# Patient Record
Sex: MALE | Race: WHITE | Employment: OTHER | URBAN - METROPOLITAN AREA
[De-identification: names, ages, dates, MRNs, and addresses within clinical notes are randomized per-mention and may not be internally consistent; named-entity substitution may affect disease eponyms.]

---

## 2020-02-24 PROBLEM — I48.91 AFIB (HCC): Status: ACTIVE | Noted: 2020-02-24

## 2020-03-16 ENCOUNTER — TELEPHONE (OUTPATIENT)
Dept: PHARMACY | Age: 80
End: 2020-03-16

## 2020-04-06 ENCOUNTER — TELEPHONE (OUTPATIENT)
Dept: PHARMACY | Age: 80
End: 2020-04-06

## 2020-04-06 NOTE — TELEPHONE ENCOUNTER
I called and left message for patient to call regarding scheduled appointment tomorrow in [facility].

## 2020-04-07 ENCOUNTER — ANTI-COAG VISIT (OUTPATIENT)
Dept: PHARMACY | Age: 80
End: 2020-04-07
Payer: MEDICARE

## 2020-04-07 DIAGNOSIS — I48.0 PAROXYSMAL ATRIAL FIBRILLATION (HCC): ICD-10-CM

## 2020-04-07 LAB
INR BLD: 2.69 (ref 0.86–1.14)
PROTHROMBIN TIME: 31.5 SEC (ref 10–13.2)

## 2020-04-07 PROCEDURE — 99211 OFF/OP EST MAY X REQ PHY/QHP: CPT

## 2020-04-07 RX ORDER — DOFETILIDE 0.5 MG/1
500 CAPSULE ORAL 2 TIMES DAILY
COMMUNITY

## 2020-04-07 RX ORDER — LEVOTHYROXINE SODIUM 0.05 MG/1
50 TABLET ORAL DAILY
COMMUNITY

## 2020-04-07 RX ORDER — WARFARIN SODIUM 4 MG/1
6 TABLET ORAL DAILY
COMMUNITY

## 2020-04-07 RX ORDER — METOPROLOL SUCCINATE 50 MG/1
100 TABLET, EXTENDED RELEASE ORAL NIGHTLY
COMMUNITY
End: 2022-04-20

## 2020-04-07 RX ORDER — ASPIRIN 81 MG/1
81 TABLET ORAL DAILY
COMMUNITY

## 2020-04-07 RX ORDER — FINASTERIDE 5 MG/1
5 TABLET, FILM COATED ORAL DAILY
COMMUNITY

## 2020-04-07 RX ORDER — TERAZOSIN 10 MG/1
10 CAPSULE ORAL NIGHTLY
COMMUNITY

## 2020-04-07 RX ORDER — LEVETIRACETAM 500 MG/1
500 TABLET ORAL 2 TIMES DAILY
COMMUNITY

## 2020-04-07 NOTE — PROGRESS NOTES
Mr. Mekhi Mcguire is a 78 y.o.  male with history of Afib who presents today for anticoagulation monitoring and adjustment. Patient verifies current dosing regimen  Patient denies s/s bleeding/bruising/swelling/SOB  No blood in urine or stool. No dietary changes. No changes in medication/OTC agents/Herbals. No change in alcohol use. No missed doses. No Procedures scheduled in the future at this time. Lab Results   Component Value Date    INR 2.69 (H) 04/07/2020       Pertinent findings: no changes    Warfarin dosing: Warfarin 6mg daily EXCEPT 4mg every Tuesday      Medications reviewed and updated on home medication list: Yes  Warfarin dose updated on patient home medication list: Yes     Mr. Sachi Deal is new to our center, however on warfarin for some time. Reviewed prior INRs. 2/13/20     1.9 Increased to 6mg daily EXCEPT 4mg every Tuesday 1/6/20  2.0 6mg daily EXCEPT 4mg Tues / Thurs 12/19/19 2.2 6mg daily EXCEPT 4mg Tues / Thurs 11/21/19 2.3 6mg daily EXCEPT 4mg Tues / Thurs    Patient is new to our clinic. Extensive education was given including but not limited to the following:     Dosing instructions   INR goal   Importance of INR monitoring   Avoid NSAIDs   Avoid alcohol, drinks 2 martinis every evening   Call with any medication changes including OTCs (starting, stopping, dose changes) and especially antibiotics   Vitamin K interaction and the importance of a consistent diet with regards to Vitamin K    Mr. Sachi Deal seems to have a very good understanding and is relatively stable at his dose. Pushed appointment out to 6 weeks from now due to coronavirus pandemic. Advised to call with any medication changes, especially antibiotics or steroid medications. Call with any diet changes, signs or symptoms of bleeding or any concerns or questions. If significant bleeding seek immediate medical attention.          CLINICAL PHARMACY CONSULT: MED RECONCILIATION/REVIEW ADDENDUM    For

## 2020-05-15 ENCOUNTER — TELEPHONE (OUTPATIENT)
Dept: PHARMACY | Age: 80
End: 2020-05-15

## 2020-05-19 ENCOUNTER — ANTI-COAG VISIT (OUTPATIENT)
Dept: PHARMACY | Age: 80
End: 2020-05-19
Payer: MEDICARE

## 2020-05-19 DIAGNOSIS — I48.0 PAROXYSMAL ATRIAL FIBRILLATION (HCC): ICD-10-CM

## 2020-05-19 LAB
INR BLD: 2.36 (ref 0.86–1.14)
PROTHROMBIN TIME: 27.6 SEC (ref 10–13.2)

## 2020-05-19 PROCEDURE — 99211 OFF/OP EST MAY X REQ PHY/QHP: CPT

## 2020-05-19 NOTE — PROGRESS NOTES
Mr. Dewitt Seen is a 78 y.o.  male with history of Afib. Mr. Dewitt Seen had an INR test today. Results were reviewed and appropriate warfarin management was completed. THIS VISIT WAS COMPLETED AS:  [x]   A VIRTUAL VISIT VIA TELEPHONE IN EFFORTS TO REDUCE THE SPREAD OF COVID-19.  []   A DRIVE-THRU VISIT IN EFFORTS TO REDUCE THE SPREAD OF COVID-19.  []   AN IN PERSON VISIT. PROTOCOLS WERE FOLLOWED WITH PRECAUTIONS TO REDUCE THE SPREAD OF COVID-19. Patient verifies current dosing regimen  Patient denies s/s bleeding/bruising/swelling/SOB  No blood in urine or stool. No dietary changes. No changes in medication/OTC agents/Herbals. No change in alcohol use. No missed doses. No Procedures scheduled in the future at this time. Lab Results   Component Value Date    INR 2.36 (H) 05/19/2020    INR 2.69 (H) 04/07/2020       Pertinent findings:   Patient states doing well. No complaints regarding warfarin therapy. No change to weekly warfarin dosing.      Warfarin dosing:   CONTINUE: Warfarin 6mg daily EXCEPT 4mg every Tuesday    Next INR: 6 weeks      Medications reviewed and updated on home medication list: No:   Warfarin dose updated on patient home medication list: No:        CLINICAL PHARMACY CONSULT: MED RECONCILIATION/REVIEW ADDENDUM    For Pharmacy Admin Tracking Only    PHSO: Yes  Total # of Interventions Recommended: 0      Total Interventions Accepted: 0  Time Spent (min): [address], RP, PRS 5/19/2020  12:32 PM

## 2020-06-30 ENCOUNTER — ANTI-COAG VISIT (OUTPATIENT)
Dept: PHARMACY | Age: 80
End: 2020-06-30
Payer: MEDICARE

## 2020-06-30 PROCEDURE — 99211 OFF/OP EST MAY X REQ PHY/QHP: CPT

## 2020-08-11 ENCOUNTER — ANTI-COAG VISIT (OUTPATIENT)
Dept: PHARMACY | Age: 80
End: 2020-08-11
Payer: MEDICARE

## 2020-08-11 VITALS — TEMPERATURE: 97.5 F

## 2020-08-11 LAB — INTERNATIONAL NORMALIZATION RATIO, POC: 2.1

## 2020-08-11 PROCEDURE — 85610 PROTHROMBIN TIME: CPT

## 2020-08-11 PROCEDURE — 99211 OFF/OP EST MAY X REQ PHY/QHP: CPT

## 2020-08-11 NOTE — PROGRESS NOTES
Mr. Darnell Walton is a 78 y.o.  male. Mr. Darnell Walton had an INR test today. Results were reviewed and appropriate warfarin management was completed. THIS VISIT WAS COMPLETED AS:  []    A VIRTUAL VISIT VIA TELEPHONE IN EFFORTS TO REDUCE THE SPREAD OF COVID-19.  []    A DRIVE-THRU VISIT IN EFFORTS TO REDUCE THE SPREAD OF COVID-19. [x]    AN IN PERSON VISIT. PROTOCOLS WERE FOLLOWED WITH PRECAUTIONS TO REDUCE THE SPREAD OF COVID-19. Patient verifies current dosing regimen: Yes     Warfarin medication reviewed and updated on the patient 's home medication list: No:    All other medications reviewed and updated on the patient 's home medication list: No:      Lab Results   Component Value Date    INR 2.1 2020    INR 2.18 (H) 2020    INR 2.36 (H) 2020       Patient Findings     Negatives:   Signs/symptoms of thrombosis, Signs/symptoms of bleeding, Laboratory test error suspected, Change in health, Change in alcohol use, Change in activity, Upcoming invasive procedure, Emergency department visit, Upcoming dental procedure, Missed doses, Extra doses, Change in medications, Change in diet/appetite, Hospital admission, Bruising, Other complaints          Anticoagulation Summary  As of 2020    INR goal:   2.0-3.0   TTR:   100.0 % (4.2 mo)   INR used for dosin.1 (2020)   Warfarin maintenance plan:   4 mg (4 mg x 1) every Tue; 6 mg (4 mg x 1.5) all other days   Weekly warfarin total:   40 mg   Plan last modified:   Sharla Casey RPH (2020)   Next INR check:   2020   Priority:   Maintenance   Target end date:    Indefinite    Indications    Afib ([address] [I48.91]             Anticoagulation Episode Summary     INR check location:       Preferred lab:       Send INR reminders to:   [facility] STAFF    Comments:   [facility]      Anticoagulation Care Providers     Provider Role Specialty Phone number    Pedro Christiansen MD Referring Cardiology [phone] Warfarin plan:   Patient states doing well. No complaints regarding warfarin therapy. No change to weekly warfarin dosing. Description    CONTINUE: Warfarin 6mg daily EXCEPT 4mg every Tuesday    Call with medications changes, especially antibiotics and steroids and including any over-the-counter medications or herbal products. Call if you stop any medications. Keep the number of servings of Vitamin K (dark green vegetables) consistent from week to week  He eats a large salad with several vegetables every evening. Broccoli once per week. Spinach once per month. 2 Vodka martinis every evening         Reviewed AVS with patient / caregiver.       CLINICAL PHARMACY CONSULT: MED RECONCILIATION/REVIEW ADDENDUM    For Pharmacy Admin Tracking Only    PHSO: No  Total # of Interventions Recommended: 0    - Maintenance Safety Lab Monitoring #: 1  Total Interventions Accepted: 0  Time Spent (min): [address] [facility], [place]S 8/11/2020  8:11 AM

## 2020-09-25 ENCOUNTER — ANTI-COAG VISIT (OUTPATIENT)
Dept: PHARMACY | Age: 80
End: 2020-09-25
Payer: MEDICARE

## 2020-09-25 VITALS — TEMPERATURE: 96.6 F

## 2020-09-25 LAB — INTERNATIONAL NORMALIZATION RATIO, POC: 2.4

## 2020-09-25 PROCEDURE — 99211 OFF/OP EST MAY X REQ PHY/QHP: CPT

## 2020-09-25 PROCEDURE — 85610 PROTHROMBIN TIME: CPT

## 2020-09-25 NOTE — PROGRESS NOTES
Mr. Antonietta Guallpa is a 78 y.o.  male. Mr. Antonietta Guallpa had an INR test today. Results were reviewed and appropriate warfarin management was completed. THIS VISIT WAS COMPLETED AS:   []    A VIRTUAL VISIT VIA TELEPHONE IN EFFORTS TO REDUCE THE SPREAD OF COVID-19.  []    A DRIVE-THRU VISIT IN EFFORTS TO REDUCE THE SPREAD OF COVID-19. [x]    AN IN PERSON VISIT. PROTOCOLS WERE FOLLOWED WITH PRECAUTIONS TO REDUCE THE SPREAD OF COVID-19. Patient verifies current dosing regimen: Yes     Warfarin medication reviewed and updated on the patient 's home medication list: Yes   All other medications reviewed and updated on the patient 's home medication list: No:      Lab Results   Component Value Date    INR 2.4 2020    INR 2.1 2020    INR 2.18 (H) 2020       Patient Findings     Negatives:   Signs/symptoms of thrombosis, Signs/symptoms of bleeding, Laboratory test error suspected, Change in health, Change in alcohol use, Change in activity, Upcoming invasive procedure, Emergency department visit, Upcoming dental procedure, Missed doses, Extra doses, Change in medications, Change in diet/appetite, Hospital admission, Bruising, Other complaints          Anticoagulation Summary  As of 2020    INR goal:   2.0-3.0   TTR:   100.0 % (5.7 mo)   INR used for dosin.4 (2020)   Warfarin maintenance plan:   4 mg (4 mg x 1) every Tue; 6 mg (4 mg x 1.5) all other days   Weekly warfarin total:   40 mg   Plan last modified:   Sharla Casey RPH (2020)   Next INR check:   2020   Priority:   Maintenance   Target end date: Indefinite    Indications    Afib ([facility] [address] [I48.91]             Anticoagulation Episode Summary     INR check location:   Anticoagulation Clinic    Preferred lab:       Send INR reminders to:   [facility] STAFF    Comments:   FAX. Tonye Cogan Tonye Cogan Q 6 months      Anticoagulation Care Providers     Provider Role Specialty Phone number    Chemo Medellin MD Referring Cardiology

## 2020-11-06 ENCOUNTER — ANTI-COAG VISIT (OUTPATIENT)
Dept: PHARMACY | Age: 80
End: 2020-11-06
Payer: MEDICARE

## 2020-11-06 VITALS — TEMPERATURE: 97.1 F

## 2020-11-06 LAB — INTERNATIONAL NORMALIZATION RATIO, POC: 1.8

## 2020-11-06 PROCEDURE — 85610 PROTHROMBIN TIME: CPT

## 2020-11-06 PROCEDURE — 99211 OFF/OP EST MAY X REQ PHY/QHP: CPT

## 2020-11-06 NOTE — PROGRESS NOTES
Mr. Richmond Back is a 78 y.o.  male. Mr. Richmond Back had an INR test today. Results were reviewed and appropriate warfarin management was completed. THIS VISIT WAS COMPLETED AS:  []    A VIRTUAL VISIT VIA TELEPHONE IN EFFORTS TO REDUCE THE SPREAD OF COVID-19.  []    A DRIVE-THRU VISIT IN EFFORTS TO REDUCE THE SPREAD OF COVID-19. [x]    AN IN PERSON VISIT. PROTOCOLS WERE FOLLOWED WITH PRECAUTIONS TO REDUCE THE SPREAD OF COVID-19. Patient verifies current dosing regimen: Yes     Warfarin medication reviewed and updated on the patient 's home medication list: Yes   All other medications reviewed and updated on the patient 's home medication list: No:      Lab Results   Component Value Date    INR 1.8 2020    INR 2.4 2020    INR 2.1 2020       Patient Findings     Positives:   Change in diet/appetite    Negatives:   Signs/symptoms of thrombosis, Signs/symptoms of bleeding, Laboratory test error suspected, Change in health, Change in alcohol use, Change in activity, Upcoming invasive procedure, Emergency department visit, Upcoming dental procedure, Missed doses, Extra doses, Change in medications, Hospital admission, Bruising, Other complaints    Comments:   Increase in Faustino salads          Anticoagulation Summary  As of 2020    INR goal:   2.0-3.0   TTR:   93.4 % (7.1 mo)   INR used for dosin.8! (2020)   Warfarin maintenance plan:   4 mg (4 mg x 1) every Tue; 6 mg (4 mg x 1.5) all other days   Weekly warfarin total:   40 mg   Plan last modified:   Sharla Casey RPH (2020)   Next INR check:   2020   Priority:   Maintenance   Target end date: Indefinite    Indications    Afib ([facility] [address] [I48.91]             Anticoagulation Episode Summary     INR check location:   Anticoagulation Clinic    Preferred lab:       Send INR reminders to:   [facility]ICAL STAFF    Comments:   FAX. Lily Sinclair Q 6 weeks      Anticoagulation Care Providers     Provider Role Specialty Phone number    Radha Martinez MD Referring Cardiology [phone]          Warfarin plan:   Patient states doing well. No complaints regarding warfarin therapy. No change to weekly warfarin dosing. Patient has had am increase in Faustino lettuce in his salads causing his INR to fall below 2.0. Description    Take 8 mg of warfarin on 11-6-20  CONTINUE: Warfarin 6mg daily EXCEPT 4mg every Tuesday    Call with medications changes, especially antibiotics and steroids and including any over-the-counter medications or herbal products. Call if you stop any medications. Keep the number of servings of Vitamin K (dark green vegetables) consistent from week to week  He eats a large salad with several vegetables every evening. Broccoli once per week. Spinach once per month. 2 Vodka martinis every evening         Reviewed AVS with patient / caregiver.       CLINICAL PHARMACY CONSULT: MED RECONCILIATION/REVIEW ADDENDUM    For Pharmacy Admin Tracking Only    PHSO: Yes  Total # of Interventions Recommended: 1  - Increased Dose #: 1  - Maintenance Safety Lab Monitoring #: 1  Total Interventions Accepted: 1  Time Spent (min): 15

## 2020-12-18 ENCOUNTER — ANTI-COAG VISIT (OUTPATIENT)
Dept: PHARMACY | Age: 80
End: 2020-12-18
Payer: MEDICARE

## 2020-12-18 VITALS — TEMPERATURE: 96.3 F

## 2020-12-18 LAB — INTERNATIONAL NORMALIZATION RATIO, POC: 2

## 2020-12-18 PROCEDURE — 99211 OFF/OP EST MAY X REQ PHY/QHP: CPT

## 2020-12-18 PROCEDURE — 85610 PROTHROMBIN TIME: CPT

## 2020-12-18 NOTE — PROGRESS NOTES
Mr. Cayla Whiteside is a 78 y.o.  male. Mr. Cayla Whiteside had an INR test today. Results were reviewed and appropriate warfarin management was completed. THIS VISIT WAS COMPLETED AS:   []    A VIRTUAL VISIT VIA TELEPHONE IN EFFORTS TO REDUCE THE SPREAD OF COVID-19.  []    A DRIVE-THRU VISIT IN EFFORTS TO REDUCE THE SPREAD OF COVID-19. [x]    AN IN PERSON VISIT. PROTOCOLS WERE FOLLOWED WITH PRECAUTIONS TO REDUCE THE SPREAD OF COVID-19. Patient verifies current dosing regimen: Yes     Warfarin medication reviewed and updated on the patient 's home medication list: Yes   All other medications reviewed and updated on the patient 's home medication list: No: no changes     Lab Results   Component Value Date    INR 2.0 2020    INR 1.8 2020    INR 2.4 2020       Patient Findings     Negatives:  Signs/symptoms of bleeding, Change in medications, Change in diet/appetite, Bruising          Anticoagulation Summary  As of 2020    INR goal:  2.0-3.0   TTR:  78.0 % (8.5 mo)   INR used for dosin.0 (2020)   Warfarin maintenance plan:  6 mg (4 mg x 1.5) every day   Weekly warfarin total:  42 mg   Plan last modified:  Sharla Casey RP (2020)   Next INR check:  1/15/2021   Priority:  Maintenance   Target end date: Indefinite    Indications    Afib ([address] [I48.91]             Anticoagulation Episode Summary     INR check location:  Anticoagulation Clinic    Preferred lab:      Send INR reminders to:  Melia Vazquez MANAGEMENT CLINICAL STAFF    Comments:  FAX Dr. Alnodra Johnson. Syeda Rodriguez Q 6 weeks      Anticoagulation Care Providers     Provider Role Specialty Phone number    Noé Carrera MD Referring Cardiology [phone]          Warfarin plan:   His INR has been running on the low end of range or just below. I will increase his weekly dose of warfarin by just 5% with hopes to get his INR close to 2.5.      Description    NEW DOSE: Warfarin 6mg daily Call with medications changes, especially antibiotics and steroids and including any over-the-counter medications or herbal products. Call if you stop any medications. Keep the number of servings of Vitamin K (dark green vegetables) consistent from week to week  He eats a large salad with several vegetables every evening. Broccoli once per week. Spinach once per month. 2 Vodka martinis every evening         Reviewed AVS with patient / caregiver.       CLINICAL PHARMACY CONSULT: MED RECONCILIATION/REVIEW ADDENDUM    For Pharmacy Admin Tracking Only    PHSO (orange banner): No  Total # of Interventions Recommended (warfarin changes): 1  (warfarin changes) - Increased Dose #: 1  (#1 for reviewing INR) - Maintenance Safety Lab Monitoring #: 1  Total Interventions Accepted (warfarin related): 1  Time Spent (min) (round up): 15

## 2021-02-05 ENCOUNTER — ANTI-COAG VISIT (OUTPATIENT)
Dept: PHARMACY | Age: 81
End: 2021-02-05
Payer: MEDICARE

## 2021-02-05 VITALS — TEMPERATURE: 97 F

## 2021-02-05 DIAGNOSIS — I48.91 ATRIAL FIBRILLATION, UNSPECIFIED TYPE (HCC): ICD-10-CM

## 2021-02-05 LAB — INTERNATIONAL NORMALIZATION RATIO, POC: 1.9

## 2021-02-05 PROCEDURE — 85610 PROTHROMBIN TIME: CPT

## 2021-02-05 PROCEDURE — 99211 OFF/OP EST MAY X REQ PHY/QHP: CPT

## 2021-02-05 NOTE — PROGRESS NOTES
Mr. Christianne Baumgarten is a 78 y.o.  male. Mr. Christianne Baumgarten had an INR test today. Results were reviewed and appropriate warfarin management was completed. THIS VISIT WAS COMPLETED AS:  []    A VIRTUAL VISIT VIA TELEPHONE IN EFFORTS TO REDUCE THE SPREAD OF COVID-19.  []    A DRIVE-THRU VISIT IN EFFORTS TO REDUCE THE SPREAD OF COVID-19. [x]    AN IN PERSON VISIT. PROTOCOLS WERE FOLLOWED WITH PRECAUTIONS TO REDUCE THE SPREAD OF COVID-19. Patient verifies current dosing regimen: Yes     Warfarin medication reviewed and updated on the patient 's home medication list: Yes   All other medications reviewed and updated on the patient 's home medication list: No:      Lab Results   Component Value Date    INR 1.9 2021    INR 2.0 2020    INR 1.8 2020       Patient Findings     Negatives:  Signs/symptoms of thrombosis, Signs/symptoms of bleeding, Laboratory test error suspected, Change in health, Change in alcohol use, Change in activity, Upcoming invasive procedure, Emergency department visit, Upcoming dental procedure, Missed doses, Extra doses, Change in medications, Change in diet/appetite, Hospital admission, Bruising, Other complaints          Anticoagulation Summary  As of 2021    INR goal:  2.0-3.0   TTR:  65.5 % (10.1 mo)   INR used for dosin.9 (2021)   Warfarin maintenance plan:  6 mg (4 mg x 1.5) every day   Weekly warfarin total:  42 mg   Plan last modified:  Sharla Casey RPH (2020)   Next INR check:  3/19/2021   Priority:  Maintenance   Target end date: Indefinite    Indications    Afib ([address] [I48.91]             Anticoagulation Episode Summary     INR check location:  Anticoagulation Clinic    Preferred lab:      Send INR reminders to:  Cookie Cline MANAGEMENT CLINICAL STAFF    Comments:  FAX Dr. Grace Obando. Daisy Gómez Q 6 weeks      Anticoagulation Care Providers     Provider Role Specialty Phone number    Bethany Cortez MD Referring Cardiology [phone] Warfarin assessment / plan:   Patient appears well. No complaints regarding warfarin therapy. No change to weekly warfarin dosing. Patient had a large portion of ad mosquedaw yesterday. Gave him an 8 mg dose for today. Description    Take warfarin 8 mg on 2-5-21  CONTINUE: Warfarin 6mg daily    Call with medications changes, especially antibiotics and steroids and including any over-the-counter medications or herbal products. Call if you stop any medications. Keep the number of servings of Vitamin K (dark green vegetables) consistent from week to week  He eats a large salad with several vegetables every evening. Broccoli once per week. Spinach once per month. 2 Vodka martinis every evening       Reviewed AVS with patient / caregiver.       CLINICAL PHARMACY CONSULT: MED RECONCILIATION/REVIEW ADDENDUM    For Pharmacy Admin Tracking Only    PHSO (orange banner): No  Total # of Interventions Recommended (warfarin changes): 1  (warfarin changes)   (other medication updates)- Updated Order #: 0 Updated Order Reason(s):   (#1 for reviewing INR) - Maintenance Safety Lab Monitoring #: 1  Total Interventions Accepted (warfarin related): 1  Time Spent (min) (round up): 15

## 2021-03-19 ENCOUNTER — ANTI-COAG VISIT (OUTPATIENT)
Dept: PHARMACY | Age: 81
End: 2021-03-19
Payer: MEDICARE

## 2021-03-19 DIAGNOSIS — I48.91 ATRIAL FIBRILLATION, UNSPECIFIED TYPE (HCC): ICD-10-CM

## 2021-03-19 LAB — INTERNATIONAL NORMALIZATION RATIO, POC: 1.7

## 2021-03-19 PROCEDURE — 99211 OFF/OP EST MAY X REQ PHY/QHP: CPT

## 2021-03-19 PROCEDURE — 85610 PROTHROMBIN TIME: CPT

## 2021-03-19 NOTE — PROGRESS NOTES
Mr. Tracey Frye is a 78 y.o.  male. Mr. Tracey Frye had an INR test today. Results were reviewed and appropriate warfarin management was completed. THIS VISIT WAS COMPLETED AS:   []    A VIRTUAL VISIT VIA TELEPHONE IN EFFORTS TO REDUCE THE SPREAD OF COVID-19.  []    A DRIVE-THRU VISIT IN EFFORTS TO REDUCE THE SPREAD OF COVID-19. [x]    AN IN PERSON VISIT. PROTOCOLS WERE FOLLOWED WITH PRECAUTIONS TO REDUCE THE SPREAD OF COVID-19. Patient verifies current dosing regimen: Yes     Warfarin medication reviewed and updated on the patient 's home medication list: Yes   All other medications reviewed and updated on the patient 's home medication list: No new medications     Lab Results   Component Value Date    INR 1.7 2021    INR 1.9 2021    INR 2.0 2020       Patient Findings     Positives:  Missed doses    Negatives:  Signs/symptoms of thrombosis, Signs/symptoms of bleeding, Change in health, Change in medications, Change in diet/appetite, Bruising    Comments:  Missed doses due to hernia operation. Anticoagulation Summary  As of 3/19/2021    INR goal:  2.0-3.0   TTR:  57.5 % (11.5 mo)   INR used for dosin.7 (3/19/2021)   Warfarin maintenance plan:  8 mg (4 mg x 2) every Mon; 6 mg (4 mg x 1.5) all other days   Weekly warfarin total:  44 mg   Plan last modified:  Sonal Saleh RPH (3/19/2021)   Next INR check:  2021   Priority:  Maintenance   Target end date: Indefinite    Indications    Afib ([facility] [address] [I48.91]             Anticoagulation Episode Summary     INR check location:  Anticoagulation Clinic    Preferred lab:      Send INR reminders to:  Ángel Mcfadden MANAGEMENT CLINICAL STAFF    Comments:  FAX Dr. Andrea Dutta. Torey Trinh Q 6 weeks      Anticoagulation Care Providers     Provider Role Specialty Phone number    Alejandro Stone MD Referring Cardiology [phone]          Warfarin assessment / plan:   Patient states doing well.   Had a hernia surgery about two weeks ago and held his warfarin for about 5 days. His INR today is low due to the warfarin hold and his dosing needs to be readjusted. Made an ~ 5% (2 mg/week) increase to his weekly warfarin dosing. Description    Take warfarin 8 mg on 3-19-21  NEW DOSE: Warfarin 6mg daily except 8 mg on Mondays    Call with medications changes, especially antibiotics and steroids and including any over-the-counter medications or herbal products. Call if you stop any medications. Keep the number of servings of Vitamin K (dark green vegetables) consistent from week to week  He eats a large salad with several vegetables every evening. Broccoli once per week. Spinach once per month. 2 Vodka martinis every evening           There is no immunization history on file for this patient. Reviewed AVS with patient / caregiver. CLINICAL PHARMACY CONSULT: MED RECONCILIATION/REVIEW ADDENDUM    For Pharmacy Admin Tracking Only    PHSO (orange banner): No  Total # of Interventions Recommended (warfarin changes): 1  (warfarin changes) - Increased Dose #: 1  (other medication updates)- Updated Order #: 1 Updated Order Reason(s):  Other  (#1 for reviewing INR) - Maintenance Safety Lab Monitoring #: 1  Total Interventions Accepted (warfarin related): 1  Time Spent (min) (round up): 15

## 2021-04-09 ENCOUNTER — ANTI-COAG VISIT (OUTPATIENT)
Dept: PHARMACY | Age: 81
End: 2021-04-09
Payer: MEDICARE

## 2021-04-09 DIAGNOSIS — I48.91 ATRIAL FIBRILLATION, UNSPECIFIED TYPE (HCC): ICD-10-CM

## 2021-04-09 LAB — INTERNATIONAL NORMALIZATION RATIO, POC: 2.2

## 2021-04-09 PROCEDURE — 99211 OFF/OP EST MAY X REQ PHY/QHP: CPT

## 2021-04-09 PROCEDURE — 85610 PROTHROMBIN TIME: CPT

## 2021-04-09 RX ORDER — LISINOPRIL 5 MG/1
5 TABLET ORAL DAILY
COMMUNITY

## 2021-04-09 NOTE — PROGRESS NOTES
Mr. Josias Almaraz is a 78 y.o.  male. Mr. Josias Almaraz had an INR test today. Results were reviewed and appropriate warfarin management was completed. THIS VISIT WAS COMPLETED AS:   []    A VIRTUAL VISIT VIA TELEPHONE IN EFFORTS TO REDUCE THE SPREAD OF COVID-19.  []    A DRIVE-THRU VISIT IN EFFORTS TO REDUCE THE SPREAD OF COVID-19. [x]    AN IN PERSON VISIT. PROTOCOLS WERE FOLLOWED WITH PRECAUTIONS TO REDUCE THE SPREAD OF COVID-19. Patient verifies current dosing regimen: Yes     Warfarin medication reviewed and updated on the patient 's home medication list: Yes   All other medications reviewed and updated on the patient 's home medication list: Yes     Lab Results   Component Value Date    INR 2.2 2021    INR 1.7 2021    INR 1.9 2021       Patient Findings     Positives:  Change in medications    Negatives:  Signs/symptoms of thrombosis, Signs/symptoms of bleeding, Change in health, Missed doses, Change in diet/appetite, Bruising    Comments:  Beginning lisinopril 5 mg daily          Anticoagulation Summary  As of 2021    INR goal:  2.0-3.0   TTR:  56.5 % (1 y)   INR used for dosin.2 (2021)   Warfarin maintenance plan:  8 mg (4 mg x 2) every Mon; 6 mg (4 mg x 1.5) all other days   Weekly warfarin total:  44 mg   Plan last modified:  Han Nieto KALPANA (3/19/2021)   Next INR check:  2021   Priority:  Maintenance   Target end date: Indefinite    Indications    Afib ([facility] [address] [I48.91]             Anticoagulation Episode Summary     INR check location:  Anticoagulation Clinic    Preferred lab:      Send INR reminders to:  Francia Fuentes MANAGEMENT CLINICAL STAFF    Comments:  FAX Dr. Joyd Garrett. Jacqui Montano Q 6 weeks      Anticoagulation Care Providers     Provider Role Specialty Phone number    Nilay Hicks MD Referring Cardiology [phone]          Warfarin assessment / plan:   Patient appears well. No complaints regarding warfarin therapy. No change to weekly warfarin dosing.

## 2021-05-17 NOTE — PROGRESS NOTES
Preoperative Screening for Elective Surgery/Invasive Procedures While COVID-19 present in the community     Have you tested positive or have been told to self-isolate for COVID-19 like symptoms within the past 28 days? no   Do you currently have any of the following symptoms? no  o Fever >100.0 F or 99.9 F in immunocompromised patients? no  o New onset cough, shortness of breath or difficulty breathing? no  o New onset sore throat, myalgia (muscle aches and pains), headache, loss of taste/smell or diarrhea? no   Have you had a potential exposure to COVID-19 within the past 14 days by:  o Close contact with a confirmed case? no  o Close contact with a healthcare worker,  or essential infrastructure worker (grocery store, [employer], gas station, public utilities or transportation)? 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 no  o Have you had recent travel to a known COVID-19 hotspot? no    Indicate if the patient has a positive screen by answering yes to one or more of the above questions. Patients who test positive or screen positive prior to surgery or on the day of surgery should be evaluated in conjunction with the surgeon/proceduralist/anesthesiologist to determine the urgency of the procedure.       Vaccines X 2 over 2 month ago

## 2021-05-17 NOTE — PROGRESS NOTES
[address] [facility] time___0640_________        Surgery time___0810_________    Take the following medications with a sip of water: per md instruction     Do not eat or drink anything after 12:00 midnight prior to your surgery. This includes water chewing gum, mints and ice chips. You may brush your teeth and gargle the morning of your surgery, but do not swallow the water     Please see your family doctor/pediatrician for a history and physical and/or concerning medications. H&P 5/13/21 Obnny Copper    Bring any test results/reports from your physicians office. If you are under the care of a heart doctor or specialist doctor, please be aware that you may be asked to them for clearance    You may be asked to stop blood thinners such as Coumadin, Plavix, Fragmin, Lovenox, etc., or any anti-inflammatories such as:  Aspirin, Ibuprofen, Advil, Naproxen prior to your surgery. We also ask that you stop any OTC medications such as fish oil, vitamin E, glucosamine, garlic, Multivitamins, COQ 10, etc.    We ask that you do not smoke 24 hours prior to surgery  We ask that you do not  drink any alcoholic beverages 24 hours prior to surgery     You must make arrangements for a responsible adult to take you home after your surgery. For your safety you will not be allowed to leave alone or drive yourself home. Your surgery will be cancelled if you do not have a ride home. Also for your safety, it is strongly suggested that someone stay with you the first 24 hours after your surgery. A parent or legal guardian must accompany a child scheduled for surgery and plan to stay at the hospital until the child is discharged. Please do not bring other children with you. For your comfort, please wear simple loose fitting clothing to the hospital.  Please do not bring valuables. Do not wear any make-up or nail polish on your fingers or toes.  Wear short sleeve button down surgery.

## 2021-05-18 ENCOUNTER — PREP FOR PROCEDURE (OUTPATIENT)
Dept: OPHTHALMOLOGY | Age: 81
End: 2021-05-18

## 2021-05-18 RX ORDER — SODIUM CHLORIDE 0.9 % (FLUSH) 0.9 %
10 SYRINGE (ML) INJECTION PRN
Status: CANCELLED | OUTPATIENT
Start: 2021-05-18

## 2021-05-18 RX ORDER — PHENYLEPHRINE HCL 2.5 %
1 DROPS OPHTHALMIC (EYE) SEE ADMIN INSTRUCTIONS
Status: CANCELLED | OUTPATIENT
Start: 2021-05-18

## 2021-05-18 RX ORDER — SODIUM CHLORIDE 0.9 % (FLUSH) 0.9 %
10 SYRINGE (ML) INJECTION EVERY 12 HOURS SCHEDULED
Status: CANCELLED | OUTPATIENT
Start: 2021-05-18

## 2021-05-18 RX ORDER — SODIUM CHLORIDE 9 MG/ML
25 INJECTION, SOLUTION INTRAVENOUS PRN
Status: CANCELLED | OUTPATIENT
Start: 2021-05-18

## 2021-05-18 RX ORDER — TETRACAINE HYDROCHLORIDE 5 MG/ML
1 SOLUTION OPHTHALMIC SEE ADMIN INSTRUCTIONS
Status: CANCELLED | OUTPATIENT
Start: 2021-05-18

## 2021-05-18 RX ORDER — CIPROFLOXACIN HYDROCHLORIDE 3.5 MG/ML
1 SOLUTION/ DROPS TOPICAL SEE ADMIN INSTRUCTIONS
Status: CANCELLED | OUTPATIENT
Start: 2021-05-18

## 2021-05-18 RX ORDER — TROPICAMIDE 10 MG/ML
1 SOLUTION/ DROPS OPHTHALMIC SEE ADMIN INSTRUCTIONS
Status: CANCELLED | OUTPATIENT
Start: 2021-05-18

## 2021-05-18 RX ORDER — KETOROLAC TROMETHAMINE 5 MG/ML
1 SOLUTION OPHTHALMIC SEE ADMIN INSTRUCTIONS
Status: CANCELLED | OUTPATIENT
Start: 2021-05-18

## 2021-05-18 NOTE — PROGRESS NOTES
[address] [facility]  [phone]        Pre-Op Phone Call:     Patient Name: Elizabet Guerra     Telephone Information:   Mobile [phone]     Home phone:  [phone]    Surgery Time:    8:10 AM     Arrival Time:  6301     Left extended Message:  Yes     Message left with: vm     Recent change in health status:  call md     Advised of transportation/ policy:  Yes     NPO policy reviewed: Yes vm     Advised to take morning heart/blood pressure medications with sips of water morning of surgery? Yes     Instructed to bring eye drops, photo identification, and insurance card day of surgery? Yes     Advised to wear short sleeved button down shirt (no T-shirt underneath):  Yes     Advised not to wear jewelry, hairpins, or pantyhose day of surgery? Yes     Advised not to wear make-up and to wash face day of surgery?   Yes    Remarks:        Electronically signed by:  Shanti Sylvester RN at 5/18/2021 2:21 PM

## 2021-05-19 ENCOUNTER — ANESTHESIA EVENT (OUTPATIENT)
Dept: SURGERY | Age: 81
End: 2021-05-19
Payer: MEDICARE

## 2021-05-20 ENCOUNTER — ANESTHESIA (OUTPATIENT)
Dept: SURGERY | Age: 81
End: 2021-05-20
Payer: MEDICARE

## 2021-05-20 ENCOUNTER — HOSPITAL ENCOUNTER (OUTPATIENT)
Age: 81
Setting detail: OUTPATIENT SURGERY
Discharge: HOME | End: 2021-05-20
Attending: OPHTHALMOLOGY | Admitting: OPHTHALMOLOGY
Payer: MEDICARE

## 2021-05-20 VITALS
RESPIRATION RATE: 15 BRPM | DIASTOLIC BLOOD PRESSURE: 76 MMHG | SYSTOLIC BLOOD PRESSURE: 125 MMHG | WEIGHT: 235 LBS | TEMPERATURE: 96.6 F | HEART RATE: 58 BPM | OXYGEN SATURATION: 96 % | BODY MASS INDEX: 30.16 KG/M2 | HEIGHT: 74 IN

## 2021-05-20 VITALS — DIASTOLIC BLOOD PRESSURE: 69 MMHG | OXYGEN SATURATION: 100 % | SYSTOLIC BLOOD PRESSURE: 120 MMHG

## 2021-05-20 PROCEDURE — 3600000004 HC SURGERY LEVEL 4 BASE: Performed by: OPHTHALMOLOGY

## 2021-05-20 PROCEDURE — 3700000000 HC ANESTHESIA ATTENDED CARE: Performed by: OPHTHALMOLOGY

## 2021-05-20 PROCEDURE — 2709999900 HC NON-CHARGEABLE SUPPLY: Performed by: OPHTHALMOLOGY

## 2021-05-20 PROCEDURE — 6360000002 HC RX W HCPCS: Performed by: NURSE ANESTHETIST, CERTIFIED REGISTERED

## 2021-05-20 PROCEDURE — 6370000000 HC RX 637 (ALT 250 FOR IP): Performed by: OPHTHALMOLOGY

## 2021-05-20 PROCEDURE — V2632 POST CHMBR INTRAOCULAR LENS: HCPCS | Performed by: OPHTHALMOLOGY

## 2021-05-20 PROCEDURE — 2500000003 HC RX 250 WO HCPCS: Performed by: OPHTHALMOLOGY

## 2021-05-20 PROCEDURE — 3600000014 HC SURGERY LEVEL 4 ADDTL 15MIN: Performed by: OPHTHALMOLOGY

## 2021-05-20 PROCEDURE — 7100000010 HC PHASE II RECOVERY - FIRST 15 MIN: Performed by: OPHTHALMOLOGY

## 2021-05-20 PROCEDURE — 3700000001 HC ADD 15 MINUTES (ANESTHESIA): Performed by: OPHTHALMOLOGY

## 2021-05-20 PROCEDURE — 2580000003 HC RX 258: Performed by: ANESTHESIOLOGY

## 2021-05-20 DEVICE — LENS IOL SN60WF 19.0D: Type: IMPLANTABLE DEVICE | Site: EYE | Status: FUNCTIONAL

## 2021-05-20 RX ORDER — PHENYLEPHRINE HCL 2.5 %
1 DROPS OPHTHALMIC (EYE) SEE ADMIN INSTRUCTIONS
Status: COMPLETED | OUTPATIENT
Start: 2021-05-20 | End: 2021-05-20

## 2021-05-20 RX ORDER — FENTANYL CITRATE 50 UG/ML
INJECTION, SOLUTION INTRAMUSCULAR; INTRAVENOUS PRN
Status: DISCONTINUED | OUTPATIENT
Start: 2021-05-20 | End: 2021-05-20 | Stop reason: SDUPTHER

## 2021-05-20 RX ORDER — MIDAZOLAM HYDROCHLORIDE 1 MG/ML
INJECTION INTRAMUSCULAR; INTRAVENOUS PRN
Status: DISCONTINUED | OUTPATIENT
Start: 2021-05-20 | End: 2021-05-20 | Stop reason: SDUPTHER

## 2021-05-20 RX ORDER — SODIUM CHLORIDE 0.9 % (FLUSH) 0.9 %
10 SYRINGE (ML) INJECTION PRN
Status: DISCONTINUED | OUTPATIENT
Start: 2021-05-20 | End: 2021-05-20 | Stop reason: SDUPTHER

## 2021-05-20 RX ORDER — TROPICAMIDE 10 MG/ML
1 SOLUTION/ DROPS OPHTHALMIC SEE ADMIN INSTRUCTIONS
Status: COMPLETED | OUTPATIENT
Start: 2021-05-20 | End: 2021-05-20

## 2021-05-20 RX ORDER — SODIUM CHLORIDE 9 MG/ML
25 INJECTION, SOLUTION INTRAVENOUS PRN
Status: DISCONTINUED | OUTPATIENT
Start: 2021-05-20 | End: 2021-05-20 | Stop reason: HOSPADM

## 2021-05-20 RX ORDER — BRIMONIDINE TARTRATE 2 MG/ML
SOLUTION/ DROPS OPHTHALMIC
Status: COMPLETED | OUTPATIENT
Start: 2021-05-20 | End: 2021-05-20

## 2021-05-20 RX ORDER — CIPROFLOXACIN HYDROCHLORIDE 3.5 MG/ML
1 SOLUTION/ DROPS TOPICAL SEE ADMIN INSTRUCTIONS
Status: COMPLETED | OUTPATIENT
Start: 2021-05-20 | End: 2021-05-20

## 2021-05-20 RX ORDER — TETRACAINE HYDROCHLORIDE 5 MG/ML
1 SOLUTION OPHTHALMIC SEE ADMIN INSTRUCTIONS
Status: COMPLETED | OUTPATIENT
Start: 2021-05-20 | End: 2021-05-20

## 2021-05-20 RX ORDER — SODIUM CHLORIDE 0.9 % (FLUSH) 0.9 %
10 SYRINGE (ML) INJECTION EVERY 12 HOURS SCHEDULED
Status: DISCONTINUED | OUTPATIENT
Start: 2021-05-20 | End: 2021-05-20 | Stop reason: HOSPADM

## 2021-05-20 RX ORDER — ONDANSETRON 2 MG/ML
4 INJECTION INTRAMUSCULAR; INTRAVENOUS
Status: DISCONTINUED | OUTPATIENT
Start: 2021-05-20 | End: 2021-05-20 | Stop reason: HOSPADM

## 2021-05-20 RX ORDER — SODIUM CHLORIDE 0.9 % (FLUSH) 0.9 %
10 SYRINGE (ML) INJECTION EVERY 12 HOURS SCHEDULED
Status: DISCONTINUED | OUTPATIENT
Start: 2021-05-20 | End: 2021-05-20 | Stop reason: SDUPTHER

## 2021-05-20 RX ORDER — BALANCED SALT SOLUTION 6.4; .75; .48; .3; 3.9; 1.7 MG/ML; MG/ML; MG/ML; MG/ML; MG/ML; MG/ML
SOLUTION OPHTHALMIC
Status: COMPLETED | OUTPATIENT
Start: 2021-05-20 | End: 2021-05-20

## 2021-05-20 RX ORDER — OFLOXACIN 3 MG/ML
SOLUTION/ DROPS OPHTHALMIC
Status: COMPLETED | OUTPATIENT
Start: 2021-05-20 | End: 2021-05-20

## 2021-05-20 RX ORDER — TETRACAINE HYDROCHLORIDE 5 MG/ML
SOLUTION OPHTHALMIC
Status: COMPLETED | OUTPATIENT
Start: 2021-05-20 | End: 2021-05-20

## 2021-05-20 RX ORDER — SODIUM CHLORIDE 9 MG/ML
INJECTION, SOLUTION INTRAVENOUS CONTINUOUS
Status: DISCONTINUED | OUTPATIENT
Start: 2021-05-20 | End: 2021-05-20 | Stop reason: HOSPADM

## 2021-05-20 RX ORDER — LIDOCAINE HYDROCHLORIDE 10 MG/ML
INJECTION, SOLUTION EPIDURAL; INFILTRATION; INTRACAUDAL; PERINEURAL
Status: COMPLETED | OUTPATIENT
Start: 2021-05-20 | End: 2021-05-20

## 2021-05-20 RX ORDER — SODIUM CHLORIDE 0.9 % (FLUSH) 0.9 %
10 SYRINGE (ML) INJECTION PRN
Status: DISCONTINUED | OUTPATIENT
Start: 2021-05-20 | End: 2021-05-20 | Stop reason: HOSPADM

## 2021-05-20 RX ORDER — KETOROLAC TROMETHAMINE 5 MG/ML
1 SOLUTION OPHTHALMIC SEE ADMIN INSTRUCTIONS
Status: COMPLETED | OUTPATIENT
Start: 2021-05-20 | End: 2021-05-20

## 2021-05-20 RX ORDER — SODIUM CHLORIDE 9 MG/ML
25 INJECTION, SOLUTION INTRAVENOUS PRN
Status: DISCONTINUED | OUTPATIENT
Start: 2021-05-20 | End: 2021-05-20 | Stop reason: SDUPTHER

## 2021-05-20 RX ADMIN — PHENYLEPHRINE HYDROCHLORIDE 1 DROP: 25 SOLUTION/ DROPS OPHTHALMIC at 07:12

## 2021-05-20 RX ADMIN — TETRACAINE HYDROCHLORIDE 1 DROP: 5 SOLUTION OPHTHALMIC at 07:12

## 2021-05-20 RX ADMIN — CIPROFLOXACIN 1 DROP: 3 SOLUTION OPHTHALMIC at 07:08

## 2021-05-20 RX ADMIN — POVIDONE-IODINE 0.1 ML: 5 SOLUTION OPHTHALMIC at 07:12

## 2021-05-20 RX ADMIN — MIDAZOLAM 1 MG: 1 INJECTION INTRAMUSCULAR; INTRAVENOUS at 08:05

## 2021-05-20 RX ADMIN — SODIUM CHLORIDE: 9 INJECTION, SOLUTION INTRAVENOUS at 07:09

## 2021-05-20 RX ADMIN — PHENYLEPHRINE HYDROCHLORIDE 1 DROP: 25 SOLUTION/ DROPS OPHTHALMIC at 07:03

## 2021-05-20 RX ADMIN — TROPICAMIDE 1 DROP: 10 SOLUTION/ DROPS OPHTHALMIC at 07:08

## 2021-05-20 RX ADMIN — TETRACAINE HYDROCHLORIDE 1 DROP: 5 SOLUTION OPHTHALMIC at 07:08

## 2021-05-20 RX ADMIN — FENTANYL CITRATE 50 MCG: 50 INJECTION INTRAMUSCULAR; INTRAVENOUS at 08:05

## 2021-05-20 RX ADMIN — TROPICAMIDE 1 DROP: 10 SOLUTION/ DROPS OPHTHALMIC at 07:12

## 2021-05-20 RX ADMIN — PHENYLEPHRINE HYDROCHLORIDE 1 DROP: 25 SOLUTION/ DROPS OPHTHALMIC at 07:08

## 2021-05-20 RX ADMIN — TETRACAINE HYDROCHLORIDE 1 DROP: 5 SOLUTION OPHTHALMIC at 07:01

## 2021-05-20 RX ADMIN — KETOROLAC TROMETHAMINE 1 DROP: 5 SOLUTION/ DROPS OPHTHALMIC at 07:03

## 2021-05-20 RX ADMIN — CIPROFLOXACIN 1 DROP: 3 SOLUTION OPHTHALMIC at 07:03

## 2021-05-20 RX ADMIN — TROPICAMIDE 1 DROP: 10 SOLUTION/ DROPS OPHTHALMIC at 07:03

## 2021-05-20 ASSESSMENT — PAIN SCALES - GENERAL
PAINLEVEL_OUTOF10: 0
PAINLEVEL_OUTOF10: 0

## 2021-05-20 ASSESSMENT — PAIN - FUNCTIONAL ASSESSMENT: PAIN_FUNCTIONAL_ASSESSMENT: 0-10

## 2021-05-20 NOTE — ANESTHESIA PRE PROCEDURE
[facility] Department of Anesthesiology  Pre-Anesthesia Evaluation/Consultation       Name:  Lisbeth Jin  : 1940  Age:  78 y.o. MRN:  [id number]  Date: 2021           Surgeon: Surgeon(s):  Kimberly Kaiser MD    Procedure: Procedure(s):  RIGHT EYE Phacoemulsification with intraocular  lens implant     No Known Allergies  Patient Active Problem List   Diagnosis    [facility]     Past Medical History:   Diagnosis Date    [facility] [address]     [facility]     Hypertension     Seizures ([facility] 2013    X 1 dehydration     Thyroid disease      Past Surgical History:   Procedure Laterality Date    CATARACT REMOVAL WITH IMPLANT Left     COLONOSCOPY      HERNIA REPAIR      3/2021    KNEE SURGERY       Social History     Tobacco Use    Smoking status: Former Smoker     Types: Cigarettes     Quit date:      Years since quittin.4    Smokeless tobacco: Never Used   Vaping Use    Vaping Use: Never used   Substance Use Topics    Alcohol use: Yes     Comment: martini befor dinner     Drug use: Never     Medications  No current facility-administered medications on file prior to encounter.      Current Outpatient Medications on File Prior to Encounter   Medication Sig Dispense Refill    lisinopril (PRINIVIL;ZESTRIL) 5 MG tablet Take 5 mg by mouth daily      warfarin (COUMADIN) 4 MG tablet Take 6 mg by mouth daily Except 8 mg on  or as directed by [facility] Coumadin Service [phone]      dofetilide (TIKOSYN) 500 MCG capsule Take 500 mcg by mouth 2 times daily      aspirin 81 MG EC tablet Take 81 mg by mouth daily      finasteride (PROSCAR) 5 MG tablet Take 5 mg by mouth daily      levETIRAcetam (KEPPRA) 500 MG tablet Take 500 mg by mouth 2 times daily      levothyroxine (SYNTHROID) 50 MCG tablet Take 50 mcg by mouth Daily      metoprolol succinate (TOPROL XL) 50 MG extended release tablet Take 100 mg by mouth nightly       terazosin (HYTRIN) 10 MG capsule Take 10 mg by mouth nightly       Current Facility-Administered Medications   Medication Dose Route Frequency Provider Last Rate Last Admin    0.9 % sodium chloride infusion   Intravenous Continuous Shorty Keene  mL/hr at 21 0709 New Bag at 21 0709    sodium chloride flush 0.9 % injection 10 mL  10 mL Intravenous 2 times per day Shorty Keene MD        sodium chloride flush 0.9 % injection 10 mL  10 mL Intravenous PRN Shorty Keene MD        0.9 % sodium chloride infusion  25 mL Intravenous PRN Shorty Keene MD        phenylephrine (MYDFRIN) 2.5 % ophthalmic solution 1 drop  1 drop Right Eye See Admin Instructions Gricel Figueroa MD   1 drop at 21 0708    povidone-iodine 5 % ophthalmic solution 0.1 mL  1 drop Right Eye See Admin Instructions Gricel Figueroa MD        tetracaine (TETRAVISC) 0.5 % ophthalmic solution 1 drop  1 drop Right Eye See Admin Instructions Gricel Figueroa MD   1 drop at 21 0708    tropicamide (MYDRIACYL) 1 % ophthalmic solution 1 drop  1 drop Right Eye See Admin Instructions Gricel Figueroa MD   1 drop at 21 0708     Vital Signs (Current)   Vitals:    21 1018 21 0659   BP:  115/71   Pulse:  65   Resp:  20   Temp:  97.6 °F (36.4 °C)   TempSrc:  Temporal   SpO2:  97%   Weight: 235 lb (106.6 kg) 235 lb (106.6 kg)   Height: 6' 2\" (1.88 m) 6' 2\" (1.88 m)                                          BP Readings from Last 3 Encounters:   21 115/71     Vital Signs Statistics (for past 48 hrs)     Temp  Av.6 °F (36.4 °C)  Min: 97.6 °F (36.4 °C)   Min taken time: 21  Max: 97.6 °F (36.4 °C)   Max taken time: 21  Pulse  Av  Min: 72   Min taken time: 21  Max: 72   Max taken time: 21  Resp  Av  Min: 21   Min taken time: 21  Max: 20   Max taken time: 21  BP  Min: 115/71   Min taken time: 21 5773  Max: 115/71   Max taken time: 21 0659  SpO2  Av %  Min: 97 %   Min taken time: 21 0659  Max: 97 %   Max taken time: 21 0659  BP Readings from Last 3 Encounters:   21 115/71       BMI  Body mass index is 30.17 kg/m². Estimated body mass index is 30.17 kg/m² as calculated from the following:    Height as of this encounter: 6' 2\" (1.88 m). Weight as of this encounter: 235 lb (106.6 kg). CBC No results found for: WBC, RBC, HGB, HCT, MCV, RDW, PLT  CMP  No results found for: NA, K, CL, CO2, BUN, CREATININE, GFRAA, AGRATIO, LABGLOM, GLUCOSE, PROT, CALCIUM, BILITOT, ALKPHOS, AST, ALT  BMP  No results found for: NA, K, CL, CO2, BUN, CREATININE, CALCIUM, GFRAA, LABGLOM, GLUCOSE  POCGlucose  No results for input(s): GLUCOSE in the last 72 hours. Coags    Lab Results   Component Value Date    PROTIME 25.5 2020    INR 2.2 2021    INR 2.18      HCG (If Applicable) No results found for: PREGTESTUR, PREGSERUM, HCG, HCGQUANT   ABGs No results found for: PHART, PO2ART, ZQF5ERR, XTZ4UJF, BEART, I5WMVBVY   Type & Screen (If Applicable)  No results found for: LABABO, LABRH                         BMI: Wt Readings from Last 3 Encounters:       NPO Status:   Date of last liquid consumption: 21   Time of last liquid consumption:    Date of last solid food consumption: 21      Time of last solid consumption: 030       Anesthesia Evaluation  Patient summary reviewed no history of anesthetic complications:   Airway: Mallampati: II     Neck ROM: limited   Dental:          Pulmonary:       (-) pneumonia                           Cardiovascular:    (+) hypertension:, CHF: systolic, hyperlipidemia    (-) pacemaker                Neuro/Psych:   (+) seizures:,    (-) CVA           GI/Hepatic/Renal:             Endo/Other:    (+) hypothyroidism, blood dyscrasia: thrombocytopenia:., .                 Abdominal:           Vascular: negative vascular ROS. Anesthesia Plan      MAC     ASA 3       Induction: intravenous. Anesthetic plan and risks discussed with patient. Plan discussed with CRNA. Attending anesthesiologist reviewed and agrees with Pre Eval content              This pre-anesthesia assessment may be used as a history and physical.    DOS STAFF ADDENDUM:    Pt seen and examined, chart reviewed (including anesthesia, drug and allergy history). No interval changes to history and physical examination. Anesthetic plan, risks, benefits, alternatives, and personnel involved discussed with patient. Patient verbalized an understanding and agrees to proceed.       Suzanne Bedolla MD  May 20, 2021  7:11 AM

## 2021-05-20 NOTE — ANESTHESIA POSTPROCEDURE EVALUATION
Department of Anesthesiology  Postprocedure Note    Patient: Bryanna Fuentes  MRN: [id number]  YOB: 1940  Date of evaluation: 5/20/2021  Time:  8:30 AM     Procedure Summary     Date: 05/20/21 Room / Location: [facility]    Anesthesia Start: 0803 Anesthesia Stop: 3271    Procedure: RIGHT EYE Phacoemulsification with intraocular  lens implant (Right Eye) Diagnosis:       Combined forms of age-related cataract of right eye      (cataract of right eye)    Surgeons: Beatriz Mcmanus MD Responsible Provider: Mikaela Valdes MD    Anesthesia Type: MAC ASA Status: 3          Anesthesia Type: MAC    Jennifer Phase I: Jennifer Score: 10    Jennifer Phase II: Jennifer Score: 10    Last vitals: Reviewed and per EMR flowsheets.        Anesthesia Post Evaluation    Patient location during evaluation: bedside  Patient participation: complete - patient participated  Level of consciousness: awake  Pain score: 0  Airway patency: patent  Nausea & Vomiting: no nausea and no vomiting  Complications: no  Cardiovascular status: blood pressure returned to baseline  Respiratory status: acceptable  Hydration status: euvolemic

## 2021-05-20 NOTE — OP NOTE
Hayden Margot    OPERATIVE NOTE    Preoperative Diagnosis: Cataract right eye    Postoperative Diagnosis: Cataract right eye    Procedure: Phacoemulsification with intraocular lens implantation, right eye  Surgeon: Mara Tovar MD    Anesthesia: MAC, topical.    Complications: none    Estimated blood loss: less than 1 ml. Specimens: none    Indications for procedure: The patient is a 78y.o. year old with decreased vision, glare and halos around lights, and trouble with activities of daily living. Examination revealed a visually significant cataract in the right eye. Risks, benefits, and alternatives to surgery were discussed with the patient and the patient elected to proceed with phacoemulsification with lens implantation. Details of the procedure: Following informed consent, the patient was taken to the operating room and placed in the supine position. Monitored anesthesia care was administered. The eye was prepped and draped in the usual sterile fashion using aseptic technique for cataract surgery. A side port incision was made. 1% preservative free lidocaine was injected through the side port incision for topical anesthesia. The eye was filled with viscoelastic and a 2.4 mm keratome blade was used to make a 3-plane clear corneal incision in the temporal cornea. The cystitome was used to make a tear in the anterior capsule and a Utrata forceps was used to make a complete curvilinear capsulorrhexis. The lens was hydrodissected and freely rotated. Phacoemulsification was performed. Irrigation/aspiration was used to remove all cortical material from the capsular bag. The eye was filled with viscoelastic and a foldable posterior chamber intraocular lens was injected into the capsular bag. The lens was rotated to the appropriate axis as needed. Irrigation/aspiration was used to remove all excess viscoelastic.   The eye was pressurized with BSS and the wounds were check for leaks and none were found.  The patient had ofloxacin and Alphagan solutions placed on the eye. The patient went to the PACU in excellent condition, having tolerated the procedure well.

## 2021-05-28 ENCOUNTER — ANTI-COAG VISIT (OUTPATIENT)
Dept: PHARMACY | Age: 81
End: 2021-05-28
Payer: MEDICARE

## 2021-05-28 DIAGNOSIS — I48.91 ATRIAL FIBRILLATION, UNSPECIFIED TYPE (HCC): Primary | ICD-10-CM

## 2021-05-28 LAB — INTERNATIONAL NORMALIZATION RATIO, POC: 2.5

## 2021-05-28 PROCEDURE — 99211 OFF/OP EST MAY X REQ PHY/QHP: CPT

## 2021-05-28 PROCEDURE — 85610 PROTHROMBIN TIME: CPT

## 2021-05-28 NOTE — PROGRESS NOTES
Mr. Nunu Bautista is a 78 y.o.  male. Mr. Nunu Bautista had an INR test today. Results were reviewed and appropriate warfarin management was completed. THIS VISIT WAS PERFORMED AS: AN IN PERSON VISIT. PROTOCOLS WERE FOLLOWED WITH PRECAUTIONS TO REDUCE THE SPREAD OF COVID-19. Patient verifies current dosing regimen: Yes     Warfarin medication reviewed and updated on the patient 's home medication list: Yes   All other medications reviewed and updated on the patient 's home medication list: No new medications     Lab Results   Component Value Date    INR 2.5 2021    INR 2.2 2021    INR 1.7 2021           Anticoagulation Summary  As of 2021    INR goal:  2.0-3.0   TTR:  61.6 % (1.1 y)   INR used for dosin.5 (2021)   Warfarin maintenance plan:  8 mg (4 mg x 2) every Mon; 6 mg (4 mg x 1.5) all other days   Weekly warfarin total:  44 mg   Plan last modified:  48 [facility] (3/19/2021)   Next INR check:  2021   Priority:  Maintenance   Target end date: Indefinite    Indications    Afib ([facility] [address] [I48.91]             Anticoagulation Episode Summary     INR check location:  Anticoagulation Clinic    Preferred lab:      Send INR reminders to:  Marie Montague MANAGEMENT CLINICAL STAFF    Comments:  FAX Dr. Trenton Milton. Mayank Berman Q 6 weeks      Anticoagulation Care Providers     Provider Role Specialty Phone number    Riley Faust MD Referring Cardiology [phone]          Warfarin assessment / plan:   Patient appears well. No complaints regarding warfarin therapy. No change to weekly warfarin dosing. Description    CONTINUE: Warfarin 6mg daily except 8 mg on     Call with medications changes, especially antibiotics and steroids and including any over-the-counter medications or herbal products. Call if you stop any medications.     Keep the number of servings of Vitamin K (dark green vegetables) consistent from week to week  He eats a large salad with several vegetables every evening. Broccoli once per week. Spinach once per month. 2 Vodka cortneyis every evening         Immunization History   Administered Date(s) Administered    COVID-19, Moderna, PF, 100mcg/0.5mL 01/24/2021, 02/21/2021         Reviewed AVS with patient / caregiver.       CLINICAL PHARMACY CONSULT: MED RECONCILIATION/REVIEW ADDENDUM    For Pharmacy Admin Tracking Only     Intervention Detail:    Total # of Interventions Recommended: 0   Total # of Interventions Accepted: 0   Time Spent (min): 15

## 2021-07-09 ENCOUNTER — ANTI-COAG VISIT (OUTPATIENT)
Dept: PHARMACY | Age: 81
End: 2021-07-09
Payer: MEDICARE

## 2021-07-09 DIAGNOSIS — I48.91 ATRIAL FIBRILLATION, UNSPECIFIED TYPE (HCC): Primary | ICD-10-CM

## 2021-07-09 LAB — INTERNATIONAL NORMALIZATION RATIO, POC: 2.4

## 2021-07-09 PROCEDURE — 85610 PROTHROMBIN TIME: CPT

## 2021-07-09 PROCEDURE — 99211 OFF/OP EST MAY X REQ PHY/QHP: CPT

## 2021-07-09 NOTE — PROGRESS NOTES
Mr. Leala Moritz is a 78 y.o.  male. Mr. Leala Moritz had an INR test today. Results were reviewed and appropriate warfarin management was completed. This visit was performed as: An in person visit. Protocols were followed with precautions to reduce the spread of COVID-19. Patient verifies current dosing regimen: Yes     Warfarin medication reviewed and updated on the patient 's home medication list: Yes   All other medications reviewed and updated on the patient 's home medication list: No new medications     Lab Results   Component Value Date    INR 2.4 2021    INR 2.5 2021    INR 2.2 2021       Patient Findings     Negatives:  Signs/symptoms of thrombosis, Signs/symptoms of bleeding, Change in health, Missed doses, Change in medications, Change in diet/appetite, Bruising          Anticoagulation Summary  As of 2021    INR goal:  2.0-3.0   TTR:  65.2 % (1.3 y)   INR used for dosin.4 (2021)   Warfarin maintenance plan:  8 mg (4 mg x 2) every Mon; 6 mg (4 mg x 1.5) all other days   Weekly warfarin total:  44 mg   Plan last modified:  Elvie Valdovinos RPH (3/19/2021)   Next INR check:  2021   Priority:  Maintenance   Target end date: Indefinite    Indications    Afib ([address] [I48.91]             Anticoagulation Episode Summary     INR check location:  Anticoagulation Clinic    Preferred lab:      Send INR reminders to:  [facility] CLINICAL STAFF    Comments:  FAX Dr. Michelle Loredo. [facility]  [facility]  Q 6 weeks      Anticoagulation Care Providers     Provider Role Specialty Phone number    Izabel Jacinto MD Referring Cardiology [phone]          Warfarin assessment / plan:   Patient appears well. No complaints regarding warfarin therapy. No change to weekly warfarin dosing. Description    CONTINUE: Warfarin 6mg daily except 8 mg on     Call with medications changes, especially antibiotics and steroids and including any over-the-counter medications or herbal products.

## 2021-08-20 ENCOUNTER — ANTI-COAG VISIT (OUTPATIENT)
Dept: PHARMACY | Age: 81
End: 2021-08-20
Payer: MEDICARE

## 2021-08-20 DIAGNOSIS — I48.91 ATRIAL FIBRILLATION, UNSPECIFIED TYPE (HCC): Primary | ICD-10-CM

## 2021-08-20 LAB — INTERNATIONAL NORMALIZATION RATIO, POC: 2.4

## 2021-08-20 PROCEDURE — 99211 OFF/OP EST MAY X REQ PHY/QHP: CPT

## 2021-08-20 PROCEDURE — 85610 PROTHROMBIN TIME: CPT

## 2021-08-20 NOTE — PROGRESS NOTES
Mr. Genaro Lopez is a 78 y.o.  male. Mr. Genaro Lopez had an INR test today. Results were reviewed and appropriate warfarin management was completed. This visit was performed as: An in person visit. Protocols were followed with precautions to reduce the spread of COVID-19. Patient verifies current dosing regimen: Yes     Warfarin medication reviewed and updated on the patient 's home medication list: Yes   All other medications reviewed and updated on the patient 's home medication list: No new medications     Lab Results   Component Value Date    INR 2.4 2021    INR 2.4 2021    INR 2.5 2021       Patient Findings     Negatives:  Signs/symptoms of thrombosis, Signs/symptoms of bleeding, Change in health, Missed doses, Change in medications, Change in diet/appetite, Bruising          Anticoagulation Summary  As of 2021    INR goal:  2.0-3.0   TTR:  68.1 % (1.4 y)   INR used for dosin.4 (2021)   Warfarin maintenance plan:  8 mg (4 mg x 2) every Mon; 6 mg (4 mg x 1.5) all other days   Weekly warfarin total:  44 mg   Plan last modified:  Claudene Sers, RPH (3/19/2021)   Next INR check:  10/1/2021   Priority:  Maintenance   Target end date: Indefinite    Indications    Afib ([address] [I48.91]             Anticoagulation Episode Summary     INR check location:  Anticoagulation Clinic    Preferred lab:      Send INR reminders to:  Giuliana Padilla MANAGEMENT CLINICAL STAFF    Comments:  FAX Dr. Brandon Callejas. Bushra Whitaker Q 6 weeks      Anticoagulation Care Providers     Provider Role Specialty Phone number    Anne Bullard MD Referring Cardiology [phone]          Warfarin assessment / plan:   Patient appears well. No complaints regarding warfarin therapy. No change to weekly warfarin dosing. Description    CONTINUE: Warfarin 6mg daily except 8 mg on     Call with medications changes, especially antibiotics and steroids and including any over-the-counter medications or herbal products. Call if you stop any medications. Keep the number of servings of Vitamin K (dark green vegetables) consistent from week to week  He eats a large salad with several vegetables every evening. Broccoli once per week. Spinach once per month. 2 Vodka martinis every evening         Immunization History   Administered Date(s) Administered    COVID-19, Moderna, PF, 100mcg/0.5mL 01/24/2021, 02/21/2021         Reviewed AVS with patient / caregiver.       CLINICAL PHARMACY CONSULT: MED RECONCILIATION/REVIEW ADDENDUM    For Pharmacy Admin Tracking Only     Intervention Detail:    Total # of Interventions Recommended: 0   Total # of Interventions Accepted: 0   Time Spent (min): 15

## 2021-10-01 ENCOUNTER — ANTI-COAG VISIT (OUTPATIENT)
Dept: PHARMACY | Age: 81
End: 2021-10-01
Payer: MEDICARE

## 2021-10-01 DIAGNOSIS — I48.91 ATRIAL FIBRILLATION, UNSPECIFIED TYPE (HCC): Primary | ICD-10-CM

## 2021-10-01 LAB — INTERNATIONAL NORMALIZATION RATIO, POC: 2.2

## 2021-10-01 PROCEDURE — 99211 OFF/OP EST MAY X REQ PHY/QHP: CPT

## 2021-10-01 PROCEDURE — 85610 PROTHROMBIN TIME: CPT

## 2021-10-01 NOTE — PROGRESS NOTES
Mr. Sylvia Barrientos is a 78 y.o.  male. Mr. Sylvia Barrientos had an INR test today. Results were reviewed and appropriate warfarin management was completed. This visit was performed as: An in person visit. Protocols were followed with precautions to reduce the spread of COVID-19. Patient verifies current dosing regimen: Yes     Warfarin medication reviewed and updated on the patient 's home medication list: Yes   All other medications reviewed and updated on the patient 's home medication list: No new medications     Lab Results   Component Value Date    INR 2.2 10/01/2021    INR 2.4 2021    INR 2.4 2021       Patient Findings     Negatives:  Signs/symptoms of thrombosis, Signs/symptoms of bleeding, Change in health, Missed doses, Change in medications, Change in diet/appetite, Bruising          Anticoagulation Summary  As of 10/1/2021    INR goal:  2.0-3.0   TTR:  70.6 % (1.5 y)   INR used for dosin.2 (10/1/2021)   Warfarin maintenance plan:  8 mg (4 mg x 2) every Mon; 6 mg (4 mg x 1.5) all other days   Weekly warfarin total:  44 mg   Plan last modified:  Jesse Bonilla RPH (3/19/2021)   Next INR check:  2021   Priority:  Maintenance   Target end date: Indefinite    Indications    Afib ([facility] [address] [I48.91]             Anticoagulation Episode Summary     INR check location:  Anticoagulation Clinic    Preferred lab:      Send INR reminders to:  [facility] MANAGEMENT CLINICAL STAFF    Comments:  FAX Dr. Shameka Berrios. Leonel Shaw Q 6 weeks      Anticoagulation Care Providers     Provider Role Specialty Phone number    Ihsan Landin MD Referring Cardiology [phone]          Warfarin assessment / plan:   Patient appears well. No complaints regarding warfarin therapy. No change to weekly warfarin dosing.     Description    CONTINUE: Warfarin 6mg daily except 8 mg on     Call with medications changes, especially antibiotics and steroids and including any over-the-counter medications or herbal products. Call if you stop any medications. Keep the number of servings of Vitamin K (dark green vegetables) consistent from week to week  He eats a large salad with several vegetables every evening. Broccoli once per week. Spinach once per month. 2 Vodka martinis every evening         Immunization History   Administered Date(s) Administered    COVID-19, Moderna, PF, 100mcg/0.5mL 01/24/2021, 02/21/2021         Reviewed AVS with patient / caregiver.       CLINICAL PHARMACY CONSULT: MED RECONCILIATION/REVIEW ADDENDUM    For Pharmacy Admin Tracking Only     Intervention Detail:    Total # of Interventions Recommended: 0   Total # of Interventions Accepted: 0   Time Spent (min): 15

## 2021-11-12 ENCOUNTER — ANTI-COAG VISIT (OUTPATIENT)
Dept: PHARMACY | Age: 81
End: 2021-11-12
Payer: MEDICARE

## 2021-11-12 DIAGNOSIS — I48.91 ATRIAL FIBRILLATION, UNSPECIFIED TYPE (HCC): Primary | ICD-10-CM

## 2021-11-12 LAB — INTERNATIONAL NORMALIZATION RATIO, POC: 2.1

## 2021-11-12 PROCEDURE — 99211 OFF/OP EST MAY X REQ PHY/QHP: CPT

## 2021-11-12 PROCEDURE — 85610 PROTHROMBIN TIME: CPT

## 2021-11-12 NOTE — PROGRESS NOTES
Hardy Ryan is a 78 y.o. here for warfarin management. Tarik Robbins had an INR test today. Results were reviewed and appropriate warfarin management was completed. This visit was performed as: An in person visit. Protocols were followed with precautions to reduce the spread of COVID-19. Patient verifies current dosing regimen: Yes     Warfarin medication reviewed and updated on the patient 's home medication list: Yes   All other medications reviewed and updated on the patient 's home medication list: No new medications     Lab Results   Component Value Date    INR 2.1 2021    INR 2.2 10/01/2021    INR 2.4 2021       Patient Findings     Negatives:  Signs/symptoms of thrombosis, Signs/symptoms of bleeding, Change in health, Missed doses, Change in medications, Change in diet/appetite, Bruising          Anticoagulation Summary  As of 2021    INR goal:  2.0-3.0   TTR:  72.7 % (1.6 y)   INR used for dosin.1 (2021)   Warfarin maintenance plan:  8 mg (4 mg x 2) every Mon; 6 mg (4 mg x 1.5) all other days   Weekly warfarin total:  44 mg   Plan last modified:  Cintia Riggins RP (3/19/2021)   Next INR check:  2021   Priority:  Maintenance   Target end date: Indefinite    Indications    Afib ([facility] [address] [I48.91]             Anticoagulation Episode Summary     INR check location:  Anticoagulation Clinic    Preferred lab:      Send INR reminders to:  Ancelmo Lozoya MANAGEMENT CLINICAL STAFF    Comments:  FAX Dr. Derick Lacy. Pinky Angles Pinky Angles Q 6 weeks      Anticoagulation Care Providers     Provider Role Specialty Phone number    Becca Cosby MD Referring Cardiology [phone]          Warfarin assessment / plan:   Patient appears well. No complaints regarding warfarin therapy. Had a large bag of ad slaw last week. No change to weekly warfarin dosing.     Description    CONTINUE: Warfarin 6mg daily except 8 mg on     Call with medications changes, especially antibiotics and steroids and including any over-the-counter medications or herbal products. Call if you stop any medications. Keep the number of servings of Vitamin K (dark green vegetables) consistent from week to week  He eats a large salad with several vegetables every evening. Broccoli once per week. Spinach once per month. 2 Vodka martinis every evening         There is no immunization history for the selected administration types on file for this patient. Reviewed AVS with patient / caregiver.       CLINICAL PHARMACY CONSULT: MED RECONCILIATION/REVIEW ADDENDUM    For Pharmacy Admin Tracking Only     Intervention Detail:    Total # of Interventions Recommended: 0   Total # of Interventions Accepted: 0   Time Spent (min): 15

## 2021-12-21 ENCOUNTER — ANTI-COAG VISIT (OUTPATIENT)
Dept: PHARMACY | Age: 81
End: 2021-12-21
Payer: MEDICARE

## 2021-12-21 DIAGNOSIS — I48.91 ATRIAL FIBRILLATION, UNSPECIFIED TYPE (HCC): Primary | ICD-10-CM

## 2021-12-21 LAB — INTERNATIONAL NORMALIZATION RATIO, POC: 2.1

## 2021-12-21 PROCEDURE — 99211 OFF/OP EST MAY X REQ PHY/QHP: CPT

## 2021-12-21 PROCEDURE — 85610 PROTHROMBIN TIME: CPT

## 2021-12-21 NOTE — PROGRESS NOTES
Sylvia Barrientos is a 78 y.o. here for warfarin management. Shari Scwharz had an INR test today. Results were reviewed and appropriate warfarin management was completed. This visit was performed as: An in person visit. Protocols were followed with precautions to reduce the spread of COVID-19. Patient verifies current dosing regimen: Yes     Warfarin medication reviewed and updated on the patient 's home medication list: Yes   All other medications reviewed and updated on the patient 's home medication list: No new medications     Lab Results   Component Value Date    INR 2.1 2021    INR 2.1 2021    INR 2.2 10/01/2021       Patient Findings     Negatives:  Signs/symptoms of thrombosis, Signs/symptoms of bleeding, Change in health, Missed doses, Change in medications, Change in diet/appetite, Bruising          Anticoagulation Summary  As of 2021    INR goal:  2.0-3.0   TTR:  74.4 % (1.7 y)   INR used for dosin.1 (2021)   Warfarin maintenance plan:  8 mg (4 mg x 2) every Mon; 6 mg (4 mg x 1.5) all other days   Weekly warfarin total:  44 mg   Plan last modified:  Jesse Bonilla RPH (3/19/2021)   Next INR check:  2022   Priority:  Maintenance   Target end date: Indefinite    Indications    Afib ([facility] [address] [I48.91]             Anticoagulation Episode Summary     INR check location:  Anticoagulation Clinic    Preferred lab:      Send INR reminders to:  Denae Sanders MANAGEMENT CLINICAL STAFF    Comments:  FAX Dr. Shameka Berrios. Leonel Shaw Q 6 weeks      Anticoagulation Care Providers     Provider Role Specialty Phone number    Ihsan Landin MD Referring Cardiology [phone]          Warfarin assessment / plan:   Patient appears well. No complaints regarding warfarin therapy. No change to weekly warfarin dosing.     Description    CONTINUE: Warfarin 6mg daily except 8 mg on     Call with medications changes, especially antibiotics and steroids and including any over-the-counter medications or herbal products. Call if you stop any medications. Keep the number of servings of Vitamin K (dark green vegetables) consistent from week to week  He eats a large salad with several vegetables every evening. Broccoli once per week. Spinach once per month. 2 Vodka martinis every evening         There is no immunization history for the selected administration types on file for this patient. Reviewed AVS with patient / caregiver.       CLINICAL PHARMACY CONSULT: MED RECONCILIATION/REVIEW ADDENDUM    For Pharmacy Admin Tracking Only     Intervention Detail:    Total # of Interventions Recommended: 0   Total # of Interventions Accepted: 0   Time Spent (min): 15

## 2022-02-01 ENCOUNTER — TELEPHONE (OUTPATIENT)
Dept: PHARMACY | Age: 82
End: 2022-02-01

## 2022-02-11 ENCOUNTER — ANTI-COAG VISIT (OUTPATIENT)
Dept: PHARMACY | Age: 82
End: 2022-02-11
Payer: MEDICARE

## 2022-02-11 DIAGNOSIS — I48.91 ATRIAL FIBRILLATION, UNSPECIFIED TYPE (HCC): Primary | ICD-10-CM

## 2022-02-11 LAB — INTERNATIONAL NORMALIZATION RATIO, POC: 2.6

## 2022-02-11 PROCEDURE — 85610 PROTHROMBIN TIME: CPT

## 2022-02-11 PROCEDURE — 99211 OFF/OP EST MAY X REQ PHY/QHP: CPT

## 2022-04-01 ENCOUNTER — ANTI-COAG VISIT (OUTPATIENT)
Dept: PHARMACY | Age: 82
End: 2022-04-01
Payer: MEDICARE

## 2022-04-01 DIAGNOSIS — I48.91 ATRIAL FIBRILLATION, UNSPECIFIED TYPE (HCC): Primary | ICD-10-CM

## 2022-04-01 LAB — INTERNATIONAL NORMALIZATION RATIO, POC: 2.7

## 2022-04-01 PROCEDURE — 99211 OFF/OP EST MAY X REQ PHY/QHP: CPT

## 2022-04-01 PROCEDURE — 85610 PROTHROMBIN TIME: CPT

## 2022-04-01 NOTE — PROGRESS NOTES
Ani Mart is a 78 y.o. here for warfarin management. Kay Cranker had an INR test today. Results were reviewed and appropriate warfarin management was completed. This visit was performed as: An in person visit. Protocols were followed with precautions to reduce the spread of COVID-19. Patient verifies current dosing regimen: Yes     Warfarin medication reviewed and updated on the patient 's home medication list: Yes   All other medications reviewed and updated on the patient 's home medication list: No new medications     Lab Results   Component Value Date    INR 2.7 2022    INR 2.6 2022    INR 2.1 2021       Patient Findings     Negatives:  Signs/symptoms of thrombosis, Signs/symptoms of bleeding, Change in health, Missed doses, Change in medications, Change in diet/appetite, Bruising          Anticoagulation Summary  As of 2022    INR goal:  2.0-3.0   TTR:  78.0 % (2 y)   INR used for dosin.7 (2022)   Warfarin maintenance plan:  8 mg (4 mg x 2) every Mon; 6 mg (4 mg x 1.5) all other days   Weekly warfarin total:  44 mg   Plan last modified:  Yady Li RPH (3/19/2021)   Next INR check:  2022   Priority:  Maintenance   Target end date: Indefinite    Indications    Afib ([facility] [address] [I48.91]             Anticoagulation Episode Summary     INR check location:  Anticoagulation Clinic    Preferred lab:      Send INR reminders to:  Austin Rivera MANAGEMENT CLINICAL STAFF    Comments:  FAX Dr. Chrissie Biswas Q 6 weeks      Anticoagulation Care Providers     Provider Role Specialty Phone number    Gavino Schwarz MD Referring Cardiology [phone]          Warfarin assessment / plan:   Patient appears well. No complaints regarding warfarin therapy. No change to weekly warfarin dosing.     Description    CONTINUE: Warfarin 6mg daily except 8 mg on     Call with medications changes, especially antibiotics and steroids and including any over-the-counter medications or herbal products. Call if you stop any medications. Keep the number of servings of Vitamin K (dark green vegetables) consistent from week to week  He eats a large salad with several vegetables every evening. Broccoli once per week. Spinach once per month. 2 Vodka martinis every evening         Immunization History   Administered Date(s) Administered    COVID-19, [employer], Primary or Immunocompromised, PF, 100mcg/0.5mL 01/24/2021, 02/21/2021, 11/28/2021           Orders Placed This Encounter   Procedures    POCT INR     This external order was created through the results console. No orders of the defined types were placed in this encounter. Reviewed AVS with patient / caregiver.     Billing Points:  0 billing points this visit       CLINICAL PHARMACY CONSULT: MED RECONCILIATION/REVIEW ADDENDUM    For Pharmacy Admin Tracking Only     Intervention Detail:    Total # of Interventions Recommended: 0   Total # of Interventions Accepted: 0   Time Spent (min): 15

## 2022-04-15 ENCOUNTER — APPOINTMENT (OUTPATIENT)
Dept: GENERAL RADIOLOGY | Age: 82
End: 2022-04-15
Payer: MEDICARE

## 2022-04-15 ENCOUNTER — HOSPITAL ENCOUNTER (EMERGENCY)
Age: 82
Discharge: HOME | End: 2022-04-15
Attending: EMERGENCY MEDICINE
Payer: MEDICARE

## 2022-04-15 ENCOUNTER — ANTI-COAG VISIT (OUTPATIENT)
Dept: PHARMACY | Age: 82
End: 2022-04-15
Payer: MEDICARE

## 2022-04-15 VITALS
RESPIRATION RATE: 16 BRPM | BODY MASS INDEX: 31.26 KG/M2 | SYSTOLIC BLOOD PRESSURE: 124 MMHG | WEIGHT: 243.61 LBS | TEMPERATURE: 97.6 F | OXYGEN SATURATION: 96 % | HEART RATE: 64 BPM | HEIGHT: 74 IN | DIASTOLIC BLOOD PRESSURE: 75 MMHG

## 2022-04-15 DIAGNOSIS — S51.011A SKIN TEAR OF RIGHT ELBOW WITHOUT COMPLICATION, INITIAL ENCOUNTER: Primary | ICD-10-CM

## 2022-04-15 DIAGNOSIS — S59.901A ELBOW INJURY, RIGHT, INITIAL ENCOUNTER: ICD-10-CM

## 2022-04-15 DIAGNOSIS — M25.561 ACUTE PAIN OF RIGHT KNEE: ICD-10-CM

## 2022-04-15 DIAGNOSIS — I48.91 ATRIAL FIBRILLATION, UNSPECIFIED TYPE (HCC): Primary | ICD-10-CM

## 2022-04-15 LAB — INTERNATIONAL NORMALIZATION RATIO, POC: 2.7

## 2022-04-15 PROCEDURE — 85610 PROTHROMBIN TIME: CPT

## 2022-04-15 PROCEDURE — 99284 EMERGENCY DEPT VISIT MOD MDM: CPT

## 2022-04-15 PROCEDURE — 73080 X-RAY EXAM OF ELBOW: CPT

## 2022-04-15 PROCEDURE — 99211 OFF/OP EST MAY X REQ PHY/QHP: CPT

## 2022-04-15 ASSESSMENT — ENCOUNTER SYMPTOMS
VOMITING: 0
NAUSEA: 0
BACK PAIN: 0
SHORTNESS OF BREATH: 0
EYE PAIN: 0
ABDOMINAL PAIN: 0
PHOTOPHOBIA: 0

## 2022-04-15 ASSESSMENT — PAIN SCALES - GENERAL
PAINLEVEL_OUTOF10: 0

## 2022-04-15 ASSESSMENT — PAIN - FUNCTIONAL ASSESSMENT
PAIN_FUNCTIONAL_ASSESSMENT: 0-10
PAIN_FUNCTIONAL_ASSESSMENT: 0-10

## 2022-04-15 NOTE — ED TRIAGE NOTES
Pt came in via self with c/o fall that happened last night at 11pm. Pt states he was trying to get out of bed and put his slippers on when he fell and hit his right elbow. Pt denies hitting his head and loss of consciousness. Pt denies neck pain. Pt denies new or worsening headache. Right elbow is bruised and bleeding. Band-Aid is saturated. Pt is on warfarin. Pt states he also lives by himself. Pt denies the use of ambulatory aids or assistive devices. Pt wears hearing aids.

## 2022-04-15 NOTE — ED PROVIDER NOTES
[address]        Pt Name: Donya Moody  MRN: [id number]  [place] 1940  Date of evaluation: 4/15/2022  Provider: Paty Calvo MD  [facility]       Chief Complaint   Patient presents with   Di Baeza     pt fell last night around 11pm; pt states he was trying to get his slippers on getting out of bed and fell; right elbow was injured       HISTORY OFPRESENT ILLNESS   (Location/Symptom, Timing/Onset, Context/Setting, Quality, Duration, Modifying Factors,Severity)  Note limiting factors. Donya Moody is a 78 y.o. male presenting today due to concern for trying to get out of bed and ultimately slipping with his slippers on and hitting his right elbow on the hardwood floor and sustaining a skin tear to the elbow following this. He is positive that he did not hit his head although did have a glass in his other arm and states that the liquid spilled out of the glass but the glass did not break during the fall. He denied feeling lightheaded or dizzy before the fall or currently. He walked into the emergency department today and has no trouble walking. He did report slight right knee discomfort but states he is not worried about this. He is on Coumadin. He denies any headache or neck pain. He denies any syncope or LOC. No chest pain or shortness of breath. No abdominal pain or back pain. No nausea or vomiting. No visual changes or eye pain. He is right-handed. He believes his last tetanus shot was 4 years ago. Due to concern for the skin tear involving the right elbow along with some elbow discomfort, he came to the emergency department for further assessment. He has no other complaints at this time besides for the right elbow. He denies any numbness or weakness in the arms or legs.         REVIEW OF SYSTEMS    (2-9 systems for level 4, 10 or more for level 5)     Review of Systems   Constitutional: Negative for fatigue. HENT: Negative for congestion. Eyes: Negative for photophobia, pain and visual disturbance. Respiratory: Negative for shortness of breath. Cardiovascular: Negative for chest pain. Gastrointestinal: Negative for abdominal pain, nausea and vomiting. Genitourinary: Negative for flank pain. Musculoskeletal: Positive for arthralgias (slight to right knee, mild to right elbow). Negative for back pain, gait problem, neck pain and neck stiffness. Skin: Positive for wound (skin tear to right elbow). Neurological: Negative for dizziness, syncope, weakness, light-headedness, numbness and headaches. Hematological: Bruises/bleeds easily (on coumadin). Psychiatric/Behavioral: Negative for confusion. The patient is not nervous/anxious. Positives and Pertinent negatives as per HPI.       PASTMEDICAL HISTORY     Past Medical History:   Diagnosis Date    Atrial fibrillation ([facility]     CHF (congestive heart failure) (HCC)     Hypertension     Seizures ([facility] 2013    X 1 dehydration     Thyroid disease          SURGICAL HISTORY       Past Surgical History:   Procedure Laterality Date    CATARACT REMOVAL WITH IMPLANT Left     COLONOSCOPY      HERNIA REPAIR      3/2021    INTRACAPSULAR CATARACT EXTRACTION Right 5/20/2021    RIGHT EYE Phacoemulsification with intraocular  lens implant performed by Pilar Tanner MD at [address] [facility]       Current Discharge Medication List      CONTINUE these medications which have NOT CHANGED    Details   lisinopril (PRINIVIL;ZESTRIL) 5 MG tablet Take 5 mg by mouth daily      warfarin (COUMADIN) 4 MG tablet Take 6 mg by mouth daily Except 8 mg on Mondays or as directed by [facility] Coumadin [facility] [phone]  Goal INR = 2-3      dofetilide (TIKOSYN) 500 MCG capsule Take 500 mcg by mouth 2 times daily      aspirin 81 MG EC tablet Take 81 mg by mouth daily      finasteride (PROSCAR) 5 MG tablet Take 5 mg by mouth daily      levETIRAcetam (KEPPRA) 500 MG tablet Take 500 mg by mouth 2 times daily      levothyroxine (SYNTHROID) 50 MCG tablet Take 50 mcg by mouth Daily      metoprolol succinate (TOPROL XL) 50 MG extended release tablet Take 100 mg by mouth nightly       terazosin (HYTRIN) 10 MG capsule Take 10 mg by mouth nightly             ALLERGIES     Patient has no known allergies. FAMILY HISTORY     History reviewed. No pertinent family history. SOCIAL HISTORY       Social History     Socioeconomic History    Marital status:      Spouse name: None    Number of children: None    Years of education: None    Highest education level: None   Occupational History    None   Tobacco Use    Smoking status: Former Smoker     Types: Cigarettes     Quit date:      Years since quittin.3    Smokeless tobacco: Never Used   Vaping Use    Vaping Use: Never used   Substance and Sexual Activity    Alcohol use: Yes     Comment: martini before dinner     Drug use: Never    Sexual activity: Not Currently   Other Topics Concern    None   Social History Narrative    None     Social Determinants of Health     Financial Resource Strain:     Difficulty of Paying Living Expenses: Not on file   Food Insecurity:     Worried About Running Out of Food in the Last Year: Not on file    Germán of Food in the Last Year: Not on file   Transportation Needs:     Lack of Transportation (Medical): Not on file    Lack of Transportation (Non-Medical):  Not on file   Physical Activity:     Days of Exercise per Week: Not on file    Minutes of Exercise per Session: Not on file   Stress:     Feeling of Stress : Not on file   Social Connections:     Frequency of Communication with Friends and Family: Not on file    Frequency of Social Gatherings with Friends and Family: Not on file    Attends Orthodox Services: Not on file    Active Member of Clubs or Organizations: Not on file    Attends Club or Organization Meetings: Not on file    Marital Status: Not on file   Intimate Partner Violence:     Fear of Current or Ex-Partner: Not on file    Emotionally Abused: Not on file    Physically Abused: Not on file    Sexually Abused: Not on file   Housing Stability:     Unable to Pay for Housing in the Last Year: Not on file    Number of Josselyn in the Last Year: Not on file    Unstable Housing in the Last Year: Not on file       SCREENINGS    [place] Coma Scale  Eye Opening: Spontaneous  Best Verbal Response: Oriented  Best Motor Response: Obeys commands  [place] Coma Scale Score: 15           PHYSICAL EXAM    (up to 7 for level 4, 8 or more for level 5)     ED Triage Vitals   BP Temp Temp src Pulse Resp SpO2 Height Weight   -- -- -- -- -- -- -- --       Physical Exam  Vitals and nursing note reviewed. Constitutional:       General: He is awake. He is not in acute distress. Appearance: Normal appearance. He is well-developed and well-groomed. He is obese. He is not ill-appearing, toxic-appearing or diaphoretic. Interventions: He is not intubated. HENT:      Head: Normocephalic and atraumatic. No raccoon eyes, Martinez's sign, abrasion, contusion, masses, right periorbital erythema, left periorbital erythema or laceration. Hair is normal.      Right Ear: External ear normal.      Left Ear: External ear normal.      Nose: Nose normal.      Mouth/Throat:      Mouth: Mucous membranes are moist.   Eyes:      General: No scleral icterus. Right eye: No discharge. Left eye: No discharge. Conjunctiva/sclera: Conjunctivae normal.      Pupils: Pupils are equal, round, and reactive to light. Neck:      Trachea: Trachea and phonation normal. No tracheal deviation. Cardiovascular:      Rate and Rhythm: Normal rate and regular rhythm. Pulses: Normal pulses. Radial pulses are 2+ on the right side and 2+ on the left side.    Pulmonary:      Effort: Pulmonary effort is normal. No tachypnea, bradypnea, accessory muscle usage, prolonged expiration, respiratory distress or retractions. He is not intubated. Breath sounds: Normal breath sounds and air entry. No stridor. No decreased breath sounds, wheezing, rhonchi or rales. Chest:      Chest wall: No tenderness. Abdominal:      General: Abdomen is flat. Bowel sounds are normal. There is no distension. Palpations: Abdomen is soft. Tenderness: There is no abdominal tenderness. There is no right CVA tenderness, left CVA tenderness, guarding or rebound. Musculoskeletal:         General: Signs of injury (skin tear to right elbow only) present. No swelling, tenderness or deformity. Normal range of motion. Right shoulder: Normal.      Left shoulder: Normal.      Right elbow: Laceration (skin tear only - 8 cm ) present. No swelling, deformity or effusion. Normal range of motion. No tenderness. Left elbow: Normal.      Right wrist: Normal. No lacerations, tenderness, bony tenderness or snuff box tenderness. Normal range of motion. Right hand: Normal. No swelling, deformity, lacerations, tenderness or bony tenderness. Normal range of motion. Normal strength. Normal sensation. Left hand: Normal.      Cervical back: Full passive range of motion without pain, normal range of motion and neck supple. No edema, erythema, signs of trauma, rigidity, torticollis, tenderness, bony tenderness or crepitus. No pain with movement, spinous process tenderness or muscular tenderness. Normal range of motion. Thoracic back: No tenderness or bony tenderness. Lumbar back: No tenderness or bony tenderness. Right lower leg: No edema. Left lower leg: No edema. Comments: MSK: Normal range of motion of bilateral shoulders, elbows, wrists, hips, knees, ankles and nontender to palpation of all joints (including right elbow and right knee)       Skin:     General: Skin is warm and dry.       Coloration: Skin is not ashen, cyanotic, jaundiced or pale. Findings: Abrasion, bruising, signs of injury and laceration (8 cm skin tear to right elbow, no deep laceration seen) present. No abscess, erythema or rash. Neurological:      General: No focal deficit present. Mental Status: He is alert and oriented to person, place, and time. Mental status is at baseline. GCS: GCS eye subscore is 4. GCS verbal subscore is 5. GCS motor subscore is 6. Cranial Nerves: No dysarthria. Sensory: Sensation is intact. No sensory deficit. Motor: Motor function is intact. No weakness, tremor, atrophy, abnormal muscle tone or seizure activity. Comments: Normal leg extension bilaterally and normal  strength bilaterally with strength 5 out of 5 to all 4 extremities   Psychiatric:         Attention and Perception: Attention normal.         Mood and Affect: Mood and affect normal.         Speech: Speech normal. Speech is not slurred. Behavior: Behavior normal. Behavior is cooperative. DIAGNOSTIC RESULTS   :    Labs Reviewed   PROTIME-INR       All other labs were within normal range or not returned asof this dictation. EKG: All EKG's are interpreted by the Emergency Department Physician who either signs or Co-signs this chart in the absence of a cardiologist.        RADIOLOGY:   Non-plain film images such as CT, Ultrasound and MRI are read by the radiologist. Tammy Rocker images are visualized and preliminarily interpreted by the  ED Provider with the belowfindings:        Interpretation per the Radiologist below, if available at the time of this note:    XR ELBOW RIGHT (MIN 3 VIEWS)   Preliminary Result   1. No acute osseous abnormality. No evidence of joint effusion. 2. Nonspecific soft tissue swelling overlying the olecranon.                PROCEDURES   Unless otherwise noted below, none     Lac Repair    Date/Time: 4/15/2022 12:30 PM  Performed by: Xiomara Arias MD  Authorized by: Itz Toro MD     Consent:     Consent obtained:  Verbal    Consent given by:  Patient    Risks discussed:  Poor cosmetic result, poor wound healing, need for additional repair and infection    Alternatives discussed:  No treatment  Anesthesia (see MAR for exact dosages): Anesthesia method:  None  Laceration details:     Location:  Shoulder/arm    Shoulder/arm location:  R elbow    Length (cm):  8    Depth (mm):  1  Repair type:     Repair type:  Simple  Pre-procedure details:     Preparation:  Imaging obtained to evaluate for foreign bodies  Exploration:     Hemostasis achieved with:  Direct pressure    Wound exploration: entire depth of wound probed and visualized      Wound extent: no foreign bodies/material noted      Contaminated: no    Treatment:     Area cleansed with:  Saline    Amount of cleaning:  Standard    Irrigation solution:  Sterile saline    Irrigation method:  Syringe    Visualized foreign bodies/material removed: no    Skin repair:     Repair method:  Steri-Strips    Number of Steri-Strips:  6  Approximation:     Approximation:  Close  Post-procedure details:     Dressing:  Sterile dressing and non-adherent dressing    Patient tolerance of procedure: Tolerated well, no immediate complications        CRITICAL CARE TIME   N/A    CONSULTS:  None    EMERGENCY DEPARTMENT COURSE and DIFFERENTIAL DIAGNOSIS/MDM:   Vitals:    Vitals:    04/15/22 0937   BP: 115/73   Pulse: 64   Resp: 18   Temp: 97.6 °F (36.4 °C)   TempSrc: Tympanic   SpO2: 97%   Weight: 243 lb 9.7 oz (110.5 kg)   Height: 6' 2\" (1.88 m)       Patient was given the following medications:  Medications - No data to display    Patient was evaluated due to slipping when trying to use his slippers last night when getting out of bed and hitting his right elbow.   He does report slight discomfort to the right elbow with obvious skin tear but there was no obvious tenderness to palpation during exam.  He believes his tetanus is up-to-date and therefore this would not be updated today. No signs of infection at this time. Skin tear was cleaned and then Steri-strips were placed on the wound to help with healing. Based on story, this was a mechanical fall and he denied feeling lightheaded or dizzy before the fall. He did not hit his head and at this time is declining head CT even though he is aware that since he is on Coumadin he is at increased risk for bleeding. He knows to return to the emergency department for any development of headache, vomiting, confusion, numbness or weakness in the arms or legs, or any other concerns related to the fall, but otherwise follow-up with primary doctor over the next 1 to 2 weeks for any other concerns. His PT/INR was in appropriate range. His elbow x-ray did not show any fracture. He ambulated without any difficulty out of the department and I did witness this. He was well-appearing and in no acute distress at time of discharge and felt comfortable with this plan. He decline knee x-ray and states other than slight discomfort to the right knee, he was not worried about it and did not notice knee pain until this morning and the fall was last night. The patient tolerated their visit well. The patient and / or the family were informed of the results of any tests, a time was given to answer questions. FINAL IMPRESSION      1.  Skin tear of right elbow without complication, initial encounter    2. Elbow injury, right, initial encounter          DISPOSITION/PLAN   DISPOSITION  -discharged in improved, stable condition      PATIENT REFERRED TO:  [facility]  [address] [id number]  [phone]  Go to   If symptoms worsen    [facility] [phone]    In 3 days  For wound re-check, As needed    Kaelyn Cardona MD  [facility], [address] [place] [address] [address]  [address] [facility]  [phone]    Call   As needed      DISCHARGEMEDICATIONS:  Current Discharge Medication List          DISCONTINUED MEDICATIONS:  Current Discharge Medication List                 (Please note that portions of this note were completed with a voicerecognition program.  Efforts were made to edit the dictations but occasionally words are mis-transcribed.)    Sundar Celeste MD (electronically signed)            Sundar Celeste MD  04/15/22 9649

## 2022-04-15 NOTE — ED NOTES
Dr. Dylan Beltran placed steri strips on right elbow skin tear. Patient tolerated well. Skin tear covered with adaptic guaze and covered with 4x4 and wrapped with kerlex. Patient tolerated well.       Larry Healy RN  04/15/22 4638

## 2022-04-15 NOTE — ED NOTES
PT/INR completed by Mejia Mcginnis patient is a coumadin clinic patient. INR 2.7.      Mariam Castillo RN  04/15/22 7874

## 2022-04-15 NOTE — ED NOTES
Discharge instructions reviewed. Patient verbalized understanding. Patient will follow up with PCP. Patient accompanied by RN patient had no issues of dizziness or any pain. Patient tolerated well.       Gilmar Jennings RN  04/15/22 3666

## 2022-04-15 NOTE — PROGRESS NOTES
Kelle Moore is a 78 y.o. here for warfarin management. Brad Amador had an INR test today. Results were reviewed and appropriate warfarin management was completed. This visit was performed as:  Seen in emergency room at [facility]    Patient verifies current dosing regimen: Yes     Warfarin medication reviewed and updated on the patient 's home medication list: Yes   All other medications reviewed and updated on the patient 's home medication list: No new medications     Lab Results   Component Value Date    INR 2.7 04/15/2022    INR 2.7 2022    INR 2.6 2022       Patient Findings     Positives:  Emergency department visit    Negatives:  Signs/symptoms of thrombosis, Signs/symptoms of bleeding, Change in health, Missed doses, Change in medications, Change in diet/appetite, Bruising    Comments:  Due to fall          Anticoagulation Summary  As of 4/15/2022    INR goal:  2.0-3.0   TTR:  78.4 % (2 y)   INR used for dosin.7 (4/15/2022)   Warfarin maintenance plan:  8 mg (4 mg x 2) every Mon; 6 mg (4 mg x 1.5) all other days   Weekly warfarin total:  44 mg   Plan last modified:  Joe Owen [facility] (3/19/2021)   Next INR check:  6/3/2022   Priority:  Maintenance   Target end date: Indefinite    Indications    Afib ([facility] [address] [I48.91]             Anticoagulation Episode Summary     INR check location:  Anticoagulation Clinic    Preferred lab:      Send INR reminders to:  Rohan Daniels MANAGEMENT CLINICAL STAFF    Comments:  FAX Dr. Jalen Posada. Randi Espitiaing Randi Billing Q 6 weeks      Anticoagulation Care Providers     Provider Role Specialty Phone number    Esperanza Bragg MD Referring Cardiology [phone]          Warfarin assessment / plan:   Patient appears well. No complaints regarding warfarin therapy. Seen in emergency room at [facility] due to fall. Has a skin tear of right elbow without complication. Instruct patient to call if he begins an antibiotic. No change to weekly warfarin dosing.     Description CONTINUE: Warfarin 6mg daily except 8 mg on Mondays    Call with medications changes, especially antibiotics and steroids and including any over-the-counter medications or herbal products. Call if you stop any medications. Keep the number of servings of Vitamin K (dark green vegetables) consistent from week to week  He eats a large salad with several vegetables every evening. Broccoli once per week. Spinach once per month. 2 Vodka martinis every evening         Immunization History   Administered Date(s) Administered    COVID-19, Lorena Shine, Primary or Immunocompromised, PF, 100mcg/0.5mL 01/24/2021, 02/21/2021, 11/28/2021           Orders Placed This Encounter   Procedures    POCT INR     This external order was created through the results console. No orders of the defined types were placed in this encounter. Reviewed AVS with patient / caregiver.     Billing Points:  0 billing points this visit       CLINICAL PHARMACY CONSULT: MED RECONCILIATION/REVIEW ADDENDUM    For Pharmacy Admin Tracking Only     Intervention Detail:    Total # of Interventions Recommended: 1   Total # of Interventions Accepted: 1   Time Spent (min): 15

## 2022-04-17 ENCOUNTER — HOSPITAL ENCOUNTER (EMERGENCY)
Age: 82
Discharge: HOME | End: 2022-04-17
Attending: EMERGENCY MEDICINE
Payer: MEDICARE

## 2022-04-17 VITALS
WEIGHT: 242.06 LBS | DIASTOLIC BLOOD PRESSURE: 72 MMHG | HEART RATE: 65 BPM | RESPIRATION RATE: 18 BRPM | OXYGEN SATURATION: 97 % | SYSTOLIC BLOOD PRESSURE: 129 MMHG | TEMPERATURE: 97.2 F | HEIGHT: 74 IN | BODY MASS INDEX: 31.07 KG/M2

## 2022-04-17 DIAGNOSIS — Z51.89 VISIT FOR WOUND CHECK: Primary | ICD-10-CM

## 2022-04-17 DIAGNOSIS — S51.019D: ICD-10-CM

## 2022-04-17 PROCEDURE — 99284 EMERGENCY DEPT VISIT MOD MDM: CPT

## 2022-04-17 NOTE — ED TRIAGE NOTES
Patient to [facility] [address] ambulatory for dressing to be rewrapped since dressing falling off right elbow- Dr Eric Bunch at bedside to remove dressing and photograph wound- polysporin ointment and adaptic non stick dressing applied with 4X4, kerlex and coban dressing.  Patient will be given wound clinic number for follow-up

## 2022-04-17 NOTE — ED PROVIDER NOTES
[address]      Pt Name: Blanca Corcoran  MRN: [id number]  [place] 1940  Date of evaluation: 4/17/2022  Provider: Ellen Horn MD    CHIEF COMPLAINT     I fell on Thursday and I was here on Friday and they put a bandage on my arm but it is falling off and I am right-handed so I could not fix it  HISTORY OF PRESENT ILLNESS  (Location/Symptom, Timing/Onset,Context/Setting, Quality, Duration, Modifying Factors, Severity). Note limiting factors. Chief Complaint   Patient presents with    Wound Check     dressing to right elbow slipping off after ED visit 2 days ago- requests redress      Blanca Corcoran is a 78 y.o. male who presents to the emergency department secondary to concern for concerns of wound bandage he had placed on Friday is falling off. He denies any new pain, no injury. No fevers chills nausea vomiting. He has not taken the bandage off since he was here. He did not know he could also follow-up with wound care. Past medical history noted below, significant for A. fib on Coumadin, heart failure, hypertension, seizures, thyroid disease. Quit smoking. Aside from what is stated above denies any other symptoms or modifying factors. Nursing Notes reviewed.     REVIEW OF SYSTEMS  (2-9 systems for level 4, 10 or more for level 5)   Review of Systems  Pertinent positive and negative findings as documented in the HPI;   PAST MEDICAL HISTORY     Past Medical History:   Diagnosis Date    Atrial fibrillation ([address]     CHF (congestive heart failure) (HCC)     Hypertension     Seizures ([address] 2013    X 1 dehydration     Thyroid disease        SURGICALHISTORY       Past Surgical History:   Procedure Laterality Date    CATARACT REMOVAL WITH IMPLANT Left     COLONOSCOPY      HERNIA REPAIR      3/2021    INTRACAPSULAR CATARACT EXTRACTION Right 5/20/2021    RIGHT EYE Phacoemulsification with intraocular  lens implant performed by Ema Myers Ji Lutz MD at [address] [place] [address]       Previous Medications    ASPIRIN 81 MG EC TABLET    Take 81 mg by mouth daily    DOFETILIDE (TIKOSYN) 500 MCG CAPSULE    Take 500 mcg by mouth 2 times daily    FINASTERIDE (PROSCAR) 5 MG TABLET    Take 5 mg by mouth daily    LEVETIRACETAM (KEPPRA) 500 MG TABLET    Take 500 mg by mouth 2 times daily    LEVOTHYROXINE (SYNTHROID) 50 MCG TABLET    Take 50 mcg by mouth Daily    LISINOPRIL (PRINIVIL;ZESTRIL) 5 MG TABLET    Take 5 mg by mouth daily    METOPROLOL SUCCINATE (TOPROL XL) 50 MG EXTENDED RELEASE TABLET    Take 100 mg by mouth nightly     TERAZOSIN (HYTRIN) 10 MG CAPSULE    Take 10 mg by mouth nightly    WARFARIN (COUMADIN) 4 MG TABLET    Take 6 mg by mouth daily Except 8 mg on  or as directed by [facility] Coumadin Service [phone]  Goal INR = 2-3      ALLERGIES     Patient has no known allergies. FAMILY HISTORY     History reviewed. No pertinent family history. SOCIAL HISTORY       Social History     Socioeconomic History    Marital status:      Spouse name: None    Number of children: None    Years of education: None    Highest education level: None   Occupational History    None   Tobacco Use    Smoking status: Former Smoker     Types: Cigarettes     Quit date:      Years since quittin.3    Smokeless tobacco: Never Used   Vaping Use    Vaping Use: Never used   Substance and Sexual Activity    Alcohol use: Yes     Comment: martini before dinner     Drug use: Never    Sexual activity: Not Currently   Other Topics Concern    None   Social History Narrative    None     Social Determinants of Health     Financial Resource Strain:     Difficulty of Paying Living Expenses: Not on file   Food Insecurity:     Worried About Running Out of Food in the Last Year: Not on file    Germán of Food in the Last Year: Not on file   Transportation Needs:     Lack of Transportation (Medical):  Not on file    Lack of Transportation (Non-Medical): Not on file   Physical Activity:     Days of Exercise per Week: Not on file    Minutes of Exercise per Session: Not on file   Stress:     Feeling of Stress : Not on file   Social Connections:     Frequency of Communication with Friends and Family: Not on file    Frequency of Social Gatherings with Friends and Family: Not on file    Attends Cheondoism Services: Not on file    Active Member of [address] [place] [address] or Organizations: Not on file    Attends Club or Organization Meetings: Not on file    Marital Status: Not on file   Intimate Partner Violence:     Fear of Current or Ex-Partner: Not on file    Emotionally Abused: Not on file    Physically Abused: Not on file    Sexually Abused: Not on file   Housing Stability:     Unable to Pay for Housing in the Last Year: Not on file    Number of [place] in the Last Year: Not on file    Unstable Housing in the Last Year: Not on file     SCREENINGS    Bradley Coma Scale  Eye Opening: Spontaneous  Best Verbal Response: Oriented  Best Motor Response: Obeys commands  Bradley Coma Scale Score: 15    PHYSICAL EXAM  (up to 7 for level 4, 8 or more for level 5)   INITIAL VITALS: BP: 129/72, Temp: 97.2 °F (36.2 °C), Pulse: 65, Resp: 18, SpO2: 97 %   Physical Exam  Vitals reviewed. Constitutional:       General: He is not in acute distress. Appearance: He is not ill-appearing, toxic-appearing or diaphoretic. HENT:      Head: Normocephalic and atraumatic. Right Ear: External ear normal.      Left Ear: External ear normal.      Nose: Nose normal.   Eyes:      General: No scleral icterus. Right eye: No discharge. Left eye: No discharge. Conjunctiva/sclera: Conjunctivae normal.   Neck:      Trachea: No tracheal deviation. Cardiovascular:      Rate and Rhythm: Normal rate. Pulmonary:      Effort: Pulmonary effort is normal. No respiratory distress.    Musculoskeletal:      Cervical back: Normal range of motion. Skin:     Capillary Refill: Capillary refill takes less than 2 seconds. Findings: Lesion (Bruising and wound noted to right elbow, see image below) present. Neurological:      General: No focal deficit present. Mental Status: He is alert and oriented to person, place, and time. Psychiatric:         Behavior: Behavior normal.                 DIAGNOSTIC RESULTS     RADIOLOGY:   Interpretation per Radiologist below, if available at the time of this note:  No orders to display     LABS:  Labs Reviewed - No data to display    [address] [place] [address] and DIFFERENTIAL DIAGNOSIS/MDM:   Patient was given the following medications:  No orders of the defined types were placed in this encounter. CONSULTS:  None    INITIAL VITALS: BP: 129/72, Temp: 97.2 °F (36.2 °C), Pulse: 65, Resp: 18, SpO2: 97 %   RECENT VITALS:  BP: 129/72,Temp: 97.2 °F (36.2 °C), Pulse: 65, Resp: 18, SpO2: 97 %     Feroz Ludwig is a 78 y.o. male who presents to the emergency department secondary to concern for symptoms as noted in HPI. On arrival he is awake, alert, oriented. Vitals are hemodynamically stable. Physical exam when the bandage was taken down shows a wound to the right elbow. He maintains good range of motion, no no tenderness to palpation. The wound still has minimal oozing. It was redressed by both the nurse and myself with bacitracin, Adaptic, 4 x 4, Kerlix, and Coban. He was given information for follow-up with the wound care clinic. He expressed understanding of all instructions, was in agreement with plan, and was discharged home in stable condition. FINAL IMPRESSION      1. Visit for wound check    2.  Skin tear of elbow without complication, subsequent encounter        DISPOSITION/PLAN   DISPOSITION Decision To Discharge 04/17/2022 09:28:29 AM      PATIENT REFERRED TO:  [facility]  [phone]  Call   For follow up appointment    [address]  [facility] [address]          DISCHARGE MEDICATIONS:  New Prescriptions    No medications on file            (Please note that portions of this note were completed with a voice recognition program. Efforts were made to edit the dictations but occasionally words are mis-transcribed.)    Luis Romero MD (electronically signed)  Attending Emergency Physician     Luis Romero MD  04/17/22 9895

## 2022-04-18 ENCOUNTER — HOSPITAL ENCOUNTER (EMERGENCY)
Age: 82
Discharge: HOME | End: 2022-04-18
Attending: EMERGENCY MEDICINE
Payer: MEDICARE

## 2022-04-18 VITALS
WEIGHT: 242.95 LBS | BODY MASS INDEX: 31.18 KG/M2 | RESPIRATION RATE: 16 BRPM | OXYGEN SATURATION: 99 % | TEMPERATURE: 98 F | HEIGHT: 74 IN | DIASTOLIC BLOOD PRESSURE: 54 MMHG | HEART RATE: 72 BPM | SYSTOLIC BLOOD PRESSURE: 91 MMHG

## 2022-04-18 DIAGNOSIS — S51.011D SKIN TEAR OF RIGHT ELBOW WITHOUT COMPLICATION, SUBSEQUENT ENCOUNTER: Primary | ICD-10-CM

## 2022-04-18 DIAGNOSIS — I95.9 HYPOTENSION, UNSPECIFIED HYPOTENSION TYPE: ICD-10-CM

## 2022-04-18 PROCEDURE — 99283 EMERGENCY DEPT VISIT LOW MDM: CPT

## 2022-04-18 ASSESSMENT — PAIN SCALES - GENERAL
PAINLEVEL_OUTOF10: 0
PAINLEVEL_OUTOF10: 0

## 2022-04-18 ASSESSMENT — PAIN - FUNCTIONAL ASSESSMENT: PAIN_FUNCTIONAL_ASSESSMENT: 0-10

## 2022-04-18 NOTE — ED NOTES
Right elbow cleansed with Hibiclens and NORMAL SALINE then adaptic and DSD applied to area secured with Eleanora Argelia.      Jose Alberto Lovett RN  04/18/22 8206
dependent (less than 25% patients effort)

## 2022-04-18 NOTE — ED PROVIDER NOTES
[facility]      CHIEF COMPLAINT  Dressing Change (pt. has a skin tear of right elbow on Thursday, seen here Friday and yesterday for dressing change, \"My MD is in [place]\")       HISTORY OF PRESENT ILLNESS  Solange Linder is a 78 y.o. male with history of atrial fibrillation on Coumadin who presents emergency department for evaluation of breath and pain. Patient reports having a skin tear of the right elbow that happened on Thursday. Says he needs dressing changes and came to the ED for a dressing change. Says his dressing keeps falling off. Was seen here on Sunday for a dressing change and came back so he could get it redressed. Says he has a follow-up appointment on Wednesday with his PCP. He lives alone. Denies any new injuries. Denies having any concerns about infections. No fevers or chills. No purulent drainage. No numbness or tingling of his fingers. Denies having any other complaints. No other complaints, modifying factors or associated symptoms. I have reviewed the following from the nursing documentation. Past Medical History:   Diagnosis Date    Atrial fibrillation ([address]     CHF (congestive heart failure) ([address]     Hypertension     Seizures ([address] 2013    X 1 dehydration     Thyroid disease      Past Surgical History:   Procedure Laterality Date    CATARACT REMOVAL WITH IMPLANT Left     COLONOSCOPY      HERNIA REPAIR      3/2021    INTRACAPSULAR CATARACT EXTRACTION Right 5/20/2021    RIGHT EYE Phacoemulsification with intraocular  lens implant performed by Jef Mclean MD at [place] [address] Right     meniscus tear     History reviewed. No pertinent family history.   Social History     Socioeconomic History    Marital status:      Spouse name: Not on file    Number of children: Not on file    Years of education: Not on file    Highest education level: Not on file   Occupational History    Not on file   Tobacco Use    Smoking status: Former Smoker     Types: Cigarettes     Quit date:      Years since quittin.3    Smokeless tobacco: Never Used   Vaping Use    Vaping Use: Never used   Substance and Sexual Activity    Alcohol use: Yes     Comment: martini before dinner     Drug use: Never    Sexual activity: Not Currently   Other Topics Concern    Not on file   Social History Narrative    Not on file     Social Determinants of Health     Financial Resource Strain:     Difficulty of Paying Living Expenses: Not on file   Food Insecurity:     Worried About Running Out of Food in the Last Year: Not on file    Germán of Food in the Last Year: Not on file   Transportation Needs:     Lack of Transportation (Medical): Not on file    Lack of Transportation (Non-Medical): Not on file   Physical Activity:     Days of Exercise per Week: Not on file    Minutes of Exercise per Session: Not on file   Stress:     Feeling of Stress : Not on file   Social Connections:     Frequency of Communication with Friends and Family: Not on file    Frequency of Social Gatherings with Friends and Family: Not on file    Attends Zoroastrian Services: Not on file    Active Member of [address] [place] [address] or Organizations: Not on file    Attends Club or Organization Meetings: Not on file    Marital Status: Not on file   Intimate Partner Violence:     Fear of Current or Ex-Partner: Not on file    Emotionally Abused: Not on file    Physically Abused: Not on file    Sexually Abused: Not on file   Housing Stability:     Unable to Pay for Housing in the Last Year: Not on file    Number of [place] in the Last Year: Not on file    Unstable Housing in the Last Year: Not on file     No current facility-administered medications for this encounter.      Current Outpatient Medications   Medication Sig Dispense Refill    lisinopril (PRINIVIL;ZESTRIL) 5 MG tablet Take 5 mg by mouth daily      warfarin (COUMADIN) 4 MG tablet Take 6 mg by mouth daily Except 8 mg on Mondays or as directed by [facility] Coumadin Service [phone]  Goal INR = 2-3      dofetilide (TIKOSYN) 500 MCG capsule Take 500 mcg by mouth 2 times daily      aspirin 81 MG EC tablet Take 81 mg by mouth daily      finasteride (PROSCAR) 5 MG tablet Take 5 mg by mouth daily      levETIRAcetam (KEPPRA) 500 MG tablet Take 500 mg by mouth 2 times daily      levothyroxine (SYNTHROID) 50 MCG tablet Take 50 mcg by mouth Daily      metoprolol succinate (TOPROL XL) 50 MG extended release tablet Take 100 mg by mouth nightly       terazosin (HYTRIN) 10 MG capsule Take 10 mg by mouth nightly       No Known Allergies    REVIEW OF SYSTEMS  10 systems reviewed, pertinent positives per HPI otherwise noted to be negative. PHYSICAL EXAM  BP (!) 91/54 Comment: right arm  Pulse 72   Temp 98 °F (36.7 °C) (Oral)   Resp 16   Ht 6' 2\" (1.88 m)   Wt 242 lb 15.2 oz (110.2 kg)   SpO2 99%   BMI 31.19 kg/m²    GENERAL APPEARANCE: Awake and alert. No acute distress. HENT: Normocephalic. Atraumatic. HEART/CHEST: RRR. LUNGS: Respirations unlabored. Speaking comfortably in full sentences. ABDOMEN: Soft, non-distended abdomen. Non tender to palpation. No guarding. No rebound. EXTREMITIES: Skin tear on the right elbow, approximately 7 cm in a C-shaped. No surrounding erythema, fluctuance, or drainage. Full ROM at the right elbow. Moving all fingers bilaterally. Sensation intact all extremities. 2+ radial pulses bilaterally  SKIN: Warm and dry. No acute rashes. NEUROLOGICAL: Alert and oriented. No gross facial drooping. Answering questions appropriately. Moving all extremities. PSYCHIATRIC: Pleasant. Normal mood and affect. LABS  No results found for this visit on 04/18/22. I have reviewed all labs for this visit. RADIOLOGY  XR ELBOW RIGHT (MIN 3 VIEWS)    Result Date: 4/15/2022  EXAMINATION: THREE X-RAY VIEWS OF THE RIGHT ELBOW 4/15/2022 10:12 am COMPARISON: None.  HISTORY: ORDERING SYSTEM with strict return precautions. Pt was seen during the [place] 19 pandemic. Appropriate PPE worn by ME during patient encounters. Patient was cared for during a time with constrained hospital bed capacity with nationwide stress on resources and staffing. During the patient's ED course, the patient was given:  Medications - No data to display     CLINICAL IMPRESSION  1. Skin tear of right elbow without complication, subsequent encounter    2. Hypotension, unspecified hypotension type        Blood pressure (!) 91/54, pulse 72, temperature 98 °F (36.7 °C), temperature source Oral, resp. rate 16, height 6' 2\" (1.88 m), weight 242 lb 15.2 oz (110.2 kg), SpO2 99 %. DISPOSITION  Vinicio Cannon was discharged home in stable condition. Patient was given scripts for the following medications. I counseled patient how to take these medications. Discharge Medication List as of 4/18/2022  3:45 PM          Follow-up with:  Bladimir Magdaleno [address]  [phone]    In 2 days        DISCLAIMER: This chart was created using Dragon dictation software. Efforts were made by me to ensure accuracy, however some errors may be present due to limitations of this technology and occasionally words are not transcribed correctly.         Lino Genao MD  04/18/22 1813

## 2022-04-18 NOTE — ED TRIAGE NOTES
pt. has a skin tear of right elbow on Thursday, seen here Friday and yesterday for dressing change, \"My MD is in [place]\"

## 2022-04-20 ENCOUNTER — HOSPITAL ENCOUNTER (OUTPATIENT)
Dept: WOUND CARE | Age: 82
Discharge: HOME | End: 2022-04-20
Payer: MEDICARE

## 2022-04-20 VITALS
HEART RATE: 98 BPM | SYSTOLIC BLOOD PRESSURE: 136 MMHG | HEIGHT: 74 IN | BODY MASS INDEX: 31.46 KG/M2 | WEIGHT: 245.15 LBS | TEMPERATURE: 97.5 F | RESPIRATION RATE: 16 BRPM | DIASTOLIC BLOOD PRESSURE: 77 MMHG

## 2022-04-20 DIAGNOSIS — S51.011A SKIN TEAR OF RIGHT ELBOW WITHOUT COMPLICATION, INITIAL ENCOUNTER: Primary | ICD-10-CM

## 2022-04-20 PROCEDURE — 11045 DBRDMT SUBQ TISS EACH ADDL: CPT

## 2022-04-20 PROCEDURE — 11045 DBRDMT SUBQ TISS EACH ADDL: CPT | Performed by: NURSE PRACTITIONER

## 2022-04-20 PROCEDURE — 99202 OFFICE O/P NEW SF 15 MIN: CPT | Performed by: NURSE PRACTITIONER

## 2022-04-20 PROCEDURE — 11042 DBRDMT SUBQ TIS 1ST 20SQCM/<: CPT | Performed by: NURSE PRACTITIONER

## 2022-04-20 PROCEDURE — 11042 DBRDMT SUBQ TIS 1ST 20SQCM/<: CPT

## 2022-04-20 PROCEDURE — 99213 OFFICE O/P EST LOW 20 MIN: CPT

## 2022-04-20 RX ORDER — LIDOCAINE 40 MG/G
CREAM TOPICAL ONCE
Status: CANCELLED | OUTPATIENT
Start: 2022-04-20 | End: 2022-04-20

## 2022-04-20 RX ORDER — METOPROLOL SUCCINATE 100 MG/1
100 TABLET, EXTENDED RELEASE ORAL DAILY
COMMUNITY

## 2022-04-20 RX ORDER — BETAMETHASONE DIPROPIONATE 0.05 %
OINTMENT (GRAM) TOPICAL ONCE
Status: CANCELLED | OUTPATIENT
Start: 2022-04-20 | End: 2022-04-20

## 2022-04-20 RX ORDER — GENTAMICIN SULFATE 1 MG/G
OINTMENT TOPICAL ONCE
Status: CANCELLED | OUTPATIENT
Start: 2022-04-20 | End: 2022-04-20

## 2022-04-20 RX ORDER — LIDOCAINE HYDROCHLORIDE 20 MG/ML
JELLY TOPICAL ONCE
Status: CANCELLED | OUTPATIENT
Start: 2022-04-20 | End: 2022-04-20

## 2022-04-20 RX ORDER — BACITRACIN ZINC AND POLYMYXIN B SULFATE 500; 1000 [USP'U]/G; [USP'U]/G
OINTMENT TOPICAL ONCE
Status: CANCELLED | OUTPATIENT
Start: 2022-04-20 | End: 2022-04-20

## 2022-04-20 RX ORDER — CLOBETASOL PROPIONATE 0.5 MG/G
OINTMENT TOPICAL ONCE
Status: CANCELLED | OUTPATIENT
Start: 2022-04-20 | End: 2022-04-20

## 2022-04-20 RX ORDER — BACITRACIN, NEOMYCIN, POLYMYXIN B 400; 3.5; 5 [USP'U]/G; MG/G; [USP'U]/G
OINTMENT TOPICAL ONCE
Status: CANCELLED | OUTPATIENT
Start: 2022-04-20 | End: 2022-04-20

## 2022-04-20 RX ORDER — GINSENG 100 MG
CAPSULE ORAL ONCE
Status: CANCELLED | OUTPATIENT
Start: 2022-04-20 | End: 2022-04-20

## 2022-04-20 RX ORDER — LIDOCAINE 50 MG/G
OINTMENT TOPICAL ONCE
Status: CANCELLED | OUTPATIENT
Start: 2022-04-20 | End: 2022-04-20

## 2022-04-20 RX ORDER — LIDOCAINE HYDROCHLORIDE 40 MG/ML
SOLUTION TOPICAL ONCE
Status: COMPLETED | OUTPATIENT
Start: 2022-04-20 | End: 2022-04-20

## 2022-04-20 RX ORDER — LIDOCAINE HYDROCHLORIDE 40 MG/ML
SOLUTION TOPICAL ONCE
Status: CANCELLED | OUTPATIENT
Start: 2022-04-20 | End: 2022-04-20

## 2022-04-20 RX ADMIN — LIDOCAINE HYDROCHLORIDE: 40 SOLUTION TOPICAL at 12:00

## 2022-04-20 ASSESSMENT — PAIN SCALES - GENERAL
PAINLEVEL_OUTOF10: 0
PAINLEVEL_OUTOF10: 0

## 2022-04-20 NOTE — LETTER
[address]  [address] OFFICE [address] [facility] [address]  [phone]  Dept: [phone]        Thank you . Esau Currie for choosing [facility] for your Wound Care needs. We hope you found your first visit both encouraging and educational.  We look forward to serving you throughout the healing process. Before your next visit please review the information you received in your [employer]. The first visit can be overwhelming and this is a useful tool to refresh what information you may have been given. If you find yourself with any questions prior to your upcoming appointment, please feel free to contact us. Often wounds can be challenging and it is our goal to see you through the healing process with as much support possible. Again, thank you for choosing [facility] [place]!     Sincerely,      The Staff of [address] [place] [address] Pkwy and Hyperbaric Oxygen Therapy
preferably within the same week to promote the most effective healing time for your wound(s). 7. If you will be late for an appointment, please call our center to be sure that the provider can still see you when you arrive. If you are more than 15 minutes late your appointment may need to be rescheduled. 8. If two (2) appointments are missed without notifying us, your care plan may be discontinued. The same may happen if multiple visits are cancelled or rescheduled, even with notice. A missed visit is time when another patient, who also needs care, could have been seen. Thank you for your understanding and consideration.

## 2022-04-20 NOTE — PROGRESS NOTES
[address]   Progress Note and Procedure Note      10 [facility] RECORD NUMBER:  [id number]  AGE: 78 y.o. GENDER: male  : 1940  EPISODE DATE:  2022    Subjective:     Chief Complaint   Patient presents with    Wound Check     Initial visit - right elbow wound. States fell 2022 and hit right elbow. Went to ER x 3 - steri strips applied. HISTORY of PRESENT ILLNESS HPI     Leala Moritz is a 78 y.o. male who presents today for wound/ulcer evaluation. Pt lives by himself and is independent,  Alonza Enma on 22 when trying to reach down to put on slippers while standing. Major concern today is how to keep dressing on as it has been slipping off and he cannot redress as he is right handed. History of Wound Context: fall at home without other injuries.    Wound/Ulcer Pain Timing/Severity: intermittent, mild  Quality of pain: tender  Severity:  1 / 10   Modifying Factors: Pain is relieved/improved with rest  Associated Signs/Symptoms: pain and bruising    Ulcer Identification:  Ulcer Type: traumatic    Contributing Factors: edema and anticoagulation therapy    Acute Wound: Laceration and Skin tear    PAST MEDICAL HISTORY        Diagnosis Date    Atrial fibrillation (HCC)     CHF (congestive heart failure) (HCC)     Hypertension     Seizures ([address] 2013    X 1 dehydration     Skin tear of right elbow without complication     Thyroid disease        PAST SURGICAL HISTORY    Past Surgical History:   Procedure Laterality Date    CATARACT REMOVAL WITH IMPLANT Left     COLONOSCOPY      HERNIA REPAIR      3/2021    INTRACAPSULAR CATARACT EXTRACTION Right 2021    RIGHT EYE Phacoemulsification with intraocular  lens implant performed by Emy Lock MD at [facility] 23 Right     meniscus tear       FAMILY HISTORY    Family History   Problem Relation Age of Onset    No Known Problems Mother     No Known Problems Father        SOCIAL HISTORY    Social History     Tobacco Use    Smoking status: Former Smoker     Types: Cigarettes     Quit date:      Years since quittin.3    Smokeless tobacco: Never Used   Vaping Use    Vaping Use: Never used   Substance Use Topics    Alcohol use: Yes     Comment: martini before dinner     Drug use: Never       ALLERGIES    No Known Allergies    MEDICATIONS    Current Outpatient Medications on File Prior to Encounter   Medication Sig Dispense Refill    metoprolol succinate (TOPROL XL) 100 MG extended release tablet Take 100 mg by mouth daily      lisinopril (PRINIVIL;ZESTRIL) 5 MG tablet Take 5 mg by mouth daily      warfarin (COUMADIN) 4 MG tablet Take 6 mg by mouth daily Except 8 mg on  or as directed by [facility] Coumadin Service [phone]  Goal INR = 2-3      dofetilide (TIKOSYN) 500 MCG capsule Take 500 mcg by mouth 2 times daily      aspirin 81 MG EC tablet Take 81 mg by mouth daily      finasteride (PROSCAR) 5 MG tablet Take 5 mg by mouth daily      levETIRAcetam (KEPPRA) 500 MG tablet Take 500 mg by mouth 2 times daily      levothyroxine (SYNTHROID) 50 MCG tablet Take 50 mcg by mouth Daily      terazosin (HYTRIN) 10 MG capsule Take 10 mg by mouth nightly       No current facility-administered medications on file prior to encounter. REVIEW OF SYSTEMS  Review of Systems    Pertinent items are noted in HPI.     Objective:      /77   Pulse 98   Temp 97.5 °F (36.4 °C) (Temporal)   Resp 16   Ht 6' 2\" (1.88 m)   Wt 245 lb 2.4 oz (111.2 kg)   BMI 31.48 kg/m²     Wt Readings from Last 3 Encounters:   22 245 lb 2.4 oz (111.2 kg)   22 242 lb 15.2 oz (110.2 kg)   22 242 lb 1 oz (109.8 kg)       PHYSICAL EXAM  Physical Exam    General Appearance: alert and oriented to person, place and time, well-developed and well-nourished, in no acute distress and appears younger than stated age  Skin: warm and dry and ecchymoses noted on around wound  Head: normocephalic and atraumatic  Eyes: pupils equal, round, and reactive to light  Pulmonary/Chest: clear to auscultation bilaterally- no wheezes, rales or rhonchi, normal air movement, no respiratory distress  Cardiovascular: normal rate, regular rhythm   Wound:  steri strips replaced and debridement of slough in wound bed. Assessment:        Problem List Items Addressed This Visit     Skin tear of right elbow without complication - Primary    Relevant Orders    Initiate Outpatient Wound Care Protocol           Procedure Note  Indications:  Based on my examination of this patient's wound(s)/ulcer(s) today, debridement is required to promote healing and evaluate the wound base. Performed by: MARY Mejía CNP    Consent obtained:  Yes    Time out taken:  Yes    Pain Control: Anesthetic  Anesthetic: 4% Lidocaine Liquid Topical (2.5ml)       Debridement: Excisional Debridement    Using curette and forceps the wound(s)/ulcer(s) was/were debrided down through and including the removal of epidermis, dermis and subcutaneous tissue. Devitalized Tissue Debrided:  fibrin, biofilm and slough    Pre Debridement Measurements:  Are located in the [place]  Documentation Flow Sheet    Diabetic/Pressure/Non Pressure Ulcers only:  Ulcer: Non-Pressure ulcer, fat layer exposed     Wound/Ulcer #: 1    Post Debridement Measurements:  Wound/Ulcer Descriptions are Pre Debridement except measurements:    Wound 04/20/22 Elbow Right #1 ( fall 4/14/2022) (Active)   Wound Image   04/20/22 0819   Wound Etiology Traumatic 04/20/22 0819   Dressing/Treatment Roll gauze;Gauze dressing/dressing sponge; Other (comment);Steri-strips 04/20/22 1201   Wound Length (cm) 6 cm 04/20/22 0819   Wound Width (cm) 4 cm 04/20/22 0819   Wound Depth (cm) 0.1 cm 04/20/22 0819   Wound Surface Area (cm^2) 24 cm^2 04/20/22 0819   Wound Volume (cm^3) 2.4 cm^3 04/20/22 0819   Post-Procedure Length (cm) 6.1 cm 04/20/22 0844 Post-Procedure Width (cm) 4.1 cm 04/20/22 0844   Post-Procedure Depth (cm) 0.2 cm 04/20/22 0844   Post-Procedure Surface Area (cm^2) 25.01 cm^2 04/20/22 0844   Post-Procedure Volume (cm^3) 5.002 cm^3 04/20/22 0844   Wound Assessment Granulation tissue;Slough; Other (Comment) 04/20/22 0819   Drainage Amount Moderate 04/20/22 0819   Drainage Description Serosanguinous 04/20/22 0819   Odor None 04/20/22 0819   Nay-wound Assessment Dry/flaky 04/20/22 0819   Margins Attached edges 04/20/22 0819   Wound Thickness Description not for Pressure Injury Full thickness 04/20/22 0819   Number of days: 0          Total Surface Area Debrided:  25.01 sq cm     Estimated Blood Loss:  Minimal    Hemostasis Achieved:  by pressure    Procedural Pain:  1  / 10     Post Procedural Pain:  0 / 10     Response to treatment:  Well tolerated by patient. Plan:   Pt education per providers related to plan of care and current status of wound. Pt is agreeable to plan of care and will follow-up next week. Treatment Note please see attached Discharge Instructions    Written patient dismissal instructions given to patient and signed by patient or POA. Discharge [address] [place] [address] Physician Orders and Discharge Instructions  [address] [place] [address]  Telephone: [phone]  [address] 8:30 am - 4:30 pm and Friday 8:30 am - 1:00 pm.        NAME:  Lashay Rodirguez  YOB: 1940  MEDICAL RECORD NUMBER:  [id number]  DATE:  4/20/2022       Please wash hands with soap and water prior to and right after  every dressing change           WOUND LOCATION      RIGHT ELBOW     · May rinse wounds with 0.9% saline   · Do NOT SCRUB WOUND. · Keep wounds dry in the shower unless otherwise instructed by the physician.     Topical Treatments:              [x] Apply around the wound:   [x] moisturizing lotion      PRIMARY DRESSING: [x] STERI STRIPS- PLEASE KEEP IN PLACE                       [x] ADAPTIC      SECONDARY DRESSING:               [x] Gauze                                                   [X] Rolled Gauze                                       [x] SPANDAGE    HOW OFTEN TO CHANGE DRESSINGS:           [x] Three times per week:     _____________________________________________________________________________________________    Dietary:  Continue your diet as tolerated. ? Eat heart-healthy foods. These foods include vegetables, fruits, nuts, beans, lean meat, fish, and whole grains. Limit sodium, alcohol, and sugar. ? Protein is a key nutrient in helping to repair damaged tissue and promote new tissue growth. Good sources of protein are milk, yogurt, cheese, fish, lean meat, and beans. ? If you are a diabetic, having diabetes can make it hard for wounds to heal. So try to keep your blood sugar in its target range.  __________________________________________________________________________________________________    ACTIVITY:   Activity as tolerated  ________________________________________________________________________________________________________________    PAIN:    Please note, A small amount of pain, drainage and/or bleeding from this process might be expected, and is NORMAL. Elevate the affected limb. Use over-the-counter medications you would normally use for pain as permitted by your family doctor. For persistent pain not relieved by the above interventions, please call your family doctor.  ________________________________________________________________________________  Call your doctor now or seek immediate medical care if:    · You have symptoms of infection, such as:  ? Increased pain, swelling, warmth, or redness. ? Red streaks leading from the area. ? Pus draining from the area. ? A fever. _______________________________________________________________________    Return Appointment:  ? · ECF or Home Healthcare:  [address]   · Your Home Care Agency is responsible to order your supplies. · Return Appointment: With Fracisco Peters CNP  in  [address] [place] [address])                  [] Orders placed during your visit:           Electronically signed by : Sommer Gillespie on 4/20/2022 at 8:41 AM       [facility] Information: Should you experience any significant changes in your wound(s) or have questions about your wound care, please contact the [facility] at [address] 8:30 am - 4:30 pm and Friday 8:30 am - 1:00 pm.  If you need help with your wound outside these hours and cannot wait until we are again available, contact your PCP or go to the hospital emergency room. PLEASE NOTE: IF YOU ARE UNABLE TO OBTAIN WOUND SUPPLIES, CONTINUE TO USE THE SUPPLIES YOU HAVE AVAILABLE UNTIL YOU ARE ABLE TO REACH US. KEEP THE WOUND COVERED AT ALL TIMES.          Physician Signature:_______________________    Date: ___________ Time:  ____________       [x] Fracisco Peters CNP                Electronically signed by MARY Greenberg CNP on 4/20/2022 at 12:51 PM

## 2022-04-27 ENCOUNTER — HOSPITAL ENCOUNTER (OUTPATIENT)
Dept: WOUND CARE | Age: 82
Discharge: HOME | End: 2022-04-27
Payer: MEDICARE

## 2022-04-27 VITALS
TEMPERATURE: 97.5 F | HEART RATE: 60 BPM | SYSTOLIC BLOOD PRESSURE: 119 MMHG | DIASTOLIC BLOOD PRESSURE: 71 MMHG | RESPIRATION RATE: 16 BRPM

## 2022-04-27 DIAGNOSIS — S51.011A SKIN TEAR OF RIGHT ELBOW WITHOUT COMPLICATION, INITIAL ENCOUNTER: ICD-10-CM

## 2022-04-27 DIAGNOSIS — S51.011D SKIN TEAR OF RIGHT ELBOW WITHOUT COMPLICATION, SUBSEQUENT ENCOUNTER: Primary | ICD-10-CM

## 2022-04-27 PROCEDURE — 99212 OFFICE O/P EST SF 10 MIN: CPT

## 2022-04-27 PROCEDURE — 99212 OFFICE O/P EST SF 10 MIN: CPT | Performed by: NURSE PRACTITIONER

## 2022-04-27 RX ORDER — GINSENG 100 MG
CAPSULE ORAL ONCE
Status: CANCELLED | OUTPATIENT
Start: 2022-04-27 | End: 2022-04-27

## 2022-04-27 RX ORDER — BETAMETHASONE DIPROPIONATE 0.05 %
OINTMENT (GRAM) TOPICAL ONCE
Status: CANCELLED | OUTPATIENT
Start: 2022-04-27 | End: 2022-04-27

## 2022-04-27 RX ORDER — LIDOCAINE HYDROCHLORIDE 40 MG/ML
SOLUTION TOPICAL ONCE
Status: CANCELLED | OUTPATIENT
Start: 2022-04-27 | End: 2022-04-27

## 2022-04-27 RX ORDER — LIDOCAINE HYDROCHLORIDE 20 MG/ML
JELLY TOPICAL ONCE
Status: CANCELLED | OUTPATIENT
Start: 2022-04-27 | End: 2022-04-27

## 2022-04-27 RX ORDER — GENTAMICIN SULFATE 1 MG/G
OINTMENT TOPICAL ONCE
Status: CANCELLED | OUTPATIENT
Start: 2022-04-27 | End: 2022-04-27

## 2022-04-27 RX ORDER — BACITRACIN, NEOMYCIN, POLYMYXIN B 400; 3.5; 5 [USP'U]/G; MG/G; [USP'U]/G
OINTMENT TOPICAL ONCE
Status: CANCELLED | OUTPATIENT
Start: 2022-04-27 | End: 2022-04-27

## 2022-04-27 RX ORDER — CLOBETASOL PROPIONATE 0.5 MG/G
OINTMENT TOPICAL ONCE
Status: CANCELLED | OUTPATIENT
Start: 2022-04-27 | End: 2022-04-27

## 2022-04-27 RX ORDER — BACITRACIN ZINC AND POLYMYXIN B SULFATE 500; 1000 [USP'U]/G; [USP'U]/G
OINTMENT TOPICAL ONCE
Status: CANCELLED | OUTPATIENT
Start: 2022-04-27 | End: 2022-04-27

## 2022-04-27 RX ORDER — LIDOCAINE 40 MG/G
CREAM TOPICAL ONCE
Status: CANCELLED | OUTPATIENT
Start: 2022-04-27 | End: 2022-04-27

## 2022-04-27 RX ORDER — LIDOCAINE 50 MG/G
OINTMENT TOPICAL ONCE
Status: CANCELLED | OUTPATIENT
Start: 2022-04-27 | End: 2022-04-27

## 2022-04-27 ASSESSMENT — PAIN SCALES - GENERAL: PAINLEVEL_OUTOF10: 0

## 2022-04-27 NOTE — PROGRESS NOTES
[address]   Progress Note and Procedure Note      10 [facility] RECORD NUMBER:  [id number]  AGE: 78 y.o. GENDER: male  : 1940  EPISODE DATE:  2022    Subjective:     Chief Complaint   Patient presents with    Wound Check     Follow Up on Right Arm Wound         HISTORY of PRESENT ILLNESS HPI   Kobe Porras is a 78 y.o. male who presents today for wound/ulcer evaluation. Pt lives by himself and is independent,  Magalene [place] on 22 when trying to reach down to put on slippers while standing. No problem with wound dressing slipping down, home care nurses out twice a week. He states no pain or drainage this past week. Reports good appetite  History of Wound Context: fall at home without other injuries.    Wound/Ulcer Pain Timing/Severity: intermittent, mild  Quality of pain: tender  Severity:  1 / 10   Modifying Factors: Pain is relieved/improved with rest  Associated Signs/Symptoms: pain and bruising     Ulcer Identification:  Ulcer Type: traumatic     Contributing Factors: edema and anticoagulation therapy     Acute Wound: Laceration and Skin tear  PAST MEDICAL HISTORY        Diagnosis Date    Atrial fibrillation (HCC)     CHF (congestive heart failure) (HCC)     Hypertension     Se[address] [address] 2013    X 1 dehydration     Skin tear of right elbow without complication 6114    Thyroid disease        PAST SURGICAL HISTORY    Past Surgical History:   Procedure Laterality Date    CATARACT REMOVAL WITH IMPLANT Left     COLONOSCOPY      HERNIA REPAIR      3/2021    INTRACAPSULAR CATARACT EXTRACTION Right 2021    RIGHT EYE Phacoemulsification with intraocular  lens implant performed by Sebas Goyal MD at [facility] 23 Right     meniscus tear       FAMILY HISTORY    Family History   Problem Relation Age of Onset    No Known Problems Mother     No Known Problems Father        SOCIAL HISTORY    Social History     Tobacco Use  Smoking status: Former Smoker     Types: Cigarettes     Quit date:      Years since quittin.3    Smokeless tobacco: Never Used   Vaping Use    Vaping Use: Never used   Substance Use Topics    Alcohol use: Yes     Comment: martini before dinner     Drug use: Never       ALLERGIES    No Known Allergies    MEDICATIONS    Current Outpatient Medications on File Prior to Encounter   Medication Sig Dispense Refill    metoprolol succinate (TOPROL XL) 100 MG extended release tablet Take 100 mg by mouth daily      lisinopril (PRINIVIL;ZESTRIL) 5 MG tablet Take 5 mg by mouth daily      warfarin (COUMADIN) 4 MG tablet Take 6 mg by mouth daily Except 8 mg on  or as directed by [facility] Coumadin Service [phone]  Goal INR = 2-3      dofetilide (TIKOSYN) 500 MCG capsule Take 500 mcg by mouth 2 times daily      aspirin 81 MG EC tablet Take 81 mg by mouth daily      finasteride (PROSCAR) 5 MG tablet Take 5 mg by mouth daily      levETIRAcetam (KEPPRA) 500 MG tablet Take 500 mg by mouth 2 times daily      levothyroxine (SYNTHROID) 50 MCG tablet Take 50 mcg by mouth Daily      terazosin (HYTRIN) 10 MG capsule Take 10 mg by mouth nightly       No current facility-administered medications on file prior to encounter. REVIEW OF SYSTEMS  Review of Systems    Pertinent items are noted in HPI.     Objective:      /71   Pulse 60   Temp 97.5 °F (36.4 °C) (Temporal)   Resp 16     Wt Readings from Last 3 Encounters:   22 245 lb 2.4 oz (111.2 kg)   22 242 lb 15.2 oz (110.2 kg)   22 242 lb 1 oz (109.8 kg)       PHYSICAL EXAM  Physical Exam    General Appearance: alert and oriented to person, place and time, well-developed and well-nourished, in no acute distress  Skin: warm and dry, no rash or erythema  Head: normocephalic and atraumatic  Eyes: pupils equal, round, and reactive to light  Pulmonary/Chest:  normal air movement, no respiratory distress  Cardiovascular: normal rate and regular rhythm  Wound:  Wound edges well approximated, will leave steri strips in place with adaptic every other day. No overt signs of infection. Assessment:        Problem List Items Addressed This Visit     Skin tear of right elbow without complication - Primary           Procedure Note  Indications:  Based on my examination of this patient's wound(s)/ulcer(s) today, debridement is not required to promote healing and evaluate the wound base. Wound/Ulcer Descriptions are Pre Debridement except measurements:    Wound 04/20/22 Elbow Right #1 ( fall 4/14/2022) (Active)   Wound Image   04/20/22 0819   Wound Etiology Traumatic 04/20/22 0819   Dressing Status Old drainage noted 04/27/22 0858   Dressing/Treatment Roll gauze;Gauze dressing/dressing sponge; Other (comment);Steri-strips 04/20/22 1201   Wound Length (cm) 5.5 cm 04/27/22 0858   Wound Width (cm) 4.3 cm 04/27/22 0858   Wound Depth (cm) 0.1 cm 04/27/22 0858   Wound Surface Area (cm^2) 23.65 cm^2 04/27/22 0858   Change in Wound Size % (l*w) 1.46 04/27/22 0858   Wound Volume (cm^3) 2.365 cm^3 04/27/22 0858   Wound Healing % 1 04/27/22 0858   Post-Procedure Length (cm) 5.5 cm 04/27/22 0908   Post-Procedure Width (cm) 4.3 cm 04/27/22 0908   Post-Procedure Depth (cm) 0.1 cm 04/27/22 0908   Post-Procedure Surface Area (cm^2) 23.65 cm^2 04/27/22 0908   Post-Procedure Volume (cm^3) 2.365 cm^3 04/27/22 0908   Wound Assessment Eschar dry 04/27/22 0858   Drainage Amount None 04/27/22 0858   Drainage Description Serosanguinous 04/20/22 0819   Odor None 04/27/22 0858   Nay-wound Assessment Dry/flaky 04/27/22 0858   Margins Undefined edges 04/27/22 0858   Wound Thickness Description not for Pressure Injury Full thickness 04/27/22 0858   Number of days: 7              Plan:   Pt education per provider related to plan of care and he is agreeable to continue same plan of care. Questions answered.  Treatment Note please see attached Discharge Instructions    Written patient dismissal instructions given to patient and signed by patient or POA. Discharge Instructions         Discharge [facility] Wound Care Physician Orders and Discharge Instructions  [address], [address] [address]  Telephone: [phone]  [address] 8:30 am - 4:30 pm and Friday 8:30 am - 1:00 pm.          NAME:  Ophelia Garcia  YOB: 1940  MEDICAL RECORD NUMBER:  [id number]  DATE:  4/27/2022         Please wash hands with soap and water prior to and right after  every dressing change               WOUND LOCATION      RIGHT ELBOW      · May rinse wounds with 0.9% saline   · Do NOT SCRUB WOUND. · Keep wounds dry in the shower unless otherwise instructed by the physician.     Topical Treatments:              [x]?? Apply around the wound:   [x]? ? moisturizing lotion       PRIMARY DRESSING:                            [x]? STERI STRIPS- PLEASE KEEP IN PLACE                       [x]? ADAPTIC        SECONDARY DRESSING:                [x]? Gauze                                                                       [X] Rolled Gauze                                       [x]? SPANDAGE     HOW OFTEN TO CHANGE DRESSINGS:                      [x]? Three times per week:      _____________________________________________________________________________________________     Dietary:  Continue your diet as tolerated. ? Eat heart-healthy foods. These foods include vegetables, fruits, nuts, beans, lean meat, fish, and whole grains. Limit sodium, alcohol, and sugar. ? Protein is a key nutrient in helping to repair damaged tissue and promote new tissue growth. Good sources of protein are milk, yogurt, cheese, fish, lean meat, and beans.   ? If you are a diabetic, having diabetes can make it hard for wounds to heal. So try to keep your blood sugar in its target range.  __________________________________________________________________________________________________     ACTIVITY:   Activity as tolerated  ________________________________________________________________________________________________________________     PAIN:     Please note, A small amount of pain, drainage and/or bleeding from this process might be expected, and is NORMAL.      Elevate the affected limb. Use over-the-counter medications you would normally use for pain as permitted by your family doctor. For persistent pain not relieved by the above interventions, please call your family doctor.  ________________________________________________________________________________  Call your doctor now or seek immediate medical care if:    · You have symptoms of infection, such as:  ? Increased pain, swelling, warmth, or redness. ? Red streaks leading from the area. ? Pus draining from the area. ? A fever.      _______________________________________________________________________     Return Appointment:  ?       · ECF or Home Healthcare:  [address] [facility] is responsible to order your supplies.      · Return Appointment: With Rossi Henry CNP  in  1  Week(s)                   []? ? Orders placed during your visit:           Electronically signed by : Marleen Catalan on 4/27/2022 at [address] [facility] Information: Should you experience any significant changes in your wound(s) or have questions about your wound care, please contact the [address] [place] [address] at [address] 8:30 am - 4:30 pm and Friday 8:30 am - 1:00 pm.  If you need help with your wound outside these hours and cannot wait until we are again available, contact your PCP or go to the hospital emergency room.      PLEASE NOTE: IF YOU ARE UNABLE TO OBTAIN WOUND SUPPLIES, CONTINUE TO USE THE SUPPLIES YOU HAVE AVAILABLE UNTIL YOU ARE ABLE TO [facility].  KEEP THE WOUND COVERED AT ALL TIMES.           Physician Signature:_______________________     Date: ___________ Time:  ____________        [x]?  Hans Hodgson CNP            Electronically signed by MARY Lu CNP on 4/27/2022 at 9:45 AM

## 2022-05-04 ENCOUNTER — HOSPITAL ENCOUNTER (OUTPATIENT)
Dept: WOUND CARE | Age: 82
Discharge: HOME | End: 2022-05-04
Payer: MEDICARE

## 2022-05-04 VITALS
SYSTOLIC BLOOD PRESSURE: 124 MMHG | HEART RATE: 62 BPM | TEMPERATURE: 97 F | RESPIRATION RATE: 20 BRPM | DIASTOLIC BLOOD PRESSURE: 72 MMHG

## 2022-05-04 DIAGNOSIS — S51.011A SKIN TEAR OF RIGHT ELBOW WITHOUT COMPLICATION, INITIAL ENCOUNTER: Primary | ICD-10-CM

## 2022-05-04 PROCEDURE — 99212 OFFICE O/P EST SF 10 MIN: CPT

## 2022-05-04 PROCEDURE — 99212 OFFICE O/P EST SF 10 MIN: CPT | Performed by: NURSE PRACTITIONER

## 2022-05-04 RX ORDER — LIDOCAINE HYDROCHLORIDE 40 MG/ML
SOLUTION TOPICAL ONCE
Status: CANCELLED | OUTPATIENT
Start: 2022-05-04 | End: 2022-05-04

## 2022-05-04 RX ORDER — LIDOCAINE 40 MG/G
CREAM TOPICAL ONCE
Status: CANCELLED | OUTPATIENT
Start: 2022-05-04 | End: 2022-05-04

## 2022-05-04 RX ORDER — BACITRACIN ZINC AND POLYMYXIN B SULFATE 500; 1000 [USP'U]/G; [USP'U]/G
OINTMENT TOPICAL ONCE
Status: CANCELLED | OUTPATIENT
Start: 2022-05-04 | End: 2022-05-04

## 2022-05-04 RX ORDER — BACITRACIN, NEOMYCIN, POLYMYXIN B 400; 3.5; 5 [USP'U]/G; MG/G; [USP'U]/G
OINTMENT TOPICAL ONCE
Status: CANCELLED | OUTPATIENT
Start: 2022-05-04 | End: 2022-05-04

## 2022-05-04 RX ORDER — LIDOCAINE 50 MG/G
OINTMENT TOPICAL ONCE
Status: CANCELLED | OUTPATIENT
Start: 2022-05-04 | End: 2022-05-04

## 2022-05-04 RX ORDER — LIDOCAINE HYDROCHLORIDE 20 MG/ML
JELLY TOPICAL ONCE
Status: CANCELLED | OUTPATIENT
Start: 2022-05-04 | End: 2022-05-04

## 2022-05-04 RX ORDER — BETAMETHASONE DIPROPIONATE 0.05 %
OINTMENT (GRAM) TOPICAL ONCE
Status: CANCELLED | OUTPATIENT
Start: 2022-05-04 | End: 2022-05-04

## 2022-05-04 RX ORDER — GENTAMICIN SULFATE 1 MG/G
OINTMENT TOPICAL ONCE
Status: CANCELLED | OUTPATIENT
Start: 2022-05-04 | End: 2022-05-04

## 2022-05-04 RX ORDER — CLOBETASOL PROPIONATE 0.5 MG/G
OINTMENT TOPICAL ONCE
Status: CANCELLED | OUTPATIENT
Start: 2022-05-04 | End: 2022-05-04

## 2022-05-04 RX ORDER — GINSENG 100 MG
CAPSULE ORAL ONCE
Status: CANCELLED | OUTPATIENT
Start: 2022-05-04 | End: 2022-05-04

## 2022-05-04 ASSESSMENT — PAIN SCALES - GENERAL
PAINLEVEL_OUTOF10: 0
PAINLEVEL_OUTOF10: 0

## 2022-05-04 NOTE — PROGRESS NOTES
[address]   Progress Note and Procedure Note      10 [facility] RECORD NUMBER:  [id number]  AGE: 78 y.o. GENDER: male  : 1940  EPISODE DATE:  2022    Subjective:     Chief Complaint   Patient presents with    Wound Check     f/u visit - right elbow wound         HISTORY of PRESENT ILLNESS HPI   Jovan Awad is a 78 y. o. male who presents today for wound/ulcer evaluation. Wound closed today. Pt lives by himself and is independent,  Fell on 22 when trying to reach down to put on slippers while standing. No problem with wound dressing slipping down, home care nurses out twice a week.   He states no pain or drainage this past week.   Reports good appetite  History of Wound Context: fall at home without other injuries.   Wound/Ulcer Pain Timing/Severity: intermittent, mild  Quality of pain: tender  Severity:   / 10   Modifying Factors: Pain is relieved/improved with rest  Associated Signs/Symptoms: pain and bruising     Ulcer Identification:  Ulcer Type: traumatic     Contributing Factors: edema and anticoagulation therapy     Acute Wound: Laceration and Skin tear  PAST MEDICAL HISTORY        Diagnosis Date    Atrial fibrillation (HCC)     CHF (congestive heart failure) (HCC)     Hypertension     Seizures ([facility] [address] 2013    X 1 dehydration     Skin tear of right elbow without complication     Thyroid disease        PAST SURGICAL HISTORY    Past Surgical History:   Procedure Laterality Date    CATARACT REMOVAL WITH IMPLANT Left     COLONOSCOPY      HERNIA REPAIR      3/2021    INTRACAPSULAR CATARACT EXTRACTION Right 2021    RIGHT EYE Phacoemulsification with intraocular  lens implant performed by Ya Stevens MD at [facility] Right     meniscus tear       FAMILY HISTORY    Family History   Problem Relation Age of Onset    No Known Problems Mother     No Known Problems Father        SOCIAL HISTORY    Social History     Tobacco Use    Smoking status: Former Smoker     Types: Cigarettes     Quit date:      Years since quittin.3    Smokeless tobacco: Never Used   Vaping Use    Vaping Use: Never used   Substance Use Topics    Alcohol use: Yes     Comment: martini before dinner     Drug use: Never       ALLERGIES    No Known Allergies    MEDICATIONS    Current Outpatient Medications on File Prior to Encounter   Medication Sig Dispense Refill    metoprolol succinate (TOPROL XL) 100 MG extended release tablet Take 100 mg by mouth daily      lisinopril (PRINIVIL;ZESTRIL) 5 MG tablet Take 5 mg by mouth daily      warfarin (COUMADIN) 4 MG tablet Take 6 mg by mouth daily Except 8 mg on  or as directed by [facility] Coumadin Service [phone]  Goal INR = 2-3      dofetilide (TIKOSYN) 500 MCG capsule Take 500 mcg by mouth 2 times daily      aspirin 81 MG EC tablet Take 81 mg by mouth daily      finasteride (PROSCAR) 5 MG tablet Take 5 mg by mouth daily      levETIRAcetam (KEPPRA) 500 MG tablet Take 500 mg by mouth 2 times daily      levothyroxine (SYNTHROID) 50 MCG tablet Take 50 mcg by mouth Daily      terazosin (HYTRIN) 10 MG capsule Take 10 mg by mouth nightly       No current facility-administered medications on file prior to encounter. REVIEW OF SYSTEMS  Review of Systems    Pertinent items are noted in HPI.     Objective:      /72   Pulse 62   Temp 97 °F (36.1 °C) (Temporal)   Resp 20     Wt Readings from Last 3 Encounters:   22 245 lb 2.4 oz (111.2 kg)   22 242 lb 15.2 oz (110.2 kg)   22 242 lb 1 oz (109.8 kg)       PHYSICAL EXAM  Physical Exam    General Appearance: alert and oriented to person, place and time, well-developed and well-nourished, in no acute distress and pale  Skin: warm and dry, no rash or erythema  Head: normocephalic and atraumatic  Eyes: pupils equal, round, and reactive to light  Pulmonary/Chest:  normal air movement, no respiratory distress  Cardiovascular: normal rate and regular rhythm  Wound:  Wound edges now well approximated, steri strips removed and only cover dressing now. Assessment:        Problem List Items Addressed This Visit     Skin tear of right elbow without complication - Primary    Relevant Orders    Initiate Outpatient Wound Care Protocol           Procedure Note  Indications:  Based on my examination of this patient's wound(s)/ulcer(s) today, debridement is not required to promote healing and evaluate the wound base. Wound/Ulcer Descriptions are Pre Debridement except measurements:    Wound 04/20/22 Elbow Right #1 ( fall 4/14/2022) (Active)   Wound Image   05/04/22 0905   Wound Etiology Traumatic 04/20/22 0819   Dressing Status Old drainage noted 04/27/22 0858   Dressing/Treatment Silicone border 06/55/68 0905   Wound Length (cm) 0 cm 05/04/22 0846   Wound Width (cm) 0 cm 05/04/22 0846   Wound Depth (cm) 0 cm 05/04/22 0846   Wound Surface Area (cm^2) 0 cm^2 05/04/22 0846   Change in Wound Size % (l*w) 100 05/04/22 0846   Wound Volume (cm^3) 0 cm^3 05/04/22 0846   Wound Healing % 100 05/04/22 0846   Post-Procedure Length (cm) 0 cm 05/04/22 0905   Post-Procedure Width (cm) 0 cm 05/04/22 0905   Post-Procedure Depth (cm) 0 cm 05/04/22 0905   Post-Procedure Surface Area (cm^2) 0 cm^2 05/04/22 0905   Post-Procedure Volume (cm^3) 0 cm^3 05/04/22 0905   Wound Assessment Epithelialization 05/04/22 0846   Drainage Amount None 04/27/22 0858   Drainage Description Serosanguinous 04/20/22 0819   Odor None 04/27/22 0858   Nay-wound Assessment Dry/flaky 04/27/22 0858   Margins Undefined edges 04/27/22 0858   Wound Thickness Description not for Pressure Injury Full thickness 04/27/22 0858   Number of days: 14          Plan:   Pt education per provider related to plan of care and follow-up treatment of newly closed wound. Questions answered.    Treatment Note please see attached Discharge Instructions    Written patient dismissal instructions given to patient and signed by patient or POA. Discharge Instructions         [address] Physician Orders   [facility] AT [address], [place] [address]  Telephone: [phone]    NAME:  Noelle Adkins  YOB: 1940  MEDICAL RECORD NUMBER:  [id number]  DATE:  5/4/2022    Congratulations! You have completed your treatment. Return to your Primary Care Physician for all your health issues. Resume your ordinary activities as tolerated. Take your medications as prescribed by your primary care physician. Check your skin daily for cracks, bruises, sores, or dryness. Use a moisturizer as needed. Clean and dry your skin, using mild soap and warm water (not hot). Avoid alcohol and caffeine and do not smoke. Maintain a nutritious diet. Avoid pressure on your wound site. Keep your legs elevated above the level of the heart whenever possible.   Other: place mepilex border - remove friday with [address]- may shower on friday- may leave area open to air if no longer bleeding or may place a small bandaid   Place spandagrip to right elbow     Physician Signature:______________________    Date: ___________ Time:  ____________    Cookie Flavors CNP            Electronically signed by Joann Cook RN on 5/4/2022 at 9:08 AM                            Electronically signed by MARY Ramirez CNP on 5/4/2022 at 10:10 AM

## 2022-06-03 ENCOUNTER — ANTI-COAG VISIT (OUTPATIENT)
Dept: PHARMACY | Age: 82
End: 2022-06-03
Payer: MEDICARE

## 2022-06-03 DIAGNOSIS — I48.91 ATRIAL FIBRILLATION, UNSPECIFIED TYPE (HCC): Primary | ICD-10-CM

## 2022-06-03 LAB — INTERNATIONAL NORMALIZATION RATIO, POC: 2.1

## 2022-06-03 PROCEDURE — 85610 PROTHROMBIN TIME: CPT

## 2022-06-03 PROCEDURE — 99211 OFF/OP EST MAY X REQ PHY/QHP: CPT

## 2022-06-03 NOTE — PROGRESS NOTES
Leala Moritz is a 78 y.o. here for warfarin management. Candice Kuhn had an INR test today. Results were reviewed and appropriate warfarin management was completed. This visit was performed as: An in person visit. Protocols were followed with precautions to reduce the spread of COVID-19. Patient verifies current dosing regimen: Yes     Warfarin medication reviewed and updated on the patient 's home medication list: Yes   All other medications reviewed and updated on the patient 's home medication list: No new medications     Lab Results   Component Value Date    INR 2.1 2022    INR 2.7 04/15/2022    INR 2.7 2022       Patient Findings     Negatives:  Signs/symptoms of thrombosis, Signs/symptoms of bleeding, Change in health, Missed doses, Change in medications, Change in diet/appetite, Bruising          Anticoagulation Summary  As of 6/3/2022    INR goal:  2.0-3.0   TTR:  79.7 % (2.2 y)   INR used for dosin.1 (6/3/2022)   Warfarin maintenance plan:  8 mg (4 mg x 2) every Mon; 6 mg (4 mg x 1.5) all other days   Weekly warfarin total:  44 mg   Plan last modified:  Elvie Valdovinos RPH (3/19/2021)   Next INR check:  7/15/2022   Priority:  Maintenance   Target end date: Indefinite    Indications    Afib ([address] [I48.91]             Anticoagulation Episode Summary     INR check location:  Anticoagulation Clinic    Preferred lab:      Send INR reminders to:  [facility] CLINICAL STAFF    Comments:  FAX Dr. Michelle Loredo. Did[employer]dy  [facility] [employer]  Q 6 weeks      Anticoagulation Care Providers     Provider Role Specialty Phone number    Izabel Jacinto MD Referring Cardiology [phone]          Warfarin assessment / plan:   Patient appears well. No complaints regarding warfarin therapy. No change to weekly warfarin dosing.     Description    CONTINUE: Warfarin 6mg daily except 8 mg on     Call with medications changes, especially antibiotics and steroids and including any over-the-counter medications or herbal products. Call if you stop any medications. Keep the number of servings of Vitamin K (dark green vegetables) consistent from week to week  He eats a large salad with several vegetables every evening. Broccoli once per week. Spinach once per month. 2 Vodka martinis every evening         Immunization History   Administered Date(s) Administered    COVID-19, Kiara Sepulveda, Primary or Immunocompromised, PF, 100mcg/0.5mL 01/24/2021, 02/21/2021, 11/28/2021           Orders Placed This Encounter   Procedures    POCT INR     This external order was created through the results console. No orders of the defined types were placed in this encounter. Reviewed AVS with patient / caregiver.     Billing Points:  0 billing points this visit       CLINICAL PHARMACY CONSULT: MED RECONCILIATION/REVIEW ADDENDUM    For Pharmacy Admin Tracking Only     Intervention Detail:    Total # of Interventions Recommended: 0   Total # of Interventions Accepted: 0   Time Spent (min): 15

## 2022-07-15 ENCOUNTER — ANTI-COAG VISIT (OUTPATIENT)
Dept: PHARMACY | Age: 82
End: 2022-07-15
Payer: MEDICARE

## 2022-07-15 DIAGNOSIS — I48.91 ATRIAL FIBRILLATION, UNSPECIFIED TYPE (HCC): Primary | ICD-10-CM

## 2022-07-15 LAB — INTERNATIONAL NORMALIZATION RATIO, POC: 3.4

## 2022-07-15 PROCEDURE — 85610 PROTHROMBIN TIME: CPT

## 2022-07-15 PROCEDURE — 99211 OFF/OP EST MAY X REQ PHY/QHP: CPT

## 2022-07-15 NOTE — PROGRESS NOTES
Loida Lam is a 78 y.o. here for warfarin management. Ochoa Hathaway had an INR test today. Results were reviewed and appropriate warfarin management was completed. This visit was performed as: An in person visit. Protocols were followed with precautions to reduce the spread of COVID-19. Patient verifies current dosing regimen: Yes     Warfarin medication reviewed and updated on the patient 's home medication list: Yes   All other medications reviewed and updated on the patient 's home medication list: No new medications     Lab Results   Component Value Date    INR 3.4 07/15/2022    INR 2.1 06/03/2022    INR 2.7 04/15/2022       Patient Findings       Negatives:  Signs/symptoms of thrombosis, Signs/symptoms of bleeding, Change in health, Missed doses, Change in medications, Change in diet/appetite, Bruising    Comments: Will begin taking warfarin at dinnertime            Anticoagulation Summary  As of 7/15/2022      INR goal:  2.0-3.0   TTR:  79.2 % (2.3 y)   INR used for dosing:  3.4 (7/15/2022)   Warfarin maintenance plan:  8 mg (4 mg x 2) every Mon; 6 mg (4 mg x 1.5) all other days   Weekly warfarin total:  44 mg   Plan last modified:  Kyle Sanabria RPH (3/19/2021)   Next INR check:  8/5/2022   Priority:  Maintenance   Target end date: Indefinite    Indications    Afib ([facility] [address] [I48.91]                 Anticoagulation Episode Summary       INR check location:  Anticoagulation Clinic    Preferred lab:      Send INR reminders to:  [facility] MANAGEMENT CLINICAL STAFF    Comments:  FAX Dr. Serina Fairchild. Batsheva Turner Q 6 weeks          Anticoagulation Care Providers       Provider Role Specialty Phone number    Mae Garcia MD Referring Cardiology [phone]            Warfarin assessment / plan:   Patient appears well. No complaints regarding warfarin therapy. Ochoa Hathaway had a large spinach casserole yesterday and decided to take an 8 mg dose this morning. Instructed him not to increase his dose on his own.   He will begin taking his warfarin at dinnertime. No change to weekly warfarin dosing. Description    Hold warfarin on 7-16-22  CONTINUE: Warfarin 6mg daily except 8 mg on Mondays    Call with medications changes, especially antibiotics and steroids and including any over-the-counter medications or herbal products. Call if you stop any medications. Keep the number of servings of Vitamin K (dark green vegetables) consistent from week to week  He eats a large salad with several vegetables every evening. Broccoli once per week. Spinach once per month. 2 Vodka martinis every evening         Immunization History   Administered Date(s) Administered    COVID-19, MODERNA BLUE border, Primary or Immunocompromised, (age 12y+), IM, 100 mcg/0.5mL 01/24/2021, 02/21/2021, 11/28/2021           Orders Placed This Encounter   Procedures    POCT INR     This external order was created through the results console. No orders of the defined types were placed in this encounter. Reviewed AVS with patient / caregiver.     Billing Points:  Adjust dosage and/or reconcile meds (fill pill box) </= 5 medications - 2 points       CLINICAL PHARMACY CONSULT: MED RECONCILIATION/REVIEW ADDENDUM    For Pharmacy Admin Tracking Only    Intervention Detail: Dose Adjustment: 1, reason: Therapy Optimization  Total # of Interventions Recommended: 1  Total # of Interventions Accepted: 1  Time Spent (min): 15

## 2022-08-05 ENCOUNTER — ANTI-COAG VISIT (OUTPATIENT)
Dept: PHARMACY | Age: 82
End: 2022-08-05
Payer: MEDICARE

## 2022-08-05 DIAGNOSIS — I48.91 ATRIAL FIBRILLATION, UNSPECIFIED TYPE (HCC): Primary | ICD-10-CM

## 2022-08-05 LAB — INTERNATIONAL NORMALIZATION RATIO, POC: 3.4

## 2022-08-05 PROCEDURE — 99211 OFF/OP EST MAY X REQ PHY/QHP: CPT

## 2022-08-05 PROCEDURE — 85610 PROTHROMBIN TIME: CPT

## 2022-08-05 NOTE — PROGRESS NOTES
No complaints regarding warfarin therapy. No bruising or bleeding reported. Has not been having weekly broccoli due to store's availability. Will get some frozen broccoli today. No change to weekly warfarin dosing. Description    Hold warfarin on 8-5-22  CONTINUE: Warfarin 6mg daily except 8 mg on Mondays    Call with medications changes, especially antibiotics and steroids and including any over-the-counter medications or herbal products. Call if you stop any medications. Keep the number of servings of Vitamin K (dark green vegetables) consistent from week to week  He eats a large salad with several vegetables every evening. Broccoli once per week. Spinach once per month. Eats a airam lettuce salad daily. 2 Vodka martinis every evening         Immunization History   Administered Date(s) Administered    COVID-19, MODERNA BLUE border, Primary or Immunocompromised, (age 12y+), IM, 100 mcg/0.5mL 01/24/2021, 02/21/2021, 11/28/2021           Orders Placed This Encounter   Procedures    POCT INR     This external order was created through the results console. No orders of the defined types were placed in this encounter. Reviewed AVS with patient / caregiver.   Gave a copy of the Warfarin Guide    Billing Points:  Adjust dosage and/or reconcile meds (fill pill box) </= 5 medications - 2 points       CLINICAL PHARMACY CONSULT: MED RECONCILIATION/REVIEW ADDENDUM    For Pharmacy Admin Tracking Only    Intervention Detail: Dose Adjustment: 2, reason: Therapy Optimization  Total # of Interventions Recommended: 2  Total # of Interventions Accepted: 2  Time Spent (min): 15

## 2022-08-15 NOTE — PROGRESS NOTES
Giovanni Caceres is a 78 y.o. here for warfarin management. Jose Boggs had an INR test today. Results were reviewed and appropriate warfarin management was completed. This visit was performed as: An in person visit. Protocols were followed with precautions to reduce the spread of COVID-19. Patient verifies current dosing regimen: Yes     Warfarin medication reviewed and updated on the patient 's home medication list: Yes   All other medications reviewed and updated on the patient 's home medication list: No new medications     Lab Results   Component Value Date    INR 2.6 2022    INR 2.1 2021    INR 2.1 2021       Patient Findings     Negatives:  Signs/symptoms of thrombosis, Signs/symptoms of bleeding, Change in health, Missed doses, Change in medications, Change in diet/appetite, Bruising          Anticoagulation Summary  As of 2022    INR goal:  2.0-3.0   TTR:  76.4 % (1.8 y)   INR used for dosin.6 (2022)   Warfarin maintenance plan:  8 mg (4 mg x 2) every Mon; 6 mg (4 mg x 1.5) all other days   Weekly warfarin total:  44 mg   Plan last modified:  Sienna Huber RPH (3/19/2021)   Next INR check:  3/25/2022   Priority:  Maintenance   Target end date: Indefinite    Indications    Afib ([facility] [address] [I48.91]             Anticoagulation Episode Summary     INR check location:  Anticoagulation Clinic    Preferred lab:      Send INR reminders to:  [facility] MANAGEMENT CLINICAL STAFF    Comments:  FAX Dr. Fatimah Bell. Bandar Sparks Q 6 weeks      Anticoagulation Care Providers     Provider Role Specialty Phone number    Guillermina Jones MD Referring Cardiology [phone]          Warfarin assessment / plan:   Patient appears well. No complaints regarding warfarin therapy. No change to weekly warfarin dosing.     Description    CONTINUE: Warfarin 6mg daily except 8 mg on     Call with medications changes, especially antibiotics and steroids and including any over-the-counter medications or herbal Psychotherapy Group Note    PATIENT'S NAME: Ami Burkett  MRN:   [id number]  :   1991  ACCT. NUMBER: [id number]  DATE OF SERVICE: 8/15/22  START TIME:  3:00 PM  END TIME:  3:50 PM  FACILITATOR: Karrie Hunter LGSW  TOPIC: MH EBP Group: Symptom Awareness  [facility]+ [facility]  TRACK: A2    NUMBER OF PARTICIPANTS: 7    Summary of Group / Topics Discussed:  Symptom Awareness: Depression: Patients received a general overview of Major Depressive Disorder and how it relates to their current symptoms. The purpose is to promote understanding of their diagnoses and how it impacts their functioning. Patients reviewed their current awareness of symptoms and diagnoses. Patients received information regarding diagnoses, etiology, cultural, and environmental factors as well as impact on functioning.     Patient Session Goals / Objectives:    Discussed patient individual symptoms and experiences    Reviewed diagnostic criteria and etiology of diagnoses                                       Service Modality:  Video Visit     Telemedicine Visit: The patient's condition can be safely assessed and treated via synchronous audio and visual telemedicine encounter.      Reason for Telemedicine Visit: Services only offered telehealth    Originating Site (Patient Location): Patient's home    Distant Site (Provider Location): Provider Remote Setting- Home Office    Consent:  The patient/guardian has verbally consented to: the potential risks and benefits of telemedicine (video visit) versus in person care; bill my insurance or make self-payment for services provided; and responsibility for payment of non-covered services.     Patient would like the video invitation sent by:  My Chart    Mode of Communication:  Video Conference via Medical Zoom    As the provider I attest to compliance with applicable laws and regulations related to telemedicine.            Patient Participation / Response:  Fully participated with the  group by sharing personal reflections / insights and openly received / provided feedback with other participants.    Demonstrated understanding of topics discussed through group discussion and participation, Demonstrated understanding of how information regarding symptoms can assist in management of symptoms, Identified / Expressed personal readiness to increase awareness of symptoms and apply skills as necessary and Verbalized understanding of how awareness can benefit mental health symptoms     Treatment Plan:  Patient has a current master individualized treatment plan.  See Epic treatment plan for more information.    Karrie Hunter LGSW

## 2022-08-26 ENCOUNTER — ANTI-COAG VISIT (OUTPATIENT)
Dept: PHARMACY | Age: 82
End: 2022-08-26
Payer: MEDICARE

## 2022-08-26 DIAGNOSIS — I48.91 ATRIAL FIBRILLATION, UNSPECIFIED TYPE (HCC): Primary | ICD-10-CM

## 2022-08-26 LAB — INTERNATIONAL NORMALIZATION RATIO, POC: 3.3

## 2022-08-26 PROCEDURE — 99211 OFF/OP EST MAY X REQ PHY/QHP: CPT

## 2022-08-26 PROCEDURE — 85610 PROTHROMBIN TIME: CPT

## 2022-08-26 NOTE — PROGRESS NOTES
Loida Lam is a 78 y.o. here for warfarin management. Ochoa Hathaway had an INR test today. Results were reviewed and appropriate warfarin management was completed. This visit was performed as: An in person visit. Protocols were followed with precautions to reduce the spread of COVID-19. Patient verifies current dosing regimen: Yes     Warfarin medication reviewed and updated on the patient 's home medication list: Yes   All other medications reviewed and updated on the patient 's home medication list: No new medications     Lab Results   Component Value Date    INR 3.3 08/26/2022    INR 3.4 08/05/2022    INR 3.4 07/15/2022       Patient Findings       Negatives:  Signs/symptoms of thrombosis, Signs/symptoms of bleeding, Change in health, Missed doses, Change in medications, Change in diet/appetite, Bruising            Anticoagulation Summary  As of 8/26/2022      INR goal:  2.0-3.0   TTR:  75.4 % (2.4 y)   INR used for dosing:  3.3 (8/26/2022)   Warfarin maintenance plan:  6 mg (4 mg x 1.5) every day   Weekly warfarin total:  42 mg   Plan last modified:  Kyle Sanabria RPH (8/26/2022)   Next INR check:  9/16/2022   Priority:  Maintenance   Target end date: Indefinite    Indications    Afib ([address] [I48.91]                 Anticoagulation Episode Summary       INR check location:  Anticoagulation Clinic    Preferred lab:      Send INR reminders to:  [facility] MANAGEMENT CLINICAL STAFF    Comments:  FAX Dr. Serina Fairchild. Batsheva Turner Q 6 weeks          Anticoagulation Care Providers       Provider Role Specialty Phone number    Mae Garcia MD Referring Cardiology [phone]            Warfarin assessment / plan:     Appears well  Supra-therapeutic INR. Denies signs and symptoms of bleeding/bruising. Denies medication changes. Denies extra warfarin doses. Decrease in vitamin K intake. INR > 3.0 times three. Decreased his weekly dose of warfarin by 4.5% (2 mg/week).   He has been having a decrease in vitamin K greens. Description    Hold warfarin on 8-26-22  NEW DOSE: Warfarin 6mg daily     Call with medications changes, especially antibiotics and steroids and including any over-the-counter medications or herbal products. Call if you stop any medications. Keep the number of servings of Vitamin K (dark green vegetables) consistent from week to week  He eats a large salad with several vegetables every evening. Broccoli once per week. Spinach once per month. Eats a airam lettuce salad daily. 2 Vodka martinis every evening         Immunization History   Administered Date(s) Administered    COVID-19, MODERNA BLUE border, Primary or Immunocompromised, (age 12y+), IM, 100 mcg/0.5mL 01/24/2021, 02/21/2021, 11/28/2021, 07/12/2022           Orders Placed This Encounter   Procedures    POCT INR     This external order was created through the results console. No orders of the defined types were placed in this encounter. Reviewed AVS with patient / caregiver.     Billing Points:  Adjust dosage and/or reconcile meds (fill pill box) </= 5 medications - 2 points       CLINICAL PHARMACY CONSULT: MED RECONCILIATION/REVIEW ADDENDUM    For Pharmacy Admin Tracking Only    Intervention Detail: Dose Adjustment: 1, reason: Therapy Optimization  Total # of Interventions Recommended: 2  Total # of Interventions Accepted: 2  Time Spent (min): 15

## 2022-09-16 ENCOUNTER — ANTI-COAG VISIT (OUTPATIENT)
Dept: PHARMACY | Age: 82
End: 2022-09-16
Payer: MEDICARE

## 2022-09-16 DIAGNOSIS — I48.91 ATRIAL FIBRILLATION, UNSPECIFIED TYPE (HCC): Primary | ICD-10-CM

## 2022-09-16 LAB — INTERNATIONAL NORMALIZATION RATIO, POC: 2.1

## 2022-09-16 PROCEDURE — 85610 PROTHROMBIN TIME: CPT

## 2022-09-16 PROCEDURE — 99211 OFF/OP EST MAY X REQ PHY/QHP: CPT

## 2022-09-16 NOTE — PROGRESS NOTES
Felicia Avalos is a 78 y.o. here for warfarin management. Estefanía Bustos had an INR test today. Results were reviewed and appropriate warfarin management was completed. This visit was performed as: An in person visit. Protocols were followed with precautions to reduce the spread of COVID-19. Patient verifies current dosing regimen: Yes     Warfarin medication reviewed and updated on the patient 's home medication list: Yes   All other medications reviewed and updated on the patient 's home medication list: No new medications     Lab Results   Component Value Date    INR 2.1 2022    INR 3.3 2022    INR 3.4 2022       Patient Findings       Negatives:  Signs/symptoms of thrombosis, Signs/symptoms of bleeding, Change in health, Missed doses, Change in medications, Change in diet/appetite, Bruising            Anticoagulation Summary  As of 2022      INR goal:  2.0-3.0   TTR:  75.4 % (2.4 y)   INR used for dosin.1 (2022)   Warfarin maintenance plan:  6 mg (4 mg x 1.5) every day   Weekly warfarin total:  42 mg   Plan last modified:  Kaveh Tirado RPH (2022)   Next INR check:  10/28/2022   Priority:  Maintenance   Target end date: Indefinite    Indications    Afib ([address] [I48.91]                 Anticoagulation Episode Summary       INR check location:  Anticoagulation Clinic    Preferred lab:      Send INR reminders to:  Sofía Garcia MANAGEMENT CLINICAL STAFF    Comments:  FAX Dr. José Miguel Mcarthur. [place] Co[place] Q 6 weeks          Anticoagulation Care Providers       Provider Role Specialty Phone number    Katiana Alanis MD Referring Cardiology [phone]            Warfarin assessment / plan:     Appears well. No changes affecting warfarin therapy were noted. No acute findings. INR within goal range. No change to warfarin therapy today. Along with the last dose change, Estefanía Bustos has been eating broccoli 2-3 times a week. Instructed to cut back a little on the broccoli.       Description CONTINUE: Warfarin 6mg daily     Call with medications changes, especially antibiotics and steroids and including any over-the-counter medications or herbal products. Call if you stop any medications. Keep the number of servings of Vitamin K (dark green vegetables) consistent from week to week  He eats a large salad with several vegetables every evening. Broccoli once per week. Spinach once per month. Eats a airam lettuce salad daily. 2 Vodka martinis every evening         Immunization History   Administered Date(s) Administered    COVID-19, MODERNA BLUE border, Primary or Immunocompromised, (age 12y+), IM, 100 mcg/0.5mL 01/24/2021, 02/21/2021, 11/28/2021, 07/12/2022           Orders Placed This Encounter   Procedures    POCT INR     This external order was created through the results console. No orders of the defined types were placed in this encounter. Reviewed AVS with patient / caregiver.     Billing Points:  0 billing points this visit       CLINICAL PHARMACY CONSULT: MED RECONCILIATION/REVIEW ADDENDUM    For Pharmacy Admin Tracking Only    Intervention Detail:   Total # of Interventions Recommended: 1  Total # of Interventions Accepted: 1  Time Spent (min): 15

## 2022-10-28 ENCOUNTER — ANTI-COAG VISIT (OUTPATIENT)
Dept: PHARMACY | Age: 82
End: 2022-10-28
Payer: MEDICARE

## 2022-10-28 DIAGNOSIS — I48.91 ATRIAL FIBRILLATION, UNSPECIFIED TYPE (HCC): Primary | ICD-10-CM

## 2022-10-28 LAB — INTERNATIONAL NORMALIZATION RATIO, POC: 2.2

## 2022-10-28 PROCEDURE — 85610 PROTHROMBIN TIME: CPT

## 2022-10-28 PROCEDURE — 99211 OFF/OP EST MAY X REQ PHY/QHP: CPT

## 2022-10-28 NOTE — PROGRESS NOTES
Irene Cooper is a 78 y.o. here for warfarin management. Marizol Mcgowan had an INR test today. Results were reviewed and appropriate warfarin management was completed. This visit was performed as: An in person visit. Protocols were followed with precautions to reduce the spread of COVID-19. Patient verifies current warfarin dosing regimen: Yes     Warfarin medication reviewed and updated on the patient 's home medication list: Yes   All other medications reviewed and updated on the patient 's home medication list: No new medications     Lab Results   Component Value Date    INR 2.2 10/28/2022    INR 2.1 2022    INR 3.3 2022       Patient Findings       Negatives:  Signs/symptoms of thrombosis, Signs/symptoms of bleeding, Change in health, Missed doses, Change in medications, Change in diet/appetite, Bruising            Anticoagulation Summary  As of 10/28/2022      INR goal:  2.0-3.0   TTR:  76.5 % (2.6 y)   INR used for dosin.2 (10/28/2022)   Warfarin maintenance plan:  6 mg (4 mg x 1.5) every day; Starting 10/28/2022   Weekly warfarin total:  42 mg   Plan last modified:  Valentín Cassidy [facility] (2022)   Next INR check:  2022   Priority:  Maintenance   Target end date: Indefinite    Indications    Afib ([facility] [address] [I48.91]                 Anticoagulation Episode Summary       INR check location:  Anticoagulation Clinic    Preferred lab:      Send INR reminders to:  Elisa Robin MANAGEMENT CLINICAL STAFF    Comments:  FAX Dr. Katherine Monge. [place] Q 6 weeks          Anticoagulation Care Providers       Provider Role Specialty Phone number    Valerie Billings MD Referring Cardiology [phone]            There were no vitals taken for this visit. Warfarin assessment / plan:     Appears well. No changes affecting warfarin therapy were noted. No acute findings. INR within goal range. No change to warfarin dosing today.        Description    CONTINUE: Warfarin 6mg daily     Call with medications changes, especially antibiotics and steroids and including any over-the-counter medications or herbal products. Call if you stop any medications. Keep the number of servings of Vitamin K (dark green vegetables) consistent from week to week  He eats a large salad with several vegetables every evening. Broccoli once per week. Spinach once per month. Eats a airam lettuce salad daily. 2 Vodka martinis every evening         Immunization History   Administered Date(s) Administered    COVID-19, MODERNA BLUE border, Primary or Immunocompromised, (age 12y+), IM, 100 mcg/0.5mL 01/24/2021, 02/21/2021, 11/28/2021, 07/12/2022           Orders Placed This Encounter   Procedures    POCT INR     This external order was created through the results console. No orders of the defined types were placed in this encounter. Reviewed AVS with patient / caregiver.     Billing Points:  0 billing points this visit       CLINICAL PHARMACY CONSULT: MED RECONCILIATION/REVIEW ADDENDUM    For Pharmacy Admin Tracking Only    Intervention Detail:   Total # of Interventions Recommended: 0  Total # of Interventions Accepted: 0  Time Spent (min): 15

## 2022-11-01 ENCOUNTER — HOSPITAL ENCOUNTER (EMERGENCY)
Age: 82
Discharge: HOME | End: 2022-11-01
Attending: EMERGENCY MEDICINE
Payer: MEDICARE

## 2022-11-01 VITALS
TEMPERATURE: 97.9 F | BODY MASS INDEX: 29.71 KG/M2 | SYSTOLIC BLOOD PRESSURE: 100 MMHG | RESPIRATION RATE: 17 BRPM | DIASTOLIC BLOOD PRESSURE: 62 MMHG | HEART RATE: 72 BPM | HEIGHT: 74 IN | WEIGHT: 231.48 LBS | OXYGEN SATURATION: 95 %

## 2022-11-01 DIAGNOSIS — N30.01 ACUTE CYSTITIS WITH HEMATURIA: Primary | ICD-10-CM

## 2022-11-01 LAB
BACTERIA: ABNORMAL /HPF
BILIRUBIN URINE: NEGATIVE
BLOOD, URINE: ABNORMAL
CLARITY: ABNORMAL
COLOR: YELLOW
EPITHELIAL CELLS, UA: ABNORMAL /HPF (ref 0–5)
GLUCOSE URINE: NEGATIVE MG/DL
KETONES, URINE: NEGATIVE MG/DL
LEUKOCYTE ESTERASE, URINE: ABNORMAL
MICROSCOPIC EXAMINATION: YES
MUCUS: ABNORMAL /LPF
NITRITE, URINE: POSITIVE
PH UA: 5.5 (ref 5–8)
PROTEIN UA: 100 MG/DL
RBC UA: ABNORMAL /HPF (ref 0–4)
RENAL EPITHELIAL, UA: ABNORMAL /HPF (ref 0–1)
SPECIFIC GRAVITY UA: 1.02 (ref 1–1.03)
URINE REFLEX TO CULTURE: YES
URINE TYPE: ABNORMAL
UROBILINOGEN, URINE: 0.2 E.U./DL
WBC UA: >100 /HPF (ref 0–5)

## 2022-11-01 PROCEDURE — 81001 URINALYSIS AUTO W/SCOPE: CPT

## 2022-11-01 PROCEDURE — 6370000000 HC RX 637 (ALT 250 FOR IP): Performed by: EMERGENCY MEDICINE

## 2022-11-01 PROCEDURE — 87186 SC STD MICRODIL/AGAR DIL: CPT

## 2022-11-01 PROCEDURE — 99283 EMERGENCY DEPT VISIT LOW MDM: CPT

## 2022-11-01 PROCEDURE — 87086 URINE CULTURE/COLONY COUNT: CPT

## 2022-11-01 PROCEDURE — 87077 CULTURE AEROBIC IDENTIFY: CPT

## 2022-11-01 RX ORDER — CEPHALEXIN 500 MG/1
500 CAPSULE ORAL 4 TIMES DAILY
Qty: 40 CAPSULE | Refills: 0 | Status: SHIPPED | OUTPATIENT
Start: 2022-11-01 | End: 2022-11-11

## 2022-11-01 RX ORDER — CEPHALEXIN 500 MG/1
500 CAPSULE ORAL ONCE
Status: COMPLETED | OUTPATIENT
Start: 2022-11-01 | End: 2022-11-01

## 2022-11-01 RX ADMIN — CEPHALEXIN 500 MG: 500 CAPSULE ORAL at 11:20

## 2022-11-01 ASSESSMENT — PAIN - FUNCTIONAL ASSESSMENT
PAIN_FUNCTIONAL_ASSESSMENT: NONE - DENIES PAIN
PAIN_FUNCTIONAL_ASSESSMENT: NONE - DENIES PAIN

## 2022-11-01 ASSESSMENT — LIFESTYLE VARIABLES
HOW OFTEN DO YOU HAVE A DRINK CONTAINING ALCOHOL: 4 OR MORE TIMES A WEEK
HOW MANY STANDARD DRINKS CONTAINING ALCOHOL DO YOU HAVE ON A TYPICAL DAY: 1 OR 2

## 2022-11-01 ASSESSMENT — PAIN DESCRIPTION - PAIN TYPE: TYPE: ACUTE PAIN

## 2022-11-01 ASSESSMENT — PAIN DESCRIPTION - FREQUENCY: FREQUENCY: INTERMITTENT

## 2022-11-01 NOTE — ED PROVIDER NOTES
[facility] Emergency Department - Caleb Brewster MD, am the primary clinician of record. CHIEF COMPLAINT  Chief Complaint   Patient presents with    Urinary Frequency     Pt states he is having urinary frequency and urgency. Pt states he was unable to get into his urologist until nov 15th and his family doctor could not get him in either. HISTORY OF PRESENT ILLNESS  Carmen Mckeon is a 78 y.o. male who presents to the ED complaining of cloudy malodorous urine with frequency and burning. No fevers. No abdominal flank or back pain. He reports some mild hematuria. He reports urinary urgency as well to the point where he is having trouble sleeping. He says he feels fine except for his urinary symptoms. No lightheadedness or dizziness or near syncope. No CP cough or SOB. He has anticoagulation on Coumadin due to afib. Onset a few days ago. No n/v/d/c. No other complaints, modifying factors or associated symptoms. Nursing notes reviewed.    Past Medical History:   Diagnosis Date    Atrial fibrillation (HCC)     CHF (congestive heart failure) ([facility] [address]     Hypertension     Seizures ([facility] [address] 2013    X 1 dehydration     Skin tear of right elbow without complication 6/88/7879    Thyroid disease      Past Surgical History:   Procedure Laterality Date    CATARACT REMOVAL WITH IMPLANT Left     COLONOSCOPY      HERNIA REPAIR      3/2021    INTRACAPSULAR CATARACT EXTRACTION Right 5/20/2021    RIGHT EYE Phacoemulsification with intraocular  lens implant performed by Sumit Hess MD at [address] [facility] Right     meniscus tear     Family History   Problem Relation Age of Onset    No Known Problems Mother     No Known Problems Father      Social History     Socioeconomic History    Marital status:      Spouse name: Not on file    Number of children: Not on file    Years of education: Not on file    Highest education level: Not on file   Occupational History    Not on file   Tobacco Use    Smoking status: Former     Types: Cigarettes     Quit date:      Years since quittin.8    Smokeless tobacco: Never   Vaping Use    Vaping Use: Never used   Substance and Sexual Activity    Alcohol use: Yes     Comment: Donnal Raúl before dinner     Drug use: Never    Sexual activity: Not Currently   Other Topics Concern    Not on file   Social History Narrative    Not on file     Social Determinants of Health     Financial Resource Strain: Not on file   Food Insecurity: Not on file   Transportation Needs: Not on file   Physical Activity: Not on file   Stress: Not on file   Social Connections: Not on file   Intimate Partner Violence: Not on file   Housing Stability: Not on file     Current Facility-Administered Medications   Medication Dose Route Frequency Provider Last Rate Last Admin    cephALEXin (KEFLEX) capsule 500 mg  500 mg Oral Once Zoe Casarez MD         Current Outpatient Medications   Medication Sig Dispense Refill    cephALEXin (KEFLEX) 500 MG capsule Take 1 capsule by mouth 4 times daily for 10 days 40 capsule 0    metoprolol succinate (TOPROL XL) 100 MG extended release tablet Take 100 mg by mouth daily      lisinopril (PRINIVIL;ZESTRIL) 5 MG tablet Take 5 mg by mouth daily      warfarin (COUMADIN) 4 MG tablet Take 6 mg by mouth daily or as directed by [facility] Coumadin Service [phone]  Goal INR = 2-3      dofetilide (TIKOSYN) 500 MCG capsule Take 500 mcg by mouth 2 times daily      aspirin 81 MG EC tablet Take 81 mg by mouth daily      finasteride (PROSCAR) 5 MG tablet Take 5 mg by mouth daily      levETIRAcetam (KEPPRA) 500 MG tablet Take 500 mg by mouth 2 times daily      levothyroxine (SYNTHROID) 50 MCG tablet Take 50 mcg by mouth Daily      terazosin (HYTRIN) 10 MG capsule Take 10 mg by mouth nightly       No Known Allergies    REVIEW OF SYSTEMS  6 systems reviewed, pertinent positives per HPI otherwise noted to be negative    PHYSICAL EXAM   /62   Pulse 72   Temp 97.9 °F (36.6 °C) (Oral)   Resp 17   Ht 6' 2\" (1.88 m)   Wt 231 lb 7.7 oz (105 kg)   SpO2 95%   BMI 29.72 kg/m²    GENERAL APPEARANCE: Awake and alert. Cooperative. No acute distress. HEAD: Normocephalic. Atraumatic. EYES: PERRL. EOM's grossly intact. ENT: Mucous membranes are moist.   NECK: Supple. Normal ROM. CHEST: Equal symmetric chest rise. RRR  LUNGS: Breathing is unlabored. Speaking comfortably in full sentences. CTAB  ABDOMEN: Nondistended, nontender, no CVAT bilat. EXTREMITIES: MAEE. No acute deformities. SKIN: Warm and dry. No acute rashes. NEUROLOGICAL: Alert and oriented. Strength is 5/5 in all extremities and sensation is intact. LABS  I have reviewed all labs for this visit. Results for orders placed or performed during the hospital encounter of 11/01/22   Urinalysis with Reflex to Culture    Specimen: Urine, clean catch   Result Value Ref Range    Color, UA Yellow Straw/Yellow    Clarity, UA CLOUDY (A) Clear    Glucose, Ur Negative Negative mg/dL    Bilirubin Urine Negative Negative    Ketones, Urine Negative Negative mg/dL    Specific Gravity, UA 1.025 1.005 - 1.030    Blood, Urine LARGE (A) Negative    pH, UA 5.5 5.0 - 8.0    Protein,  (A) Negative mg/dL    Urobilinogen, Urine 0.2 <2.0 E.U./dL    Nitrite, Urine POSITIVE (A) Negative    Leukocyte Esterase, Urine LARGE (A) Negative    Microscopic Examination YES     Urine Type Cleancatch        ED COURSE/MDM  The patient's ED course was notable for acute cystitis with hematuria. The patient has cloudy appearing urine and symptoms of urinary urgency frequency and dysuria. However he has no abdominal flank or back pain, he is not tachycardic febrile tachypneic or unwell appearing. His initial blood pressure was slightly low but I rechecked it and it was 100/62 which he says is near his baseline.   He is also not symptomatic from any sort of low blood pressure so do not feel a further work-up is warranted at this point based on that report. Hydration was encouraged at home. He declined blood work today but I did encourage him to return to the ED with any new or worsening such as fevers abdominal pain flank pain back pain vomiting or worsening urinary symptoms despite Keflex. At that point we would revisit the possibility of needing labs/imaging. Additionally, urine culture is still pending and will need to be reviewed to ensure sensitivities match with Keflex. I recommended follow-up with PCP and/or urology within a week. 2:10 PM  UPDATE - pharmacy called stating one of his medications interacts with Keflex and advised a change. Ceftin does not have this interaction so the antibiotic given was not Keflex - it was Ceftin 250mg BID x 10 days. CLINICAL IMPRESSION  1. Acute cystitis with hematuria        Blood pressure 100/62, pulse 72, temperature 97.9 °F (36.6 °C), temperature source Oral, resp. rate 17, height 6' 2\" (1.88 m), weight 231 lb 7.7 oz (105 kg), SpO2 95 %. DISPOSITION    I have discussed the findings of today's workup with the patient and addressed the patient's questions and concerns. Important warning signs as well as new or worsening symptoms which would necessitate immediate return to the ED were discussed. The plan is to discharge from the ED at this time, and the patient is in stable condition. The patient acknowledged understanding is agreeable with this plan. Follow-up with:  Bladimir Magdaleno [id number]  [phone]    Schedule an appointment as soon as possible for a visit in 1 week  For symptom re-evaluation    [facility]  [address] [address]  [phone]  Go to   If symptoms worsen    This chart was created using Dragon dictation software. Efforts were made by me to ensure accuracy, however some errors may be present due to limitations of this technology.         Misha Butler MD  11/01/22 Ag 475MD [place]  11/01/22 6204

## 2022-11-04 LAB
ORGANISM: ABNORMAL
URINE CULTURE, ROUTINE: ABNORMAL

## 2022-11-04 NOTE — RESULT ENCOUNTER NOTE
Culture reviewed, culture is positive but resistant to antibiotics prescribed at discharge. Antibiotic needs to be changed to Bactrim DS to take 1 tablet twice a day for 10 days #20. Call this into the pharmacy of choice.

## 2022-11-05 NOTE — RESULT ENCOUNTER NOTE
Patient's positive result has been appropriately evaluated by the provider pool. Patient was unable to be reached over the phone. A voicemail was left with the patient. Will await a return phone call. Will try to reach patient again tomorrow per protocol. NOTE:  According to the [address] [place] [address] dictation, the Rx was changed to Ceftin. This should be sensitive, according to Dr. Uriel Balderas. However, if the patient says he is not improving, then we should call in the Bactrim DS.

## 2022-11-05 NOTE — RESULT ENCOUNTER NOTE
Patient's positive result has been appropriately evaluated by the provider pool. Patient was contacted and notified of the results. Patient returned call and states he is feeling much better and is taking the Ceftin. He has a recheck appointment on Monday with his PCP. No change in the antibiotic per order Dr. Phani Chow.

## 2022-11-08 ENCOUNTER — TELEPHONE (OUTPATIENT)
Dept: PHARMACY | Age: 82
End: 2022-11-08

## 2022-11-08 RX ORDER — WARFARIN SODIUM 4 MG/1
6 TABLET ORAL DAILY
Qty: 135 TABLET | Refills: 3 | Status: SHIPPED | OUTPATIENT
Start: 2022-11-08

## 2022-11-08 NOTE — TELEPHONE ENCOUNTER
Andrew Barbosa called and requested a refill on warfarin. 90 day supply. Orders Placed This Encounter   Medications    warfarin (COUMADIN) 4 MG tablet     Sig: Take 1.5 tablets by mouth daily or as directed by [facility] Coumadin [facility] [phone]     Dispense:  135 tablet     Refill:  3     Did not seem to go through electronically. I called this in as well.      Esme Peacock, PharmD, [address] [facility]  Anticoagulation  [phone]    For Pharmacy [address] [place] [address] Only    Intervention Detail: New Rx: 1, reason: Needs Additional Therapy  Total # of Interventions Recommended: 1  Total # of Interventions Accepted: 1  Time Spent (min): 15

## 2022-11-18 ENCOUNTER — TELEPHONE (OUTPATIENT)
Dept: PHARMACY | Age: 82
End: 2022-11-18

## 2022-11-18 NOTE — TELEPHONE ENCOUNTER
Marizol Mcgowan called . Finished nitrofurantoin and getting a second round today. Physician wants INR checked in 1 week. No interaction w warfarin expected, but we scheduled for 11/29/22. Faustina Robertson, PharmD, [address] [facility]  [phone]    For Pharmacy Admin Tracking Only    Intervention Detail:   Total # of Interventions Recommended:    Total # of Interventions Accepted:   Time Spent (min): 10

## 2022-11-29 ENCOUNTER — ANTI-COAG VISIT (OUTPATIENT)
Dept: PHARMACY | Age: 82
End: 2022-11-29
Payer: MEDICARE

## 2022-11-29 DIAGNOSIS — I48.91 ATRIAL FIBRILLATION, UNSPECIFIED TYPE (HCC): Primary | ICD-10-CM

## 2022-11-29 LAB — INTERNATIONAL NORMALIZATION RATIO, POC: 3.4

## 2022-11-29 PROCEDURE — 99211 OFF/OP EST MAY X REQ PHY/QHP: CPT

## 2022-11-29 PROCEDURE — 85610 PROTHROMBIN TIME: CPT

## 2022-11-29 NOTE — PROGRESS NOTES
Lore Bowles is a 78 y.o. here for warfarin management. Feliberto Manjarrez had an INR test today. Results were reviewed and appropriate warfarin management was completed. This visit was performed as: An in person visit. Protocols were followed with precautions to reduce the spread of COVID-19. Patient verifies current warfarin dosing regimen: Yes     Warfarin medication reviewed and updated on the patient 's home medication list: Yes   All other medications reviewed and updated on the patient 's home medication list: Yes     Lab Results   Component Value Date    INR 3.4 11/29/2022    INR 2.2 10/28/2022    INR 2.1 09/16/2022       Patient Findings       Negatives:  Signs/symptoms of thrombosis, Signs/symptoms of bleeding, Change in health, Missed doses, Change in medications, Change in diet/appetite, Bruising            Anticoagulation Summary  As of 11/29/2022      INR goal:  2.0-3.0   TTR:  76.2 % (2.6 y)   INR used for dosing:  3.4 (11/29/2022)   Warfarin maintenance plan:  6 mg (4 mg x 1.5) every day; Starting 11/29/2022   Weekly warfarin total:  42 mg   Plan last modified:  Garcia Shah RPH (8/26/2022)   Next INR check:  12/27/2022   Priority:  Maintenance   Target end date: Indefinite    Indications    Afib ([facility] [address] [I48.91]                 Anticoagulation Episode Summary       INR check location:  Anticoagulation Clinic    Preferred lab:      Send INR reminders to:  Dulce Maria Terrazas MANAGEMENT CLINICAL STAFF    Comments:  FAX Dr. Ilsa Tejada. Marino Hidden Marino Hidden Q 6 weeks          Anticoagulation Care Providers       Provider Role Specialty Phone number    Stephanie Barraza MD Referring Cardiology [phone]            There were no vitals taken for this visit. Warfarin assessment / plan:     Appears well  Supra-therapeutic INR. Denies signs and symptoms of bleeding/bruising. Denies extra warfarin doses. Medication changes. Feliberto Manjarrez states he was give two courses of Macrobid due to a UTI.   Antibiotic switched to Augmenting for 10 days (currently has 2 days left). Did not call clinic to advise RE: Augmentin. INR high today. Made changes below. Instructed to call with any new medications or medication changes. Description    Hold warfarin on 11-29-22 due to Augmentin  CONTINUE: Warfarin 6mg daily     Call with medications changes, especially antibiotics and steroids and including any over-the-counter medications or herbal products. Call if you stop any medications. Keep the number of servings of Vitamin K (dark green vegetables) consistent from week to week  He eats a large salad with several vegetables every evening. Broccoli once per week. Spinach once per month. Eats a airam lettuce salad daily. 2 Vodka martinis every evening         Immunization History   Administered Date(s) Administered    COVID-19, MODERNA BLUE border, Primary or Immunocompromised, (age 12y+), IM, 100 mcg/0.5mL 01/24/2021, 02/21/2021, 11/28/2021, 07/12/2022           Orders Placed This Encounter   Procedures    POCT INR     This external order was created through the results console. No orders of the defined types were placed in this encounter. Reviewed AVS with patient / caregiver.     Billing Points:  1 billing points this visit       CLINICAL PHARMACY CONSULT: MED RECONCILIATION/REVIEW ADDENDUM    For Pharmacy Admin Tracking Only    Intervention Detail: Dose Adjustment: 1, reason: Therapy Optimization  Total # of Interventions Recommended: 2  Total # of Interventions Accepted: 2  Time Spent (min): 15

## 2023-01-06 ENCOUNTER — ANTI-COAG VISIT (OUTPATIENT)
Dept: PHARMACY | Age: 83
End: 2023-01-06
Payer: MEDICARE

## 2023-01-06 DIAGNOSIS — I48.91 ATRIAL FIBRILLATION, UNSPECIFIED TYPE (HCC): Primary | ICD-10-CM

## 2023-01-06 LAB — INTERNATIONAL NORMALIZATION RATIO, POC: 2.6

## 2023-01-06 PROCEDURE — 85610 PROTHROMBIN TIME: CPT

## 2023-01-06 PROCEDURE — 99211 OFF/OP EST MAY X REQ PHY/QHP: CPT

## 2023-01-06 NOTE — PROGRESS NOTES
Sylvia Barrientos is a 78 y.o. here for warfarin management. Shari Schwarz had an INR test today. Results were reviewed and appropriate warfarin management was completed. This visit was performed as: An in person visit. Protocols were followed with precautions to reduce the spread of COVID-19. Patient verifies current warfarin dosing regimen: Yes     Warfarin medication reviewed and updated on the patient 's home medication list: Yes   All other medications reviewed and updated on the patient 's home medication list: No new medications     Lab Results   Component Value Date    INR 2.6 2023    INR 3.4 2022    INR 2.2 10/28/2022           Anticoagulation Summary  As of 2023      INR goal:  2.0-3.0   TTR:  75.2 % (2.8 y)   INR used for dosin.6 (2023)   Warfarin maintenance plan:  6 mg (4 mg x 1.5) every day; Starting 2023   Weekly warfarin total:  42 mg   Plan last modified:  Jesse Bonilla RP (2022)   Next INR check:  2023   Priority:  Maintenance   Target end date: Indefinite    Indications    Afib ([address] [I48.91]                 Anticoagulation Episode Summary       INR check location:  Anticoagulation Clinic    Preferred lab:      Send INR reminders to:  Denae Sanders MANAGEMENT CLINICAL STAFF    Comments:  FAX Dr. Shameka Berrios. Leonel Shaw Q 6 weeks          Anticoagulation Care Providers       Provider Role Specialty Phone number    Ihsan Landin MD Referring Cardiology [phone]            There were no vitals taken for this visit. Warfarin assessment / plan:     Appears well. No changes affecting warfarin therapy were noted. No acute findings. INR within goal range. No change to warfarin dosing today. Description    CONTINUE: Warfarin 6mg daily     Call with medications changes, especially antibiotics and steroids and including any over-the-counter medications or herbal products. Call if you stop any medications.     Keep the number of servings of Vitamin K (dark green vegetables) consistent from week to week  He eats a large salad with several vegetables every evening. Broccoli/spinach once per week. 2 Vodka martinis every evening         Immunization History   Administered Date(s) Administered    COVID-19, MODERNA BLUE border, Primary or Immunocompromised, (age 12y+), IM, 100 mcg/0.5mL 01/24/2021, 02/21/2021, 11/28/2021, 07/12/2022           Orders Placed This Encounter   Procedures    POCT INR     This external order was created through the results console. No orders of the defined types were placed in this encounter. Reviewed AVS with patient / caregiver.     Billing Points:  0 billing points this visit       CLINICAL PHARMACY CONSULT: MED RECONCILIATION/REVIEW ADDENDUM    For Pharmacy Admin Tracking Only    Intervention Detail:   Total # of Interventions Recommended: 0  Total # of Interventions Accepted: 0  Time Spent (min): 15

## 2023-02-17 ENCOUNTER — ANTI-COAG VISIT (OUTPATIENT)
Dept: PHARMACY | Age: 83
End: 2023-02-17
Payer: MEDICARE

## 2023-02-17 DIAGNOSIS — I48.91 ATRIAL FIBRILLATION, UNSPECIFIED TYPE (HCC): Primary | ICD-10-CM

## 2023-02-17 LAB — INTERNATIONAL NORMALIZATION RATIO, POC: 2.1

## 2023-02-17 PROCEDURE — 99211 OFF/OP EST MAY X REQ PHY/QHP: CPT

## 2023-02-17 PROCEDURE — 85610 PROTHROMBIN TIME: CPT

## 2023-03-31 ENCOUNTER — ANTI-COAG VISIT (OUTPATIENT)
Dept: PHARMACY | Age: 83
End: 2023-03-31
Payer: MEDICARE

## 2023-03-31 DIAGNOSIS — I48.91 ATRIAL FIBRILLATION, UNSPECIFIED TYPE (HCC): Primary | ICD-10-CM

## 2023-03-31 LAB — INTERNATIONAL NORMALIZATION RATIO, POC: 2.1

## 2023-03-31 PROCEDURE — 99211 OFF/OP EST MAY X REQ PHY/QHP: CPT

## 2023-03-31 PROCEDURE — 85610 PROTHROMBIN TIME: CPT

## 2023-03-31 NOTE — PROGRESS NOTES
weekly dosing at this time. Instructed him to call with any new medications. New warfarin prescription he would like to change to 6 mg tablets. He will call and let us know when he needs them. Description    CONTINUE: Warfarin 6mg daily     Call with medications changes, especially antibiotics and steroids and including any over-the-counter medications or herbal products. Call if you stop any medications. Keep the number of servings of Vitamin K (dark green vegetables) consistent from week to week  He eats a large salad with several vegetables every evening. Broccoli/spinach once per week. 2 Vodka martinis every evening         Immunization History   Administered Date(s) Administered    COVID-19, MODERNA BLUE border, Primary or Immunocompromised, (age 12y+), IM, 100 mcg/0.5mL 01/24/2021, 02/21/2021, 11/28/2021, 07/12/2022           Orders Placed This Encounter   Procedures    POCT INR     This external order was created through the results console. No orders of the defined types were placed in this encounter. Reviewed AVS with patient / caregiver.     Billing Points:  0 billing points this visit     For Pharmacy Admin Tracking Only    Intervention Detail:   Total # of Interventions Recommended: 1  Total # of Interventions Accepted: 1  Time Spent (min): 15

## 2023-05-01 ENCOUNTER — TELEPHONE (OUTPATIENT)
Dept: PHARMACY | Age: 83
End: 2023-05-01

## 2023-05-01 NOTE — TELEPHONE ENCOUNTER
Seble Bradyeric called requesting a refill for his warfarin. Since his daily dose is now 6mg daily, he would like to use 6mg tablets, instead of the 4mg tablets.  The following will be called to his pharmacy:    [facility] phone [phone]  Coumadin 6mg tablets  #90 tablets, UD, 3RF  Physician : Maribell Christianson Only    Intervention Detail: Refill(s) Provided  Total # of Interventions Recommended: 1  Total # of Interventions Accepted: 1  Time Spent (min): 15

## 2023-05-12 ENCOUNTER — ANTI-COAG VISIT (OUTPATIENT)
Dept: PHARMACY | Age: 83
End: 2023-05-12
Payer: MEDICARE

## 2023-05-12 DIAGNOSIS — I48.91 ATRIAL FIBRILLATION, UNSPECIFIED TYPE (HCC): Primary | ICD-10-CM

## 2023-05-12 LAB — INTERNATIONAL NORMALIZATION RATIO, POC: 2.1

## 2023-05-12 PROCEDURE — 85610 PROTHROMBIN TIME: CPT

## 2023-05-12 PROCEDURE — 99211 OFF/OP EST MAY X REQ PHY/QHP: CPT

## 2023-05-12 RX ORDER — WARFARIN SODIUM 6 MG/1
6 TABLET ORAL DAILY
COMMUNITY

## 2023-05-12 NOTE — PROGRESS NOTES
therapy. Discussed keeping his INR in the middle of the range. Changed dosing as below. Began nitrofurantoin for UTI (this will be his third abx). No interaction with warfarin. Description    NEW DOSE:  Warfarin 6mg daily except 8 mg on     Call with medications changes, especially antibiotics and steroids and including any over-the-counter medications or herbal products. Call if you stop any medications. Keep the number of servings of Vitamin K (dark green vegetables) consistent from week to week  He eats a large salad with several vegetables every evening. Broccoli/spinach once per week. 2 Vodka martinis every evening       Immunization History   Administered Date(s) Administered    COVID-19, MODERNA BLUE border, Primary or Immunocompromised, (age 12y+), IM, 100 mcg/0.5mL 2021, 2021, 2021, 2022       Orders Placed This Encounter   Procedures    POCT INR     This external order was created through the results console. No orders of the defined types were placed in this encounter. Reviewed AVS with patient / caregiver.     Billing Points:  Adjust dosage and/or reconcile meds (fill pill box) </= 5 medications - 2 points     For Pharmacy Admin Tracking Only    Intervention Detail: Adherence Monitorin and Dose Adjustment: 1, reason: Therapy Optimization  Total # of Interventions Recommended: 1  Total # of Interventions Accepted: 1  Time Spent (min): 20

## 2023-06-02 ENCOUNTER — ANTI-COAG VISIT (OUTPATIENT)
Dept: PHARMACY | Age: 83
End: 2023-06-02
Payer: MEDICARE

## 2023-06-02 DIAGNOSIS — I48.91 ATRIAL FIBRILLATION, UNSPECIFIED TYPE (HCC): Primary | ICD-10-CM

## 2023-06-02 LAB — INTERNATIONAL NORMALIZATION RATIO, POC: 2.9

## 2023-06-02 PROCEDURE — 99211 OFF/OP EST MAY X REQ PHY/QHP: CPT

## 2023-06-02 PROCEDURE — 85610 PROTHROMBIN TIME: CPT

## 2023-06-02 RX ORDER — NITROFURANTOIN MACROCRYSTALS 100 MG/1
100 CAPSULE ORAL
COMMUNITY

## 2023-06-02 NOTE — PROGRESS NOTES
Alek Kwok is a 78 y.o. here for warfarin management. Jw Solid had an INR test today. Results were reviewed and appropriate warfarin management was completed. Patient verifies current warfarin dosing regimen: Yes     Warfarin medication reviewed and updated on the patient 's home medication list: Yes   All other medications reviewed and updated on the patient 's home medication list: No new medications     Lab Results   Component Value Date    INR 2.9 2023    INR 2.1 2023    INR 2.1 2023     Patient Findings       Positives:  Change in medications    Negatives:  Signs/symptoms of thrombosis, Signs/symptoms of bleeding, Change in health, Missed doses, Change in diet/appetite, Bruising    Comments:  Macrobid 100 mg two times per week for prophylaxis-UTI          Anticoagulation Summary  As of 2023      INR goal:  2.0-3.0   TTR:  78.3 % (3.2 y)   INR used for dosin.9 (2023)   Warfarin maintenance plan:  8 mg (6 mg x 1 and 4 mg x 0.5) every Fri; 6 mg (6 mg x 1) all other days   Weekly warfarin total:  44 mg   Plan last modified:  Danitza Uribe [facility] (2023)   Next INR check:  2023   Priority:  Maintenance   Target end date: Indefinite    Indications    Afib ([facility] [address] [I48.91]                 Anticoagulation Episode Summary       INR check location:  Anticoagulation Clinic    Preferred lab:      Send INR reminders to:  Dianna Mendes MANAGEMENT CLINICAL STAFF    Comments:  FAX Dr. Ana Cristina Parham. Zarina Armstrong Q 6 weeks          Anticoagulation Care Providers       Provider Role Specialty Phone number    Dorcas Gamboa MD Referring Cardiology [phone]          There were no vitals taken for this visit. Warfarin assessment / plan:     Patient appears well today. No changes affecting warfarin therapy were noted. No acute findings with regards to warfarin therapy. INR today is within goal range. Instructed to continue the same weekly warfarin dose. Dub Solid states doing well.   No

## 2023-07-14 ENCOUNTER — ANTI-COAG VISIT (OUTPATIENT)
Dept: PHARMACY | Age: 83
End: 2023-07-14
Payer: MEDICARE

## 2023-07-14 DIAGNOSIS — I48.91 ATRIAL FIBRILLATION, UNSPECIFIED TYPE (HCC): Primary | ICD-10-CM

## 2023-07-14 LAB — INTERNATIONAL NORMALIZATION RATIO, POC: 2.9

## 2023-07-14 PROCEDURE — 85610 PROTHROMBIN TIME: CPT

## 2023-07-14 PROCEDURE — 99212 OFFICE O/P EST SF 10 MIN: CPT

## 2023-07-14 RX ORDER — MULTIVITAMIN WITH IRON
250 TABLET ORAL DAILY
COMMUNITY

## 2023-07-14 NOTE — PROGRESS NOTES
Indra Barnes is a 78 y.o. here for warfarin management. Azam Conner had an INR test today. Results were reviewed and appropriate warfarin management was completed. Patient verifies current warfarin dosing regimen: Yes     Warfarin medication reviewed and updated on the patient 's home medication list: Yes   All other medications reviewed and updated on the patient 's home medication list: Yes     Lab Results   Component Value Date    INR 2.9 2023    INR 2.9 2023    INR 2.1 2023     Patient Findings       Positives:  Change in medications    Negatives:  Signs/symptoms of thrombosis, Signs/symptoms of bleeding, Change in diet/appetite, Bruising    Comments:  Started Magnesium - no interaction  Nitrofurantoin twice a week started ~ - no interaction           Anticoagulation Summary  As of 2023      INR goal:  2.0-3.0   TTR:  79.1 % (3.3 y)   INR used for dosin.9 (2023)   Warfarin maintenance plan:  6 mg (6 mg x 1) every day   Weekly warfarin total:  42 mg   [facility]   Next INR check:  2023   Priority:  Maintenance   Target end date: Indefinite    Indications    Afib ([address]) [I48.91]                 Anticoagulation Episode Summary       INR check location:  Anticoagulation Clinic    Preferred lab:      Send INR reminders to:  Nicholas Moreira MANAGEMENT CLINICAL STAFF    Comments:  FAX Dr. Nicky Moya. Ginger Bui Q 6 weeks          Anticoagulation Care Providers       Provider Role Specialty Phone number    David Borrero MD Referring Cardiology [phone]          There were no vitals taken for this visit. Warfarin assessment / plan:     Patient appears well today. INR today is within goal range. INR was at 2.9 last visit. He would like his INR closer to 2.5. He has been eating ad slaw vs spinach or broccoli once a week. This may be contributing to the INR on the high end of his goal range.      We talked about lower the warfarin dose as

## 2023-08-25 ENCOUNTER — ANTI-COAG VISIT (OUTPATIENT)
Dept: PHARMACY | Age: 83
End: 2023-08-25
Payer: MEDICARE

## 2023-08-25 DIAGNOSIS — I48.91 ATRIAL FIBRILLATION, UNSPECIFIED TYPE (HCC): Primary | ICD-10-CM

## 2023-08-25 LAB — INTERNATIONAL NORMALIZATION RATIO, POC: 2.4

## 2023-08-25 PROCEDURE — 99211 OFF/OP EST MAY X REQ PHY/QHP: CPT

## 2023-08-25 PROCEDURE — 85610 PROTHROMBIN TIME: CPT

## 2023-08-25 NOTE — PROGRESS NOTES
Sp Ceran is a 78 y.o. here for warfarin management. Magdalene Welch had an INR test today. Results were reviewed and appropriate warfarin management was completed. Patient verifies current warfarin dosing regimen: Yes     Warfarin medication reviewed and updated on the patient 's home medication list: Yes   All other medications reviewed and updated on the patient 's home medication list: Yes     Lab Results   Component Value Date    INR 2.4 2023    INR 2.9 2023    INR 2.9 2023     Patient Findings       Negatives:  Signs/symptoms of thrombosis, Signs/symptoms of bleeding, Bruising          Anticoagulation Summary  As of 2023      INR goal:  2.0-3.0   TTR:  79.8 % (3.4 y)   INR used for dosin.4 (2023)   Warfarin maintenance plan:  6 mg (6 mg x 1) every day   Weekly warfarin total:  42 mg   Plan last modified:  Grazyna Shin, [address] [facility] (2023)   Next INR check:  10/6/2023   Priority:  Maintenance   Target end date: Indefinite    Indications    Afib ([address]) [I48.91]                 Anticoagulation Episode Summary       INR check location:  Anticoagulation Clinic    Preferred lab:      Send INR reminders to:  Mando Romano MANAGEMENT CLINICAL STAFF    Comments:  FAX Dr. Lynn Michel. Patrice Rodgers Q 6 weeks          Anticoagulation Care Providers       Provider Role Specialty Phone number    Austin Melton MD Referring Cardiology [phone]          There were no vitals taken for this visit. Warfarin assessment / plan:     Patient appears well today. No changes affecting warfarin therapy were noted. No acute findings with regards to warfarin therapy. INR today is within goal range. Instructed to continue the same weekly warfarin dose. Description    Warfarin 6mg daily     He eats a large salad (airam) with several vegetables every evening. Broccoli/spinach once per week.     2 Vodka martinis every evening    Call with medications changes, especially antibiotics and steroids and

## 2023-10-13 ENCOUNTER — ANTI-COAG VISIT (OUTPATIENT)
Dept: PHARMACY | Age: 83
End: 2023-10-13
Payer: MEDICARE

## 2023-10-13 DIAGNOSIS — I48.91 ATRIAL FIBRILLATION, UNSPECIFIED TYPE (HCC): Primary | ICD-10-CM

## 2023-10-13 LAB — INTERNATIONAL NORMALIZATION RATIO, POC: 3.6

## 2023-10-13 PROCEDURE — 99211 OFF/OP EST MAY X REQ PHY/QHP: CPT

## 2023-10-13 PROCEDURE — 85610 PROTHROMBIN TIME: CPT

## 2023-10-13 RX ORDER — NITROFURANTOIN MACROCRYSTALS 50 MG/1
100 CAPSULE ORAL
COMMUNITY

## 2023-10-13 RX ORDER — NITROFURANTOIN 25 MG/5ML
50 SUSPENSION ORAL 4 TIMES DAILY
COMMUNITY
End: 2023-10-13

## 2023-10-13 NOTE — PROGRESS NOTES
Imelda Dow is a 78 y.o. here for warfarin management. Tammy Burns had an INR test today. Results were reviewed and appropriate warfarin management was completed. Patient verifies current warfarin dosing regimen: Yes     Warfarin medication reviewed and updated on the patient 's home medication list: Yes   All other medications reviewed and updated on the patient 's home medication list: No new medications     Lab Results   Component Value Date    INR 3.6 10/13/2023    INR 2.4 08/25/2023    INR 2.9 07/14/2023     Patient Findings       Positives: Other complaints    Negatives:  Signs/symptoms of thrombosis, Signs/symptoms of bleeding, Change in health, Missed doses, Change in medications, Change in diet/appetite, Bruising    Comments:  Lose 10 lbs in the last three weeks due to diet changes          Anticoagulation Summary  As of 10/13/2023      INR goal:  2.0-3.0   TTR:  78.7 % (3.5 y)   INR used for dosing:  3.6 (10/13/2023)   Warfarin maintenance plan:  6 mg (6 mg x 1) every day   Weekly warfarin total:  42 mg   Plan last modified:  Sharla Casey RP (7/14/2023)   Next INR check:  11/10/2023   Priority:  Maintenance   Target end date: Indefinite    Indications    Afib ([address]) [I48.91]                 Anticoagulation Episode Summary       INR check location:  Anticoagulation Clinic    Preferred lab:      Send INR reminders to:  Austin Malcolm MANAGEMENT CLINICAL STAFF    Comments:  FAX Dr. Brittany Downey. Tana Plants Tana Plants Q 6 weeks          Anticoagulation Care Providers       Provider Role Specialty Phone number    Mark Vásquez MD Referring Cardiology [phone]          There were no vitals taken for this visit. Warfarin assessment / plan:     Appears well  Supra-therapeutic INR. Denies signs and symptoms of bleeding/bruising. Denies medication changes. Denies extra warfarin doses. Decrease in vitamin K intake. Tammy Burns states doing well. No complaints regarding warfarin therapy.   He has diet changes from not eating

## 2023-11-10 ENCOUNTER — ANTI-COAG VISIT (OUTPATIENT)
Dept: PHARMACY | Age: 83
End: 2023-11-10
Payer: MEDICARE

## 2023-11-10 DIAGNOSIS — I48.91 ATRIAL FIBRILLATION, UNSPECIFIED TYPE (HCC): Primary | ICD-10-CM

## 2023-11-10 LAB — INTERNATIONAL NORMALIZATION RATIO, POC: 3.2

## 2023-11-10 PROCEDURE — 85610 PROTHROMBIN TIME: CPT

## 2023-11-10 PROCEDURE — 99212 OFFICE O/P EST SF 10 MIN: CPT

## 2023-11-10 NOTE — PROGRESS NOTES
Lam Cesar is a 78 y.o. here for warfarin management. Sanjuana Caceres had an INR test today. Results were reviewed and appropriate warfarin management was completed. Patient verifies current warfarin dosing regimen: Yes     Warfarin medication reviewed and updated on the patient 's home medication list: Yes   All other medications reviewed and updated on the patient 's home medication list: No new medications     Lab Results   Component Value Date    INR 3.2 11/10/2023    INR 3.6 10/13/2023    INR 2.4 08/25/2023     Patient Findings       Negatives:  Signs/symptoms of thrombosis, Signs/symptoms of bleeding, Change in health, Missed doses, Change in medications, Change in diet/appetite, Bruising          Anticoagulation Summary  As of 11/10/2023      INR goal:  2.0-3.0   TTR:  77.0 % (3.6 y)   INR used for dosing:  3.2 (11/10/2023)   Warfarin maintenance plan:  3 mg (6 mg x 0.5) every Fri; 6 mg (6 mg x 1) all other days   Weekly warfarin total:  39 mg   Plan last modified:  Valorie Santizo [facility] (11/10/2023)   Next INR check:  12/1/2023   Priority:  Maintenance   Target end date: Indefinite    Indications    Afib ([address]) [I48.91]                 Anticoagulation Episode Summary       INR check location:  Anticoagulation Clinic    Preferred lab:      Send INR reminders to:  Mana Zamora MANAGEMENT CLINICAL STAFF    Comments:  FAX Dr. Sheridan Borrero. Kia Watkins Q 6 weeks          Anticoagulation Care Providers       Provider Role Specialty Phone number    Kody Carlson MD Referring Cardiology [phone]          There were no vitals taken for this visit. Warfarin assessment / plan:     Appears well  Supra-therapeutic INR. Denies signs and symptoms of bleeding/bruising. Denies medication changes. Denies extra warfarin doses. Denies change in appetite. Sanjuana Caceres states doing well. No complaints regarding warfarin therapy. INR > 3.0 times two, therefore will decrease weekly warfarin dosing by 7% (3 mg/week).   Will see again in

## 2023-12-01 ENCOUNTER — ANTI-COAG VISIT (OUTPATIENT)
Dept: PHARMACY | Age: 83
End: 2023-12-01
Payer: MEDICARE

## 2023-12-01 DIAGNOSIS — I48.91 ATRIAL FIBRILLATION, UNSPECIFIED TYPE (HCC): Primary | ICD-10-CM

## 2023-12-01 LAB — INTERNATIONAL NORMALIZATION RATIO, POC: 3.2

## 2023-12-01 PROCEDURE — 99211 OFF/OP EST MAY X REQ PHY/QHP: CPT

## 2023-12-01 PROCEDURE — 85610 PROTHROMBIN TIME: CPT

## 2023-12-01 NOTE — PROGRESS NOTES
Keturah Spear is a 78 y.o. here for warfarin management. Alohkunal [facility] had an INR test today. Results were reviewed and appropriate warfarin management was completed. Patient verifies current warfarin dosing regimen: Yes     Warfarin medication reviewed and updated on the patient 's home medication list: Yes   All other medications reviewed and updated on the patient 's home medication list: Yes     Lab Results   Component Value Date    INR 3.2 12/01/2023    INR 3.2 11/10/2023    INR 3.6 10/13/2023     Patient Findings       Positives:  Change in medications, Change in diet/appetite    Negatives:  Signs/symptoms of thrombosis, Signs/symptoms of bleeding, Change in health, Missed doses, Extra doses, Bruising    Comments:  Cefdanir   Less greens          Anticoagulation Summary  As of 12/1/2023      INR goal:  2.0-3.0   TTR:  75.8 % (3.7 y)   INR used for dosing:  3.2 (12/1/2023)   Warfarin maintenance plan:  3 mg (6 mg x 0.5) every Fri; 6 mg (6 mg x 1) all other days   Weekly warfarin total:  39 mg   Plan last modified:  Shira Ahn RPH (11/10/2023)   Next INR check:  12/22/2023   Priority:  Maintenance   Target end date: Indefinite    Indications    Afib ([address]) [I48.91]                 Anticoagulation Episode Summary       INR check location:  Anticoagulation Clinic    Preferred lab:      Send INR reminders to:  Pierce Chambers MANAGEMENT CLINICAL STAFF    Comments:  FAX Dr. Jody Gomez. Monique Ahn Q 6 weeks          Anticoagulation Care Providers       Provider Role Specialty Phone number    Dinh Ortega MD Referring Cardiology [phone]          There were no vitals taken for this visit. Warfarin assessment / plan:     Appears well  Supra-therapeutic INR. Denies signs and symptoms of bleeding/bruising. Denies extra warfarin doses. Medication changes. Decrease in vitamin K intake. Jovan's INR is a little high today. Diet has changed over the Thanksgiving holiday. Has had less vitamin K greens.   Also

## 2024-02-02 ENCOUNTER — ANTI-COAG VISIT (OUTPATIENT)
Dept: PHARMACY | Age: 84
End: 2024-02-02
Payer: MEDICARE

## 2024-02-02 DIAGNOSIS — I48.21 PERMANENT ATRIAL FIBRILLATION (HCC): Primary | ICD-10-CM

## 2024-02-02 LAB — INTERNATIONAL NORMALIZATION RATIO, POC: 2.2

## 2024-02-02 PROCEDURE — 99211 OFF/OP EST MAY X REQ PHY/QHP: CPT

## 2024-02-02 PROCEDURE — 85610 PROTHROMBIN TIME: CPT

## 2024-02-02 NOTE — PROGRESS NOTES
Jovan Awad is a 83 y.o. here for warfarin management.  Jovan had an INR test today. Results were reviewed and appropriate warfarin management was completed.     Patient verifies current warfarin dosing regimen: Yes     Warfarin medication reviewed and updated on the patient 's home medication list: Yes   All other medications reviewed and updated on the patient 's home medication list: No new medications    Lab Results   Component Value Date    INR 2.2 2024    INR 2.9 2023    INR 3.2 2023     Patient Findings       Positives:  Missed doses, Other complaints    Negatives:  Signs/symptoms of thrombosis, Signs/symptoms of bleeding, Change in health, Change in medications, Change in diet/appetite, Bruising    Comments:  Cefdnir  Missed one dose for procedure          Anticoagulation Summary  As of 2024      INR goal:  2.0-3.0   TTR:  75.9 % (3.8 y)   INR used for dosin.2 (2024)   Warfarin maintenance plan:  3 mg (6 mg x 0.5) every Fri; 6 mg (6 mg x 1) all other days   Weekly warfarin total:  39 mg   Plan last modified:  Harinder Ricci RP (11/10/2023)   Next INR check:  3/15/2024   Priority:  Maintenance   Target end date:  Indefinite    Indications    Afib ([facility]) [I48.91]                 Anticoagulation Episode Summary       INR check location:  Anticoagulation Clinic    Preferred lab:      Send INR reminders to:  [facility] MANAGEMENT CLINICAL STAFF    Comments:  FAX Dr. Rivera Rivas...Q 6 weeks          Anticoagulation Care Providers       Provider Role Specialty Phone number    Rivera Rivas MD Referring Cardiology [phone]          There were no vitals taken for this visit.    Warfarin assessment / plan:     Patient appears well today.      No changes affecting warfarin therapy were noted.   No acute findings with regards to warfarin therapy.  INR today is within goal range.     Instructed to continue the same weekly warfarin dose.    Missed one dose on Monday for procedure.

## 2024-03-08 ENCOUNTER — ANTI-COAG VISIT (OUTPATIENT)
Dept: PHARMACY | Age: 84
End: 2024-03-08
Payer: MEDICARE

## 2024-03-08 DIAGNOSIS — I48.91 ATRIAL FIBRILLATION, UNSPECIFIED TYPE (HCC): Primary | ICD-10-CM

## 2024-03-08 LAB — INTERNATIONAL NORMALIZATION RATIO, POC: 8.1

## 2024-03-08 PROCEDURE — 85610 PROTHROMBIN TIME: CPT

## 2024-03-08 PROCEDURE — 99212 OFFICE O/P EST SF 10 MIN: CPT

## 2024-03-08 RX ORDER — FUROSEMIDE 20 MG/1
20 TABLET ORAL DAILY
COMMUNITY

## 2024-03-08 NOTE — PROGRESS NOTES
Jovan Awad is a 83 y.o. here for warfarin management.  Jovan had an INR test today. Results were reviewed and appropriate warfarin management was completed.     Patient verifies current warfarin dosing regimen: Yes     Warfarin medication reviewed and updated on the patient 's home medication list: Yes   All other medications reviewed and updated on the patient 's home medication list: Yes     Lab Results   Component Value Date    INR 8.1 2024    INR 2.2 2024    INR 2.9 2023     Patient Findings       Positives:  Change in health, Change in alcohol use, Change in medications, Change in diet/appetite    Negatives:  Signs/symptoms of thrombosis, Signs/symptoms of bleeding, Missed doses, Bruising    Comments:  Added Lasix + cefdinir  No longer drinks 2 vodka martinis/day  Decrease in appetite/vitamin K greens-has not been eating salads  Fluid retention                  Anticoagulation Summary  As of 3/8/2024      INR goal:  2.0-3.0   TTR:  74.3 % (3.9 y)   INR used for dosin.1 (3/8/2024)   Warfarin maintenance plan:  3 mg (6 mg x 0.5) every Fri; 6 mg (6 mg x 1) all other days   Weekly warfarin total:  39 mg   Plan last modified:  Harinder Ricci [facility] (11/10/2023)   Next INR check:  3/12/2024   Priority:  Maintenance   Target end date:  Indefinite    Indications    Afib ([facility]) [I48.91]                 Anticoagulation Episode Summary       INR check location:  Anticoagulation Clinic    Preferred lab:      Send INR reminders to:  [facility] MANAGEMENT CLINICAL STAFF    Comments:  FAX Dr. Rivera Riavs...Q 6 weeks          Anticoagulation Care Providers       Provider Role Specialty Phone number    Rivera Rivas MD Referring Cardiology [phone]          There were no vitals taken for this visit.    Warfarin assessment / plan:     Appears well  Supra-therapeutic INR.     Denies extra warfarin doses.     Medication changes.   Recent decrease in appetite.  Decrease in vitamin K intake.  Fluid

## 2024-03-12 ENCOUNTER — ANTI-COAG VISIT (OUTPATIENT)
Dept: PHARMACY | Age: 84
End: 2024-03-12
Payer: MEDICARE

## 2024-03-12 DIAGNOSIS — I48.91 ATRIAL FIBRILLATION, UNSPECIFIED TYPE (HCC): Primary | ICD-10-CM

## 2024-03-12 LAB — INTERNATIONAL NORMALIZATION RATIO, POC: 8

## 2024-03-12 PROCEDURE — 99211 OFF/OP EST MAY X REQ PHY/QHP: CPT

## 2024-03-12 PROCEDURE — 85610 PROTHROMBIN TIME: CPT

## 2024-03-12 RX ORDER — ATORVASTATIN CALCIUM 20 MG/1
20 TABLET, FILM COATED ORAL DAILY
COMMUNITY

## 2024-03-12 RX ORDER — LANOLIN ALCOHOL/MO/W.PET/CERES
400 CREAM (GRAM) TOPICAL DAILY
COMMUNITY

## 2024-03-12 NOTE — PROGRESS NOTES
daily 3-12 thru 3-14-24     Description    Hold warfarin on 3-12 thru 3-14-24  Next INR on 3-15-24  Have a large portion of vitamin K greens daily (spinach/broccoli)    He eats a large salad (airam) with several vegetables every evening. Broccoli/spinach once per week.    2 Vodka martinis every evening    Call with medications changes, especially antibiotics and steroids and including any over-the-counter medications or herbal products.  Call if you stop any medications.    Keep the number of servings of Vitamin K (dark green vegetables) consistent from week to week    Vitamin K greens makes INR go down       Immunization History   Administered Date(s) Administered    COVID-19, MODERNA BLUE border, Primary or Immunocompromised, (age 12y+), IM, 100 mcg/0.5mL 2021, 2021, 2021, 2022    COVID-19, MODERNA, ( formula), (age 12y+), IM, 50mcg/0.5mL 10/03/2023       Orders Placed This Encounter   Procedures    POCT INR     This external order was created through the results console.      No orders of the defined types were placed in this encounter.     Reviewed AVS with patient / caregiver.    Billing Points:  BASIC ASSESSMENT UNCOMPLICATED (including but not limited to: vital signs; physical assessment screening; review of secondary markers like lab results, allergies, home readings; instructions on treatment plan and basic education; assessment of medication list with one med change and compliance) - 2 points       For Pharmacy Admin Tracking Only    Intervention Detail: Adherence Monitorin  Total # of Interventions Recommended: 2  Total # of Interventions Accepted: 2  Time Spent (min): 15

## 2024-03-15 ENCOUNTER — ANTI-COAG VISIT (OUTPATIENT)
Dept: PHARMACY | Age: 84
End: 2024-03-15
Payer: MEDICARE

## 2024-03-15 DIAGNOSIS — I48.91 ATRIAL FIBRILLATION, UNSPECIFIED TYPE (HCC): Primary | ICD-10-CM

## 2024-03-15 LAB — INTERNATIONAL NORMALIZATION RATIO, POC: 4.5

## 2024-03-15 PROCEDURE — 99211 OFF/OP EST MAY X REQ PHY/QHP: CPT

## 2024-03-15 PROCEDURE — 85610 PROTHROMBIN TIME: CPT

## 2024-03-15 NOTE — PROGRESS NOTES
states the swelling has decreased in his leg and hands.  No blood in urine. He is to hold warfarin on 3-15 and 3-16-24.  Decreased his warfarin dosing to 3 mg daily.  Instructed to keep diet consistent. Will see again in 7 days.     Description    Hold warfarin on 3-15 and 3-16-24  NEW DOSE: Take warfarin 3 mg daily  Have a large portion of vitamin K greens daily (spinach/broccoli)    He eats a large salad (airam) with several vegetables every evening. Broccoli/spinach once per week.    2 Vodka martinis every evening    Call with medications changes, especially antibiotics and steroids and including any over-the-counter medications or herbal products.  Call if you stop any medications.    Keep the number of servings of Vitamin K (dark green vegetables) consistent from week to week    Vitamin K greens makes INR go down       Immunization History   Administered Date(s) Administered    COVID-19, MODERNA BLUE border, Primary or Immunocompromised, (age 12y+), IM, 100 mcg/0.5mL 2021, 2021, 2021, 2022    COVID-19, MODERNA, ( formula), (age 12y+), IM, 50mcg/0.5mL 10/03/2023       Orders Placed This Encounter   Procedures    POCT INR     This external order was created through the results console.      No orders of the defined types were placed in this encounter.     Reviewed AVS with patient / caregiver.    Billing Points:  BASIC ASSESSMENT UNCOMPLICATED (including but not limited to: vital signs; physical assessment screening; review of secondary markers like lab results, allergies, home readings; instructions on treatment plan and basic education; assessment of medication list with one med change and compliance) - 2 points     For Pharmacy Admin Tracking Only    Intervention Detail: Adherence Monitorin  Total # of Interventions Recommended: 2  Total # of Interventions Accepted: 2  Time Spent (min): 15

## 2024-03-24 ENCOUNTER — APPOINTMENT (OUTPATIENT)
Dept: CT IMAGING | Age: 84
DRG: 371 | End: 2024-03-24
Payer: MEDICARE

## 2024-03-24 ENCOUNTER — APPOINTMENT (OUTPATIENT)
Dept: GENERAL RADIOLOGY | Age: 84
DRG: 371 | End: 2024-03-24
Payer: MEDICARE

## 2024-03-24 ENCOUNTER — HOSPITAL ENCOUNTER (INPATIENT)
Age: 84
LOS: 10 days | Discharge: NURSING, REHAB OR LONG-TERM CARE | DRG: 371 | End: 2024-04-03
Attending: EMERGENCY MEDICINE | Admitting: FAMILY MEDICINE
Payer: MEDICARE

## 2024-03-24 DIAGNOSIS — R79.89 ELEVATED BRAIN NATRIURETIC PEPTIDE (BNP) LEVEL: ICD-10-CM

## 2024-03-24 DIAGNOSIS — D64.9 ANEMIA, UNSPECIFIED TYPE: ICD-10-CM

## 2024-03-24 DIAGNOSIS — N17.9 AKI (ACUTE KIDNEY INJURY) (HCC): ICD-10-CM

## 2024-03-24 DIAGNOSIS — Z71.89 GOALS OF CARE, COUNSELING/DISCUSSION: ICD-10-CM

## 2024-03-24 DIAGNOSIS — R33.8 ACUTE URINARY RETENTION: ICD-10-CM

## 2024-03-24 DIAGNOSIS — R79.89 ELEVATED TROPONIN: ICD-10-CM

## 2024-03-24 DIAGNOSIS — E87.5 HYPERKALEMIA: Primary | ICD-10-CM

## 2024-03-24 DIAGNOSIS — E86.1 HYPOVOLEMIA: ICD-10-CM

## 2024-03-24 DIAGNOSIS — R79.1 SUPRATHERAPEUTIC INR: ICD-10-CM

## 2024-03-24 DIAGNOSIS — Z78.9 FULL CODE STATUS: ICD-10-CM

## 2024-03-24 LAB
ALBUMIN SERPL-MCNC: 3.2 G/DL (ref 3.4–5)
ALBUMIN/GLOB SERPL: 1.1 {RATIO} (ref 1.1–2.2)
ALP SERPL-CCNC: 69 U/L (ref 40–129)
ALT SERPL-CCNC: 10 U/L (ref 10–40)
ANION GAP SERPL CALCULATED.3IONS-SCNC: 13 MMOL/L (ref 3–16)
ANION GAP SERPL CALCULATED.3IONS-SCNC: 14 MMOL/L (ref 3–16)
ANION GAP SERPL CALCULATED.3IONS-SCNC: 16 MMOL/L (ref 3–16)
AST SERPL-CCNC: 10 U/L (ref 15–37)
BACTERIA URNS QL MICRO: ABNORMAL /HPF
BASOPHILS # BLD: 0 K/UL (ref 0–0.2)
BASOPHILS NFR BLD: 0.4 %
BILIRUB SERPL-MCNC: 0.4 MG/DL (ref 0–1)
BILIRUB UR QL STRIP.AUTO: NEGATIVE
BUN SERPL-MCNC: 82 MG/DL (ref 7–20)
BUN SERPL-MCNC: 84 MG/DL (ref 7–20)
BUN SERPL-MCNC: 86 MG/DL (ref 7–20)
C DIFF TOX A+B STL QL IA: NORMAL
CALCIUM SERPL-MCNC: 8.3 MG/DL (ref 8.3–10.6)
CALCIUM SERPL-MCNC: 8.3 MG/DL (ref 8.3–10.6)
CALCIUM SERPL-MCNC: 8.6 MG/DL (ref 8.3–10.6)
CHLORIDE SERPL-SCNC: 110 MMOL/L (ref 99–110)
CHLORIDE SERPL-SCNC: 112 MMOL/L (ref 99–110)
CHLORIDE SERPL-SCNC: 113 MMOL/L (ref 99–110)
CLARITY UR: ABNORMAL
CO2 SERPL-SCNC: 17 MMOL/L (ref 21–32)
CO2 SERPL-SCNC: 17 MMOL/L (ref 21–32)
CO2 SERPL-SCNC: 19 MMOL/L (ref 21–32)
COLOR UR: YELLOW
CREAT SERPL-MCNC: 7.9 MG/DL (ref 0.8–1.3)
CREAT SERPL-MCNC: 8 MG/DL (ref 0.8–1.3)
CREAT SERPL-MCNC: 8 MG/DL (ref 0.8–1.3)
CREAT UR-MCNC: 126.1 MG/DL (ref 39–259)
DEPRECATED RDW RBC AUTO: 14.3 % (ref 12.4–15.4)
EKG ATRIAL RATE: 61 BPM
EKG DIAGNOSIS: NORMAL
EKG P AXIS: 73 DEGREES
EKG P-R INTERVAL: 198 MS
EKG Q-T INTERVAL: 528 MS
EKG QRS DURATION: 136 MS
EKG QTC CALCULATION (BAZETT): 531 MS
EKG R AXIS: 33 DEGREES
EKG T AXIS: 29 DEGREES
EKG VENTRICULAR RATE: 61 BPM
EOSINOPHIL # BLD: 0.1 K/UL (ref 0–0.6)
EOSINOPHIL NFR BLD: 2.2 %
EPI CELLS #/AREA URNS AUTO: 0 /HPF (ref 0–5)
GFR SERPLBLD CREATININE-BSD FMLA CKD-EPI: 6 ML/MIN/{1.73_M2}
GLUCOSE BLD-MCNC: 100 MG/DL (ref 70–99)
GLUCOSE BLD-MCNC: 127 MG/DL (ref 70–99)
GLUCOSE BLD-MCNC: 134 MG/DL (ref 70–99)
GLUCOSE BLD-MCNC: 153 MG/DL (ref 70–99)
GLUCOSE SERPL-MCNC: 111 MG/DL (ref 70–99)
GLUCOSE SERPL-MCNC: 115 MG/DL (ref 70–99)
GLUCOSE SERPL-MCNC: 152 MG/DL (ref 70–99)
GLUCOSE UR STRIP.AUTO-MCNC: NEGATIVE MG/DL
HCT VFR BLD AUTO: 24.6 % (ref 40.5–52.5)
HEMOCCULT STL QL: NORMAL
HGB BLD-MCNC: 8.2 G/DL (ref 13.5–17.5)
HGB UR QL STRIP.AUTO: ABNORMAL
HYALINE CASTS #/AREA URNS AUTO: 0 /LPF (ref 0–8)
INR PPP: 5.48 (ref 0.84–1.16)
KETONES UR STRIP.AUTO-MCNC: NEGATIVE MG/DL
LEUKOCYTE ESTERASE UR QL STRIP.AUTO: ABNORMAL
LYMPHOCYTES # BLD: 0.5 K/UL (ref 1–5.1)
LYMPHOCYTES NFR BLD: 12.9 %
MAGNESIUM SERPL-MCNC: 1.9 MG/DL (ref 1.8–2.4)
MCH RBC QN AUTO: 32.4 PG (ref 26–34)
MCHC RBC AUTO-ENTMCNC: 33.5 G/DL (ref 31–36)
MCV RBC AUTO: 96.5 FL (ref 80–100)
MICROALBUMIN UR DL<=1MG/L-MCNC: 30.3 MG/DL
MICROALBUMIN/CREAT UR: 240.3 MG/G (ref 0–30)
MONOCYTES # BLD: 0.3 K/UL (ref 0–1.3)
MONOCYTES NFR BLD: 7.7 %
NEUTROPHILS # BLD: 3.1 K/UL (ref 1.7–7.7)
NEUTROPHILS NFR BLD: 76.8 %
NITRITE UR QL STRIP.AUTO: NEGATIVE
NT-PROBNP SERPL-MCNC: ABNORMAL PG/ML (ref 0–449)
PERFORMED ON: ABNORMAL
PH UR STRIP.AUTO: 5 [PH] (ref 5–8)
PLATELET # BLD AUTO: 114 K/UL (ref 135–450)
PMV BLD AUTO: 8 FL (ref 5–10.5)
POTASSIUM SERPL-SCNC: 4.9 MMOL/L (ref 3.5–5.1)
POTASSIUM SERPL-SCNC: 4.9 MMOL/L (ref 3.5–5.1)
POTASSIUM SERPL-SCNC: 5.7 MMOL/L (ref 3.5–5.1)
PROT SERPL-MCNC: 6.2 G/DL (ref 6.4–8.2)
PROT UR STRIP.AUTO-MCNC: 100 MG/DL
PROT UR-MCNC: 0.06 G/DL
PROT UR-MCNC: 56 MG/DL
PROTHROMBIN TIME: 49.3 SEC (ref 11.5–14.8)
RBC # BLD AUTO: 2.55 M/UL (ref 4.2–5.9)
RBC CLUMPS #/AREA URNS AUTO: 44 /HPF (ref 0–4)
SODIUM SERPL-SCNC: 142 MMOL/L (ref 136–145)
SODIUM SERPL-SCNC: 143 MMOL/L (ref 136–145)
SODIUM SERPL-SCNC: 146 MMOL/L (ref 136–145)
SP GR UR STRIP.AUTO: 1.01 (ref 1–1.03)
TROPONIN, HIGH SENSITIVITY: 113 NG/L (ref 0–22)
TROPONIN, HIGH SENSITIVITY: 116 NG/L (ref 0–22)
TROPONIN, HIGH SENSITIVITY: 117 NG/L (ref 0–22)
UA COMPLETE W REFLEX CULTURE PNL UR: ABNORMAL
UA DIPSTICK W REFLEX MICRO PNL UR: YES
URN SPEC COLLECT METH UR: ABNORMAL
UROBILINOGEN UR STRIP-ACNC: 0.2 E.U./DL
WBC # BLD AUTO: 4.1 K/UL (ref 4–11)
WBC #/AREA URNS AUTO: 2 /HPF (ref 0–5)

## 2024-03-24 PROCEDURE — 83880 ASSAY OF NATRIURETIC PEPTIDE: CPT

## 2024-03-24 PROCEDURE — 2500000003 HC RX 250 WO HCPCS: Performed by: NURSE PRACTITIONER

## 2024-03-24 PROCEDURE — 6370000000 HC RX 637 (ALT 250 FOR IP): Performed by: FAMILY MEDICINE

## 2024-03-24 PROCEDURE — 84156 ASSAY OF PROTEIN URINE: CPT

## 2024-03-24 PROCEDURE — 84166 PROTEIN E-PHORESIS/URINE/CSF: CPT

## 2024-03-24 PROCEDURE — 94640 AIRWAY INHALATION TREATMENT: CPT

## 2024-03-24 PROCEDURE — 2580000003 HC RX 258: Performed by: NURSE PRACTITIONER

## 2024-03-24 PROCEDURE — 85610 PROTHROMBIN TIME: CPT

## 2024-03-24 PROCEDURE — 99285 EMERGENCY DEPT VISIT HI MDM: CPT

## 2024-03-24 PROCEDURE — 93010 ELECTROCARDIOGRAM REPORT: CPT | Performed by: INTERNAL MEDICINE

## 2024-03-24 PROCEDURE — 80053 COMPREHEN METABOLIC PANEL: CPT

## 2024-03-24 PROCEDURE — 71250 CT THORAX DX C-: CPT

## 2024-03-24 PROCEDURE — 87493 C DIFF AMPLIFIED PROBE: CPT

## 2024-03-24 PROCEDURE — 82270 OCCULT BLOOD FECES: CPT

## 2024-03-24 PROCEDURE — 82570 ASSAY OF URINE CREATININE: CPT

## 2024-03-24 PROCEDURE — 36415 COLL VENOUS BLD VENIPUNCTURE: CPT

## 2024-03-24 PROCEDURE — 96365 THER/PROPH/DIAG IV INF INIT: CPT

## 2024-03-24 PROCEDURE — 93005 ELECTROCARDIOGRAM TRACING: CPT | Performed by: EMERGENCY MEDICINE

## 2024-03-24 PROCEDURE — 87324 CLOSTRIDIUM AG IA: CPT

## 2024-03-24 PROCEDURE — 2580000003 HC RX 258: Performed by: FAMILY MEDICINE

## 2024-03-24 PROCEDURE — 2060000000 HC ICU INTERMEDIATE R&B

## 2024-03-24 PROCEDURE — 83735 ASSAY OF MAGNESIUM: CPT

## 2024-03-24 PROCEDURE — 84484 ASSAY OF TROPONIN QUANT: CPT

## 2024-03-24 PROCEDURE — 81001 URINALYSIS AUTO W/SCOPE: CPT

## 2024-03-24 PROCEDURE — 82043 UR ALBUMIN QUANTITATIVE: CPT

## 2024-03-24 PROCEDURE — 96375 TX/PRO/DX INJ NEW DRUG ADDON: CPT

## 2024-03-24 PROCEDURE — 85025 COMPLETE CBC W/AUTO DIFF WBC: CPT

## 2024-03-24 PROCEDURE — 94760 N-INVAS EAR/PLS OXIMETRY 1: CPT

## 2024-03-24 PROCEDURE — 87449 NOS EACH ORGANISM AG IA: CPT

## 2024-03-24 PROCEDURE — 71046 X-RAY EXAM CHEST 2 VIEWS: CPT

## 2024-03-24 PROCEDURE — 6360000002 HC RX W HCPCS: Performed by: NURSE PRACTITIONER

## 2024-03-24 PROCEDURE — 6370000000 HC RX 637 (ALT 250 FOR IP): Performed by: NURSE PRACTITIONER

## 2024-03-24 RX ORDER — LEVETIRACETAM 500 MG/1
500 TABLET ORAL 2 TIMES DAILY
Status: DISCONTINUED | OUTPATIENT
Start: 2024-03-24 | End: 2024-03-24

## 2024-03-24 RX ORDER — 0.9 % SODIUM CHLORIDE 0.9 %
500 INTRAVENOUS SOLUTION INTRAVENOUS ONCE
Status: COMPLETED | OUTPATIENT
Start: 2024-03-24 | End: 2024-03-24

## 2024-03-24 RX ORDER — FINASTERIDE 5 MG/1
5 TABLET, FILM COATED ORAL DAILY
Status: DISCONTINUED | OUTPATIENT
Start: 2024-03-24 | End: 2024-03-24

## 2024-03-24 RX ORDER — DEXTROSE MONOHYDRATE 100 MG/ML
INJECTION, SOLUTION INTRAVENOUS CONTINUOUS PRN
Status: DISCONTINUED | OUTPATIENT
Start: 2024-03-24 | End: 2024-04-03 | Stop reason: HOSPADM

## 2024-03-24 RX ORDER — DOFETILIDE 0.25 MG/1
500 CAPSULE ORAL 2 TIMES DAILY
Status: DISCONTINUED | OUTPATIENT
Start: 2024-03-24 | End: 2024-03-25

## 2024-03-24 RX ORDER — SODIUM CHLORIDE 0.9 % (FLUSH) 0.9 %
5-40 SYRINGE (ML) INJECTION PRN
Status: DISCONTINUED | OUTPATIENT
Start: 2024-03-24 | End: 2024-04-03 | Stop reason: HOSPADM

## 2024-03-24 RX ORDER — SODIUM CHLORIDE 0.9 % (FLUSH) 0.9 %
5-40 SYRINGE (ML) INJECTION EVERY 12 HOURS SCHEDULED
Status: DISCONTINUED | OUTPATIENT
Start: 2024-03-24 | End: 2024-04-03 | Stop reason: HOSPADM

## 2024-03-24 RX ORDER — ALBUTEROL SULFATE 2.5 MG/3ML
10 SOLUTION RESPIRATORY (INHALATION) ONCE
Status: COMPLETED | OUTPATIENT
Start: 2024-03-24 | End: 2024-03-24

## 2024-03-24 RX ORDER — LEVOTHYROXINE SODIUM 0.05 MG/1
50 TABLET ORAL DAILY
Status: DISCONTINUED | OUTPATIENT
Start: 2024-03-25 | End: 2024-04-03 | Stop reason: HOSPADM

## 2024-03-24 RX ORDER — LEVETIRACETAM 500 MG/1
500 TABLET ORAL DAILY
Status: DISCONTINUED | OUTPATIENT
Start: 2024-03-25 | End: 2024-04-03 | Stop reason: HOSPADM

## 2024-03-24 RX ORDER — ACETAMINOPHEN 325 MG/1
650 TABLET ORAL EVERY 6 HOURS PRN
Status: DISCONTINUED | OUTPATIENT
Start: 2024-03-24 | End: 2024-04-03 | Stop reason: HOSPADM

## 2024-03-24 RX ORDER — ATORVASTATIN CALCIUM 20 MG/1
20 TABLET, FILM COATED ORAL DAILY
Status: DISCONTINUED | OUTPATIENT
Start: 2024-03-24 | End: 2024-04-03 | Stop reason: HOSPADM

## 2024-03-24 RX ORDER — ACETAMINOPHEN 650 MG/1
650 SUPPOSITORY RECTAL EVERY 6 HOURS PRN
Status: DISCONTINUED | OUTPATIENT
Start: 2024-03-24 | End: 2024-04-03 | Stop reason: HOSPADM

## 2024-03-24 RX ORDER — FINASTERIDE 5 MG/1
5 TABLET, FILM COATED ORAL DAILY
Status: DISCONTINUED | OUTPATIENT
Start: 2024-03-25 | End: 2024-04-03 | Stop reason: HOSPADM

## 2024-03-24 RX ORDER — SODIUM CHLORIDE 9 MG/ML
INJECTION, SOLUTION INTRAVENOUS PRN
Status: DISCONTINUED | OUTPATIENT
Start: 2024-03-24 | End: 2024-04-03 | Stop reason: HOSPADM

## 2024-03-24 RX ORDER — GLUCAGON 1 MG/ML
1 KIT INJECTION PRN
Status: DISCONTINUED | OUTPATIENT
Start: 2024-03-24 | End: 2024-04-03 | Stop reason: HOSPADM

## 2024-03-24 RX ORDER — POLYETHYLENE GLYCOL 3350 17 G/17G
17 POWDER, FOR SOLUTION ORAL DAILY PRN
Status: DISCONTINUED | OUTPATIENT
Start: 2024-03-24 | End: 2024-04-03 | Stop reason: HOSPADM

## 2024-03-24 RX ADMIN — ATORVASTATIN CALCIUM 20 MG: 20 TABLET, FILM COATED ORAL at 20:50

## 2024-03-24 RX ADMIN — SODIUM CHLORIDE, PRESERVATIVE FREE 10 ML: 5 INJECTION INTRAVENOUS at 20:50

## 2024-03-24 RX ADMIN — SODIUM CHLORIDE 500 ML: 9 INJECTION, SOLUTION INTRAVENOUS at 11:42

## 2024-03-24 RX ADMIN — SODIUM BICARBONATE: 84 INJECTION, SOLUTION INTRAVENOUS at 12:30

## 2024-03-24 RX ADMIN — DEXTROSE MONOHYDRATE 250 ML: 100 INJECTION, SOLUTION INTRAVENOUS at 11:48

## 2024-03-24 RX ADMIN — SODIUM BICARBONATE: 84 INJECTION, SOLUTION INTRAVENOUS at 20:47

## 2024-03-24 RX ADMIN — SODIUM BICARBONATE 50 MEQ: 84 INJECTION INTRAVENOUS at 11:43

## 2024-03-24 RX ADMIN — INSULIN HUMAN 10 UNITS: 100 INJECTION, SOLUTION PARENTERAL at 11:42

## 2024-03-24 RX ADMIN — ALBUTEROL SULFATE 10 MG: 2.5 SOLUTION RESPIRATORY (INHALATION) at 11:31

## 2024-03-24 ASSESSMENT — LIFESTYLE VARIABLES
HOW OFTEN DO YOU HAVE A DRINK CONTAINING ALCOHOL: NEVER
HOW MANY STANDARD DRINKS CONTAINING ALCOHOL DO YOU HAVE ON A TYPICAL DAY: PATIENT DOES NOT DRINK

## 2024-03-24 ASSESSMENT — PAIN - FUNCTIONAL ASSESSMENT: PAIN_FUNCTIONAL_ASSESSMENT: NONE - DENIES PAIN

## 2024-03-24 ASSESSMENT — PAIN SCALES - GENERAL: PAINLEVEL_OUTOF10: 0

## 2024-03-24 NOTE — H&P
V2.0  History and Physical      Name:  Jovan Awad /Age/Sex: 1940  (83 y.o. male)   MRN & CSN:  [id number] & [id number] Encounter Date/Time: 3/24/2024 4:33 PM EDT   Location:  [id number] PCP: Bonny Werner PA-C       Hospital Day: 1    Assessment and Plan:   Jovan Awad is a 83 y.o. male  who presents with ARIANA (acute kidney injury) ([facility])    Hospital Problems             Last Modified POA    * (Principal) ARIANA (acute kidney injury) ([facility]) 3/24/2024 Yes       Plan:    ARIANA:  - nephrology consulted from the ED  - start sodium bicarbonate drip  - pharmacy adjusting medications for renal clearance  - hold lasix and lisnipopril    Anion gap metabolic acidosis:  - due to renal failure  - bicarb drip    Hyperkalemia:  - no peaked T waves on ECG  - sodium bicarb drip  - repeat BMP in 4 hrs and reassess for further interventions    Fluid overload  Elevated BNP  Orthopnea  - obtain Echo    Supratherapeutic INR:  - no active signs of bleeding -- ok to hold off on vitamin K for now  - pharmacy to dose coumadin    Arrhythmia:  holding dofetilide for now, pharmacy assisting in adjusting for renal clearance  Seizures:  pharmacy adjusting Keppra dose  Hypothyroidism:  cont synthroid      Disposition:   Current Living situation: home  Expected Disposition: home  Estimated D/C: 3-4 days    Diet ADULT DIET; Regular; Low Potassium (Less than 3000 mg/day)   DVT Prophylaxis [x] Lovenox, []  Heparin, [] SCDs, [] Ambulation,  [] Eliquis, [] Xarelto, [] Coumadin   Code Status Full Code   Surrogate Decision Maker/ POA In  chart     Personally reviewed Lab Studies and Imaging     Discussed management of the case with ED provider who recommended admission to [facility]    Drugs that require monitoring for toxicity include antiarrhythics and the method of monitoring was serial ECG's to assess QTc        History from:     patient    History of Present Illness:     Chief Complaint: fatigue  Jovan Awad is a 83 y.o. male who

## 2024-03-24 NOTE — ED PROVIDER NOTES
ED Attending Attestation Note    I personally saw the patient and made/approved the management plan and take responsibility for patient management.     Briefly, 83 y.o. male with PMH a-fib (anticoag'd on warfarin), HTN, chronic diastolic HF presents with SOB and diarrhea. Pt reports the onset of symptoms about 3-4 weeks ago. Stool nonbloody. Denies chest pain, nausea, vomiting. Dyspnea is with exertion. Still voiding.     Pt followed with Cardiology at [place] 3/5/24 and was seen for dyspnea, was started on lasix 20 mg daily.     Focused exam:   Gen: 83 y.o. male, NAD  HEENT: NCAT, mucus membranes tacky.   CV: RRR  Lungs: CTAB no incr WOB  Abd: soft NTND      Imaging:   XR CHEST (2 VW)   Final Result   Mild left basilar airspace change suspected to represent atelectasis.  Given   acute symptoms, asymmetric edema or developing pneumonitis cannot be excluded.         CT CHEST PULMONARY EMBOLISM W CONTRAST    (Results Pending)        MDM:   This is a generally well-appearing 83-year-old male who presents with complaint of dyspnea on exertion and nearly a month of diarrhea.  He appears hypovolemic.  He is found to have a significant ARIANA with a BUN and creatinine of 84/8.0, respectively.  Hyperkalemic with a potassium of 5.7 however no peaked T waves on EKG. Will administer temporizing meds. Will administer fluids. Nephro consult. Stool studies. Hgb 8.2, no clear source of bleeding. INR pending. Admit to HM.     EKG interpreted independently by myself.   Rate: normal  Rhythm: Normal sinus with sinus arrhythmia   Axis: normal  Intervals:   QTc 531  ST Segments: no acute abnormality  T waves: no acute abnormality  Comparison: no prior   Impression: Normal sinus with sinus arrhythmia and RBBB    For further details of the patient's emergency department visit, please see the advanced practice provider's documentation.    Bari Hall MD     This report has been produced using speech recognition software and 
  1. Hyperkalemia    2. ARIANA (acute kidney injury) (HCC)    3. Anemia, unspecified type    4. Elevated troponin    5. Elevated brain natriuretic peptide (BNP) level    6. Hypovolemia    7. Full code status    8. Acute urinary retention    9. Goals of care, counseling/discussion    10. Supratherapeutic INR        PLAN  Admission to the hospital    (Please note that this note was completed with a voice recognition program.  Every attempt was made to edit the dictations, but inevitably there remain words that are mis-transcribed.)        Gladys Milan, MARY - CNP  03/24/24 155

## 2024-03-25 PROBLEM — I48.0 PAF (PAROXYSMAL ATRIAL FIBRILLATION) (HCC): Status: ACTIVE | Noted: 2020-02-24

## 2024-03-25 PROBLEM — I50.32 CHRONIC DIASTOLIC HEART FAILURE (HCC): Status: ACTIVE | Noted: 2024-03-25

## 2024-03-25 LAB
ABO + RH BLD: NORMAL
ALBUMIN SERPL-MCNC: 2.8 G/DL (ref 3.4–5)
ALBUMIN SERPL-MCNC: 2.9 G/DL (ref 3.4–5)
ALBUMIN/GLOB SERPL: 1.1 {RATIO} (ref 1.1–2.2)
ALP SERPL-CCNC: 69 U/L (ref 40–129)
ALT SERPL-CCNC: 12 U/L (ref 10–40)
ANION GAP SERPL CALCULATED.3IONS-SCNC: 10 MMOL/L (ref 3–16)
ANION GAP SERPL CALCULATED.3IONS-SCNC: 10 MMOL/L (ref 3–16)
ANION GAP SERPL CALCULATED.3IONS-SCNC: 13 MMOL/L (ref 3–16)
ANION GAP SERPL CALCULATED.3IONS-SCNC: 9 MMOL/L (ref 3–16)
AST SERPL-CCNC: 15 U/L (ref 15–37)
BASOPHILS # BLD: 0 K/UL (ref 0–0.2)
BASOPHILS # BLD: 0 K/UL (ref 0–0.2)
BASOPHILS NFR BLD: 0.3 %
BASOPHILS NFR BLD: 0.4 %
BILIRUB SERPL-MCNC: 0.3 MG/DL (ref 0–1)
BLD GP AB SCN SERPL QL: NORMAL
BUN SERPL-MCNC: 80 MG/DL (ref 7–20)
BUN SERPL-MCNC: 83 MG/DL (ref 7–20)
BUN SERPL-MCNC: 87 MG/DL (ref 7–20)
BUN SERPL-MCNC: 88 MG/DL (ref 7–20)
C DIFF TOX GENS STL QL NAA+PROBE: ABNORMAL
CALCIUM SERPL-MCNC: 7.8 MG/DL (ref 8.3–10.6)
CALCIUM SERPL-MCNC: 7.9 MG/DL (ref 8.3–10.6)
CALCIUM SERPL-MCNC: 8 MG/DL (ref 8.3–10.6)
CALCIUM SERPL-MCNC: 8.2 MG/DL (ref 8.3–10.6)
CHLORIDE SERPL-SCNC: 110 MMOL/L (ref 99–110)
CHLORIDE SERPL-SCNC: 111 MMOL/L (ref 99–110)
CHLORIDE SERPL-SCNC: 112 MMOL/L (ref 99–110)
CHLORIDE SERPL-SCNC: 113 MMOL/L (ref 99–110)
CO2 SERPL-SCNC: 18 MMOL/L (ref 21–32)
CO2 SERPL-SCNC: 20 MMOL/L (ref 21–32)
CO2 SERPL-SCNC: 20 MMOL/L (ref 21–32)
CO2 SERPL-SCNC: 21 MMOL/L (ref 21–32)
CREAT SERPL-MCNC: 8 MG/DL (ref 0.8–1.3)
CREAT SERPL-MCNC: 8.2 MG/DL (ref 0.8–1.3)
CREAT SERPL-MCNC: 8.8 MG/DL (ref 0.8–1.3)
CREAT SERPL-MCNC: 9.1 MG/DL (ref 0.8–1.3)
DEPRECATED RDW RBC AUTO: 14 % (ref 12.4–15.4)
DEPRECATED RDW RBC AUTO: 14 % (ref 12.4–15.4)
EOSINOPHIL # BLD: 0.1 K/UL (ref 0–0.6)
EOSINOPHIL # BLD: 0.1 K/UL (ref 0–0.6)
EOSINOPHIL NFR BLD: 1.4 %
EOSINOPHIL NFR BLD: 2.2 %
FERRITIN SERPL IA-MCNC: 251.4 NG/ML (ref 30–400)
FOLATE SERPL-MCNC: 6.8 NG/ML (ref 4.78–24.2)
GFR SERPLBLD CREATININE-BSD FMLA CKD-EPI: 5 ML/MIN/{1.73_M2}
GFR SERPLBLD CREATININE-BSD FMLA CKD-EPI: 5 ML/MIN/{1.73_M2}
GFR SERPLBLD CREATININE-BSD FMLA CKD-EPI: 6 ML/MIN/{1.73_M2}
GFR SERPLBLD CREATININE-BSD FMLA CKD-EPI: 6 ML/MIN/{1.73_M2}
GLUCOSE BLD-MCNC: 107 MG/DL (ref 70–99)
GLUCOSE BLD-MCNC: 122 MG/DL (ref 70–99)
GLUCOSE BLD-MCNC: 146 MG/DL (ref 70–99)
GLUCOSE BLD-MCNC: 150 MG/DL (ref 70–99)
GLUCOSE BLD-MCNC: 152 MG/DL (ref 70–99)
GLUCOSE BLD-MCNC: 44 MG/DL (ref 70–99)
GLUCOSE BLD-MCNC: 46 MG/DL (ref 70–99)
GLUCOSE BLD-MCNC: 72 MG/DL (ref 70–99)
GLUCOSE SERPL-MCNC: 106 MG/DL (ref 70–99)
GLUCOSE SERPL-MCNC: 107 MG/DL (ref 70–99)
GLUCOSE SERPL-MCNC: 108 MG/DL (ref 70–99)
GLUCOSE SERPL-MCNC: 110 MG/DL (ref 70–99)
HAPTOGLOB SERPL-MCNC: 138 MG/DL (ref 30–200)
HAPTOGLOB SERPL-MCNC: 147 MG/DL (ref 30–200)
HCT VFR BLD AUTO: 20.7 % (ref 40.5–52.5)
HCT VFR BLD AUTO: 21 % (ref 40.5–52.5)
HCT VFR BLD AUTO: 22.7 % (ref 40.5–52.5)
HGB BLD-MCNC: 7.1 G/DL (ref 13.5–17.5)
HGB BLD-MCNC: 7.2 G/DL (ref 13.5–17.5)
HGB BLD-MCNC: 7.5 G/DL (ref 13.5–17.5)
INR PPP: 5.86 (ref 0.84–1.16)
IRON SATN MFR SERPL: 17 % (ref 20–50)
IRON SERPL-MCNC: 29 UG/DL (ref 59–158)
LDH SERPL L TO P-CCNC: 233 U/L (ref 100–190)
LYMPHOCYTES # BLD: 0.6 K/UL (ref 1–5.1)
LYMPHOCYTES # BLD: 0.6 K/UL (ref 1–5.1)
LYMPHOCYTES NFR BLD: 10.7 %
LYMPHOCYTES NFR BLD: 12.3 %
MAGNESIUM SERPL-MCNC: 1.9 MG/DL (ref 1.8–2.4)
MCH RBC QN AUTO: 32.2 PG (ref 26–34)
MCH RBC QN AUTO: 32.3 PG (ref 26–34)
MCHC RBC AUTO-ENTMCNC: 34.2 G/DL (ref 31–36)
MCHC RBC AUTO-ENTMCNC: 34.3 G/DL (ref 31–36)
MCV RBC AUTO: 94.1 FL (ref 80–100)
MCV RBC AUTO: 94.4 FL (ref 80–100)
MONOCYTES # BLD: 0.4 K/UL (ref 0–1.3)
MONOCYTES # BLD: 0.5 K/UL (ref 0–1.3)
MONOCYTES NFR BLD: 8.6 %
MONOCYTES NFR BLD: 9.9 %
NEUTROPHILS # BLD: 3.5 K/UL (ref 1.7–7.7)
NEUTROPHILS # BLD: 4.1 K/UL (ref 1.7–7.7)
NEUTROPHILS NFR BLD: 75.2 %
NEUTROPHILS NFR BLD: 79 %
ORGANISM: ABNORMAL
PERFORMED ON: ABNORMAL
PERFORMED ON: NORMAL
PHOSPHATE SERPL-MCNC: 4.8 MG/DL (ref 2.5–4.9)
PHOSPHATE SERPL-MCNC: 4.9 MG/DL (ref 2.5–4.9)
PLATELET # BLD AUTO: 92 K/UL (ref 135–450)
PLATELET # BLD AUTO: 92 K/UL (ref 135–450)
PLATELET BLD QL SMEAR: ABNORMAL
PMV BLD AUTO: 8.2 FL (ref 5–10.5)
PMV BLD AUTO: 8.4 FL (ref 5–10.5)
POTASSIUM SERPL-SCNC: 5.3 MMOL/L (ref 3.5–5.1)
POTASSIUM SERPL-SCNC: 5.7 MMOL/L (ref 3.5–5.1)
POTASSIUM SERPL-SCNC: 5.7 MMOL/L (ref 3.5–5.1)
POTASSIUM SERPL-SCNC: 5.8 MMOL/L (ref 3.5–5.1)
PROT PATTERN UR ELPH-IMP: NORMAL
PROT SERPL-MCNC: 5.6 G/DL (ref 6.4–8.2)
PROTHROMBIN TIME: 52 SEC (ref 11.5–14.8)
RBC # BLD AUTO: 2.2 M/UL (ref 4.2–5.9)
RBC # BLD AUTO: 2.22 M/UL (ref 4.2–5.9)
SLIDE REVIEW: ABNORMAL
SODIUM SERPL-SCNC: 141 MMOL/L (ref 136–145)
SODIUM SERPL-SCNC: 141 MMOL/L (ref 136–145)
SODIUM SERPL-SCNC: 142 MMOL/L (ref 136–145)
SODIUM SERPL-SCNC: 143 MMOL/L (ref 136–145)
TIBC SERPL-MCNC: 171 UG/DL (ref 260–445)
TROPONIN, HIGH SENSITIVITY: 108 NG/L (ref 0–22)
VIT B12 SERPL-MCNC: 477 PG/ML (ref 211–911)
WBC # BLD AUTO: 4.6 K/UL (ref 4–11)
WBC # BLD AUTO: 5.2 K/UL (ref 4–11)

## 2024-03-25 PROCEDURE — 85610 PROTHROMBIN TIME: CPT

## 2024-03-25 PROCEDURE — 9990000010 HC NO CHARGE VISIT: Performed by: PHYSICAL THERAPIST

## 2024-03-25 PROCEDURE — 83540 ASSAY OF IRON: CPT

## 2024-03-25 PROCEDURE — 84155 ASSAY OF PROTEIN SERUM: CPT

## 2024-03-25 PROCEDURE — 83550 IRON BINDING TEST: CPT

## 2024-03-25 PROCEDURE — 82728 ASSAY OF FERRITIN: CPT

## 2024-03-25 PROCEDURE — 84165 PROTEIN E-PHORESIS SERUM: CPT

## 2024-03-25 PROCEDURE — 83521 IG LIGHT CHAINS FREE EACH: CPT

## 2024-03-25 PROCEDURE — 6370000000 HC RX 637 (ALT 250 FOR IP): Performed by: FAMILY MEDICINE

## 2024-03-25 PROCEDURE — 6360000002 HC RX W HCPCS: Performed by: INTERNAL MEDICINE

## 2024-03-25 PROCEDURE — 94760 N-INVAS EAR/PLS OXIMETRY 1: CPT

## 2024-03-25 PROCEDURE — 36415 COLL VENOUS BLD VENIPUNCTURE: CPT

## 2024-03-25 PROCEDURE — 6370000000 HC RX 637 (ALT 250 FOR IP): Performed by: INTERNAL MEDICINE

## 2024-03-25 PROCEDURE — 6370000000 HC RX 637 (ALT 250 FOR IP): Performed by: NURSE PRACTITIONER

## 2024-03-25 PROCEDURE — 83615 LACTATE (LD) (LDH) ENZYME: CPT

## 2024-03-25 PROCEDURE — 85018 HEMOGLOBIN: CPT

## 2024-03-25 PROCEDURE — 83010 ASSAY OF HAPTOGLOBIN QUANT: CPT

## 2024-03-25 PROCEDURE — 2060000000 HC ICU INTERMEDIATE R&B

## 2024-03-25 PROCEDURE — 80053 COMPREHEN METABOLIC PANEL: CPT

## 2024-03-25 PROCEDURE — 85014 HEMATOCRIT: CPT

## 2024-03-25 PROCEDURE — 86901 BLOOD TYPING SEROLOGIC RH(D): CPT

## 2024-03-25 PROCEDURE — 2500000003 HC RX 250 WO HCPCS: Performed by: REGISTERED NURSE

## 2024-03-25 PROCEDURE — 86850 RBC ANTIBODY SCREEN: CPT

## 2024-03-25 PROCEDURE — 84484 ASSAY OF TROPONIN QUANT: CPT

## 2024-03-25 PROCEDURE — 83735 ASSAY OF MAGNESIUM: CPT

## 2024-03-25 PROCEDURE — 85025 COMPLETE CBC W/AUTO DIFF WBC: CPT

## 2024-03-25 PROCEDURE — 6370000000 HC RX 637 (ALT 250 FOR IP): Performed by: PHYSICIAN ASSISTANT

## 2024-03-25 PROCEDURE — 86900 BLOOD TYPING SEROLOGIC ABO: CPT

## 2024-03-25 PROCEDURE — 82607 VITAMIN B-12: CPT

## 2024-03-25 PROCEDURE — 84100 ASSAY OF PHOSPHORUS: CPT

## 2024-03-25 PROCEDURE — 2580000003 HC RX 258: Performed by: FAMILY MEDICINE

## 2024-03-25 PROCEDURE — 82746 ASSAY OF FOLIC ACID SERUM: CPT

## 2024-03-25 RX ORDER — PHYTONADIONE 5 MG/1
5 TABLET ORAL ONCE
Status: COMPLETED | OUTPATIENT
Start: 2024-03-25 | End: 2024-03-25

## 2024-03-25 RX ORDER — CALCIUM GLUCONATE 20 MG/ML
1000 INJECTION, SOLUTION INTRAVENOUS ONCE
Status: COMPLETED | OUTPATIENT
Start: 2024-03-25 | End: 2024-03-25

## 2024-03-25 RX ADMIN — FIDAXOMICIN 200 MG: 200 TABLET, FILM COATED ORAL at 10:52

## 2024-03-25 RX ADMIN — PHYTONADIONE 5 MG: 5 TABLET ORAL at 18:21

## 2024-03-25 RX ADMIN — SODIUM ZIRCONIUM CYCLOSILICATE 5 G: 5 POWDER, FOR SUSPENSION ORAL at 11:46

## 2024-03-25 RX ADMIN — FIDAXOMICIN 200 MG: 200 TABLET, FILM COATED ORAL at 20:58

## 2024-03-25 RX ADMIN — HUMAN INSULIN 10 UNITS: 100 INJECTION, SOLUTION SUBCUTANEOUS at 11:33

## 2024-03-25 RX ADMIN — EPOETIN ALFA-EPBX 20000 UNITS: 10000 INJECTION, SOLUTION INTRAVENOUS; SUBCUTANEOUS at 18:21

## 2024-03-25 RX ADMIN — LEVOTHYROXINE SODIUM 50 MCG: 0.05 TABLET ORAL at 05:36

## 2024-03-25 RX ADMIN — SODIUM CHLORIDE, PRESERVATIVE FREE 10 ML: 5 INJECTION INTRAVENOUS at 21:05

## 2024-03-25 RX ADMIN — PHYTONADIONE 5 MG: 5 TABLET ORAL at 10:53

## 2024-03-25 RX ADMIN — Medication 16 G: at 12:57

## 2024-03-25 RX ADMIN — DEXTROSE MONOHYDRATE 250 ML: 100 INJECTION, SOLUTION INTRAVENOUS at 11:29

## 2024-03-25 RX ADMIN — FINASTERIDE 5 MG: 5 TABLET, FILM COATED ORAL at 10:52

## 2024-03-25 RX ADMIN — LEVETIRACETAM 500 MG: 500 TABLET, FILM COATED ORAL at 10:52

## 2024-03-25 RX ADMIN — ATORVASTATIN CALCIUM 20 MG: 20 TABLET, FILM COATED ORAL at 10:52

## 2024-03-25 RX ADMIN — CALCIUM GLUCONATE 1000 MG: 20 INJECTION, SOLUTION INTRAVENOUS at 02:04

## 2024-03-25 NOTE — CARE COORDINATION
Case Management Assessment  Initial Evaluation    Date/Time of Evaluation: 3/25/2024 3:52 PM  Assessment Completed by: Azalea Corona RN    If patient is discharged prior to next notation, then this note serves as note for discharge by case management.    Patient Name: Jovan Awad                   YOB: 1940  Diagnosis: Hypovolemia [E86.1]  Hyperkalemia [E87.5]  Elevated troponin [R79.89]  ARIANA (acute kidney injury) (HCC) [N17.9]  Goals of care, counseling/discussion [Z71.89]  Acute urinary retention [R33.8]  Elevated brain natriuretic peptide (BNP) level [R79.89]  Supratherapeutic INR [R79.1]  Full code status [Z78.9]  Anemia, unspecified type [D64.9]                   Date / Time: 3/24/2024  9:34 AM    Patient Admission Status: Inpatient   Readmission Risk (Low < 19, Mod (19-27), High > 27): Readmission Risk Score: 17.9    Current PCP: Bonny Werner PA-C  PCP verified by CM? Yes    Chart Reviewed: Yes      History Provided by: Physician, Medical Record  Patient Orientation: Alert and Oriented    Patient Cognition: Alert    Hospitalization in the last 30 days (Readmission):  No    If yes, Readmission Assessment in CM Navigator will be completed.    Advance Directives:      Code Status: Full Code   Patient's Primary Decision Maker is: Legal Next of Kin    Primary Decision Maker: Goyo Awad - Child - [phone]    Discharge Planning:    Patient lives with: Alone Type of Home: Apartment  Primary Care Giver: Self  Patient Support Systems include: Children, Friends/Neighbors   Current Financial resources: Medicare  Current community resources: None  Current services prior to admission: Durable Medical Equipment            Current DME: Cane            Type of Home Care services:  None    ADLS  Prior functional level: Independent in ADLs/IADLs  Current functional level: Assistance with the following:, Mobility, Toileting    PT AM-PAC:  pending /24  OT AM-PAC:  pending /24    Family can provide

## 2024-03-25 NOTE — ACP (ADVANCE CARE PLANNING)
Advance Care Planning     Advance Care Planning Activator (Inpatient)  Conversation Note      Date of ACP Conversation: 3/25/2024     Conversation Conducted with: Patient with Decision Making Capacity    ACP Activator: Azalea Corona RN    Health Care Decision Maker:     Current Designated Health Care Decision Maker:     Primary Decision Maker: Goyo Awad - Child - [phone]    Today we documented Decision Maker(s) consistent with Legal Next of Kin hierarchy.    Care Preferences    Ventilation:  \"If you were in your present state of health and suddenly became very ill and were unable to breathe on your own, what would your preference be about the use of a ventilator (breathing machine) if it were available to you?\"      Would the patient desire the use of ventilator (breathing machine)?: yes    \"If your health worsens and it becomes clear that your chance of recovery is unlikely, what would your preference be about the use of a ventilator (breathing machine) if it were available to you?\"     Would the patient desire the use of ventilator (breathing machine)?: unsure      Resuscitation  \"CPR works best to restart the heart when there is a sudden event, like a heart attack, in someone who is otherwise healthy. Unfortunately, CPR does not typically restart the heart for people who have serious health conditions or who are very sick.\"    \"In the event your heart stopped as a result of an underlying serious health condition, would you want attempts to be made to restart your heart (answer \"yes\" for attempt to resuscitate) or would you prefer a natural death (answer \"no\" for do not attempt to resuscitate)?\" yes       [] Yes   [] No   Educated Patient / Decision Maker regarding differences between Advance Directives and portable DNR orders.    Length of ACP Conversation in minutes:      Conversation Outcomes:  ACP discussion completed    Follow-up plan:    [] Schedule follow-up conversation to continue

## 2024-03-26 ENCOUNTER — APPOINTMENT (OUTPATIENT)
Dept: INTERVENTIONAL RADIOLOGY/VASCULAR | Age: 84
DRG: 371 | End: 2024-03-26
Payer: MEDICARE

## 2024-03-26 LAB
ANION GAP SERPL CALCULATED.3IONS-SCNC: 12 MMOL/L (ref 3–16)
BASOPHILS # BLD: 0 K/UL (ref 0–0.2)
BASOPHILS NFR BLD: 0.2 %
BUN SERPL-MCNC: 80 MG/DL (ref 7–20)
CALCIUM SERPL-MCNC: 7.9 MG/DL (ref 8.3–10.6)
CHLORIDE SERPL-SCNC: 108 MMOL/L (ref 99–110)
CO2 SERPL-SCNC: 19 MMOL/L (ref 21–32)
CREAT SERPL-MCNC: 8.3 MG/DL (ref 0.8–1.3)
DEPRECATED RDW RBC AUTO: 14.2 % (ref 12.4–15.4)
EOSINOPHIL # BLD: 0.1 K/UL (ref 0–0.6)
EOSINOPHIL NFR BLD: 3 %
GFR SERPLBLD CREATININE-BSD FMLA CKD-EPI: 6 ML/MIN/{1.73_M2}
GLUCOSE BLD-MCNC: 103 MG/DL (ref 70–99)
GLUCOSE BLD-MCNC: 116 MG/DL (ref 70–99)
GLUCOSE BLD-MCNC: 120 MG/DL (ref 70–99)
GLUCOSE SERPL-MCNC: 114 MG/DL (ref 70–99)
HAV IGM SERPL QL IA: NORMAL
HBV CORE IGM SERPL QL IA: NORMAL
HBV SURFACE AB SERPL IA-ACNC: 546.6 MIU/ML
HBV SURFACE AG SERPL QL IA: NORMAL
HCT VFR BLD AUTO: 22 % (ref 40.5–52.5)
HCV AB SERPL QL IA: NORMAL
HGB BLD-MCNC: 7.4 G/DL (ref 13.5–17.5)
INR PPP: 2.34 (ref 0.84–1.16)
LYMPHOCYTES # BLD: 0.5 K/UL (ref 1–5.1)
LYMPHOCYTES NFR BLD: 11.5 %
MCH RBC QN AUTO: 31.8 PG (ref 26–34)
MCHC RBC AUTO-ENTMCNC: 33.7 G/DL (ref 31–36)
MCV RBC AUTO: 94.5 FL (ref 80–100)
MONOCYTES # BLD: 0.4 K/UL (ref 0–1.3)
MONOCYTES NFR BLD: 9.7 %
NEUTROPHILS # BLD: 3.3 K/UL (ref 1.7–7.7)
NEUTROPHILS NFR BLD: 75.6 %
PERFORMED ON: ABNORMAL
PLATELET # BLD AUTO: 84 K/UL (ref 135–450)
PMV BLD AUTO: 8.4 FL (ref 5–10.5)
POTASSIUM SERPL-SCNC: 5.9 MMOL/L (ref 3.5–5.1)
PROTHROMBIN TIME: 25.5 SEC (ref 11.5–14.8)
RBC # BLD AUTO: 2.33 M/UL (ref 4.2–5.9)
SODIUM SERPL-SCNC: 139 MMOL/L (ref 136–145)
WBC # BLD AUTO: 4.3 K/UL (ref 4–11)

## 2024-03-26 PROCEDURE — 6360000002 HC RX W HCPCS: Performed by: FAMILY MEDICINE

## 2024-03-26 PROCEDURE — 93306 TTE W/DOPPLER COMPLETE: CPT

## 2024-03-26 PROCEDURE — 97530 THERAPEUTIC ACTIVITIES: CPT

## 2024-03-26 PROCEDURE — 2060000000 HC ICU INTERMEDIATE R&B

## 2024-03-26 PROCEDURE — 86704 HEP B CORE ANTIBODY TOTAL: CPT

## 2024-03-26 PROCEDURE — 84300 ASSAY OF URINE SODIUM: CPT

## 2024-03-26 PROCEDURE — 6360000002 HC RX W HCPCS: Performed by: INTERNAL MEDICINE

## 2024-03-26 PROCEDURE — 97116 GAIT TRAINING THERAPY: CPT

## 2024-03-26 PROCEDURE — 36556 INSERT NON-TUNNEL CV CATH: CPT

## 2024-03-26 PROCEDURE — 80074 ACUTE HEPATITIS PANEL: CPT

## 2024-03-26 PROCEDURE — 80048 BASIC METABOLIC PNL TOTAL CA: CPT

## 2024-03-26 PROCEDURE — 86706 HEP B SURFACE ANTIBODY: CPT

## 2024-03-26 PROCEDURE — 97162 PT EVAL MOD COMPLEX 30 MIN: CPT

## 2024-03-26 PROCEDURE — 94760 N-INVAS EAR/PLS OXIMETRY 1: CPT

## 2024-03-26 PROCEDURE — 2709999900 IR NONTUNNELED VASCULAR CATHETER > 5 YEARS

## 2024-03-26 PROCEDURE — 87086 URINE CULTURE/COLONY COUNT: CPT

## 2024-03-26 PROCEDURE — 97166 OT EVAL MOD COMPLEX 45 MIN: CPT

## 2024-03-26 PROCEDURE — 36415 COLL VENOUS BLD VENIPUNCTURE: CPT

## 2024-03-26 PROCEDURE — 90935 HEMODIALYSIS ONE EVALUATION: CPT

## 2024-03-26 PROCEDURE — 77001 FLUOROGUIDE FOR VEIN DEVICE: CPT

## 2024-03-26 PROCEDURE — 6370000000 HC RX 637 (ALT 250 FOR IP): Performed by: PHYSICIAN ASSISTANT

## 2024-03-26 PROCEDURE — 2580000003 HC RX 258: Performed by: FAMILY MEDICINE

## 2024-03-26 PROCEDURE — 97535 SELF CARE MNGMENT TRAINING: CPT

## 2024-03-26 PROCEDURE — 76937 US GUIDE VASCULAR ACCESS: CPT

## 2024-03-26 PROCEDURE — 6370000000 HC RX 637 (ALT 250 FOR IP): Performed by: FAMILY MEDICINE

## 2024-03-26 PROCEDURE — 82436 ASSAY OF URINE CHLORIDE: CPT

## 2024-03-26 PROCEDURE — 85610 PROTHROMBIN TIME: CPT

## 2024-03-26 PROCEDURE — 85025 COMPLETE CBC W/AUTO DIFF WBC: CPT

## 2024-03-26 PROCEDURE — 84133 ASSAY OF URINE POTASSIUM: CPT

## 2024-03-26 PROCEDURE — 02HV33Z INSERTION OF INFUSION DEVICE INTO SUPERIOR VENA CAVA, PERCUTANEOUS APPROACH: ICD-10-PCS | Performed by: STUDENT IN AN ORGANIZED HEALTH CARE EDUCATION/TRAINING PROGRAM

## 2024-03-26 RX ORDER — HEPARIN SODIUM 1000 [USP'U]/ML
2400 INJECTION, SOLUTION INTRAVENOUS; SUBCUTANEOUS PRN
Status: DISCONTINUED | OUTPATIENT
Start: 2024-03-26 | End: 2024-04-03 | Stop reason: HOSPADM

## 2024-03-26 RX ORDER — HYDRALAZINE HYDROCHLORIDE 20 MG/ML
5 INJECTION INTRAMUSCULAR; INTRAVENOUS EVERY 6 HOURS PRN
Status: DISCONTINUED | OUTPATIENT
Start: 2024-03-26 | End: 2024-04-03 | Stop reason: HOSPADM

## 2024-03-26 RX ADMIN — SODIUM CHLORIDE, PRESERVATIVE FREE 10 ML: 5 INJECTION INTRAVENOUS at 22:05

## 2024-03-26 RX ADMIN — LEVETIRACETAM 500 MG: 500 TABLET, FILM COATED ORAL at 09:40

## 2024-03-26 RX ADMIN — HYDRALAZINE HYDROCHLORIDE 5 MG: 20 INJECTION, SOLUTION INTRAMUSCULAR; INTRAVENOUS at 17:58

## 2024-03-26 RX ADMIN — FINASTERIDE 5 MG: 5 TABLET, FILM COATED ORAL at 09:40

## 2024-03-26 RX ADMIN — FIDAXOMICIN 200 MG: 200 TABLET, FILM COATED ORAL at 22:05

## 2024-03-26 RX ADMIN — ATORVASTATIN CALCIUM 20 MG: 20 TABLET, FILM COATED ORAL at 09:40

## 2024-03-26 RX ADMIN — SODIUM ZIRCONIUM CYCLOSILICATE 5 G: 5 POWDER, FOR SUSPENSION ORAL at 10:39

## 2024-03-26 RX ADMIN — EPOETIN ALFA-EPBX 20000 UNITS: 20000 INJECTION, SOLUTION INTRAVENOUS; SUBCUTANEOUS at 17:51

## 2024-03-26 RX ADMIN — HEPARIN SODIUM 2400 UNITS: 1000 INJECTION INTRAVENOUS; SUBCUTANEOUS at 17:04

## 2024-03-26 RX ADMIN — SODIUM CHLORIDE, PRESERVATIVE FREE 10 ML: 5 INJECTION INTRAVENOUS at 09:41

## 2024-03-26 RX ADMIN — LEVOTHYROXINE SODIUM 50 MCG: 0.05 TABLET ORAL at 05:08

## 2024-03-26 RX ADMIN — FIDAXOMICIN 200 MG: 200 TABLET, FILM COATED ORAL at 09:40

## 2024-03-27 LAB
ALBUMIN SERPL ELPH-MCNC: 1.9 G/DL (ref 3.1–4.9)
ALBUMIN SERPL-MCNC: 3.2 G/DL (ref 3.4–5)
ALPHA1 GLOB SERPL ELPH-MCNC: 0.4 G/DL (ref 0.2–0.4)
ALPHA2 GLOB SERPL ELPH-MCNC: 0.7 G/DL (ref 0.4–1.1)
ANION GAP SERPL CALCULATED.3IONS-SCNC: 11 MMOL/L (ref 3–16)
ANION GAP SERPL CALCULATED.3IONS-SCNC: 13 MMOL/L (ref 3–16)
B-GLOBULIN SERPL ELPH-MCNC: 1 G/DL (ref 0.9–1.6)
BACTERIA UR CULT: NORMAL
BASOPHILS # BLD: 0 K/UL (ref 0–0.2)
BASOPHILS NFR BLD: 0.4 %
BUN SERPL-MCNC: 59 MG/DL (ref 7–20)
BUN SERPL-MCNC: 61 MG/DL (ref 7–20)
CALCIUM SERPL-MCNC: 8.1 MG/DL (ref 8.3–10.6)
CALCIUM SERPL-MCNC: 8.3 MG/DL (ref 8.3–10.6)
CHLORIDE SERPL-SCNC: 106 MMOL/L (ref 99–110)
CHLORIDE SERPL-SCNC: 106 MMOL/L (ref 99–110)
CO2 SERPL-SCNC: 21 MMOL/L (ref 21–32)
CO2 SERPL-SCNC: 22 MMOL/L (ref 21–32)
CREAT SERPL-MCNC: 6.7 MG/DL (ref 0.8–1.3)
CREAT SERPL-MCNC: 7.3 MG/DL (ref 0.8–1.3)
DEPRECATED RDW RBC AUTO: 14.2 % (ref 12.4–15.4)
EOSINOPHIL # BLD: 0.2 K/UL (ref 0–0.6)
EOSINOPHIL NFR BLD: 3.2 %
GAMMA GLOB SERPL ELPH-MCNC: 1.5 G/DL (ref 0.6–1.8)
GFR SERPLBLD CREATININE-BSD FMLA CKD-EPI: 7 ML/MIN/{1.73_M2}
GFR SERPLBLD CREATININE-BSD FMLA CKD-EPI: 7 ML/MIN/{1.73_M2}
GLUCOSE BLD-MCNC: 117 MG/DL (ref 70–99)
GLUCOSE BLD-MCNC: 139 MG/DL (ref 70–99)
GLUCOSE BLD-MCNC: 140 MG/DL (ref 70–99)
GLUCOSE BLD-MCNC: 143 MG/DL (ref 70–99)
GLUCOSE SERPL-MCNC: 104 MG/DL (ref 70–99)
GLUCOSE SERPL-MCNC: 129 MG/DL (ref 70–99)
HBV CORE AB SERPL QL IA: POSITIVE
HCT VFR BLD AUTO: 25.2 % (ref 40.5–52.5)
HGB BLD-MCNC: 8.6 G/DL (ref 13.5–17.5)
INR PPP: 1.56 (ref 0.84–1.16)
KAPPA LC FREE SER-MCNC: 176.22 MG/L (ref 3.3–19.4)
KAPPA LC FREE/LAMBDA FREE SER: 2.65 {RATIO} (ref 0.26–1.65)
LAMBDA LC FREE SERPL-MCNC: 66.53 MG/L (ref 5.71–26.3)
LYMPHOCYTES # BLD: 0.7 K/UL (ref 1–5.1)
LYMPHOCYTES NFR BLD: 12.6 %
MCH RBC QN AUTO: 32.3 PG (ref 26–34)
MCHC RBC AUTO-ENTMCNC: 34.3 G/DL (ref 31–36)
MCV RBC AUTO: 94.2 FL (ref 80–100)
MONOCYTES # BLD: 0.6 K/UL (ref 0–1.3)
MONOCYTES NFR BLD: 11.9 %
NEUTROPHILS # BLD: 3.9 K/UL (ref 1.7–7.7)
NEUTROPHILS NFR BLD: 71.9 %
PERFORMED ON: ABNORMAL
PHOSPHATE SERPL-MCNC: 4.3 MG/DL (ref 2.5–4.9)
PLATELET # BLD AUTO: 102 K/UL (ref 135–450)
PMV BLD AUTO: 8.4 FL (ref 5–10.5)
POTASSIUM SERPL-SCNC: 4.6 MMOL/L (ref 3.5–5.1)
POTASSIUM SERPL-SCNC: 4.9 MMOL/L (ref 3.5–5.1)
POTASSIUM SERPL-SCNC: 4.9 MMOL/L (ref 3.5–5.1)
PROT SERPL-MCNC: 5.4 G/DL (ref 6.4–8.2)
PROTHROMBIN TIME: 18.6 SEC (ref 11.5–14.8)
RBC # BLD AUTO: 2.67 M/UL (ref 4.2–5.9)
RPT COMMENT: ABNORMAL
SODIUM SERPL-SCNC: 139 MMOL/L (ref 136–145)
SODIUM SERPL-SCNC: 140 MMOL/L (ref 136–145)
SPE/IFE INTERPRETATION: NORMAL
WBC # BLD AUTO: 5.4 K/UL (ref 4–11)

## 2024-03-27 PROCEDURE — 2060000000 HC ICU INTERMEDIATE R&B

## 2024-03-27 PROCEDURE — 97116 GAIT TRAINING THERAPY: CPT

## 2024-03-27 PROCEDURE — 5A1D70Z PERFORMANCE OF URINARY FILTRATION, INTERMITTENT, LESS THAN 6 HOURS PER DAY: ICD-10-PCS | Performed by: STUDENT IN AN ORGANIZED HEALTH CARE EDUCATION/TRAINING PROGRAM

## 2024-03-27 PROCEDURE — 2580000003 HC RX 258: Performed by: FAMILY MEDICINE

## 2024-03-27 PROCEDURE — 36415 COLL VENOUS BLD VENIPUNCTURE: CPT

## 2024-03-27 PROCEDURE — 85610 PROTHROMBIN TIME: CPT

## 2024-03-27 PROCEDURE — 85025 COMPLETE CBC W/AUTO DIFF WBC: CPT

## 2024-03-27 PROCEDURE — 6360000002 HC RX W HCPCS: Performed by: INTERNAL MEDICINE

## 2024-03-27 PROCEDURE — 97530 THERAPEUTIC ACTIVITIES: CPT

## 2024-03-27 PROCEDURE — 94760 N-INVAS EAR/PLS OXIMETRY 1: CPT

## 2024-03-27 PROCEDURE — 90935 HEMODIALYSIS ONE EVALUATION: CPT

## 2024-03-27 PROCEDURE — 6370000000 HC RX 637 (ALT 250 FOR IP): Performed by: FAMILY MEDICINE

## 2024-03-27 PROCEDURE — 6370000000 HC RX 637 (ALT 250 FOR IP): Performed by: PHYSICIAN ASSISTANT

## 2024-03-27 PROCEDURE — 80069 RENAL FUNCTION PANEL: CPT

## 2024-03-27 RX ADMIN — FIDAXOMICIN 200 MG: 200 TABLET, FILM COATED ORAL at 21:01

## 2024-03-27 RX ADMIN — FIDAXOMICIN 200 MG: 200 TABLET, FILM COATED ORAL at 17:49

## 2024-03-27 RX ADMIN — HEPARIN SODIUM 2400 UNITS: 1000 INJECTION INTRAVENOUS; SUBCUTANEOUS at 17:20

## 2024-03-27 RX ADMIN — FINASTERIDE 5 MG: 5 TABLET, FILM COATED ORAL at 17:49

## 2024-03-27 RX ADMIN — SODIUM CHLORIDE, PRESERVATIVE FREE 10 ML: 5 INJECTION INTRAVENOUS at 10:12

## 2024-03-27 RX ADMIN — LEVETIRACETAM 500 MG: 500 TABLET, FILM COATED ORAL at 17:50

## 2024-03-27 RX ADMIN — SODIUM CHLORIDE, PRESERVATIVE FREE 10 ML: 5 INJECTION INTRAVENOUS at 21:01

## 2024-03-27 RX ADMIN — ATORVASTATIN CALCIUM 20 MG: 20 TABLET, FILM COATED ORAL at 17:50

## 2024-03-27 RX ADMIN — LEVOTHYROXINE SODIUM 50 MCG: 0.05 TABLET ORAL at 05:09

## 2024-03-27 NOTE — CARE COORDINATION
DISCHARGE PLANNING:    Attempted to speak to patient at bedside regarding discharge planning.  Patient is currently in dialysis at this time. Skilled nursing facility is being recommended at this time.  Patient is from home alone with limited family support.  Patient will possibly need outpatient HD treatment, awaiting final nephrology recs.  IF this is needed a TDC will need to be placed next week per nephrology.  All labs and tests in place to initiate the dialysis portal when necessary.  Will follow up with patient as time allows.    #[phone]  Electronically signed by Azalea Corona RN on 3/27/2024 at 1:44 PM

## 2024-03-28 LAB
ALBUMIN SERPL-MCNC: 2.8 G/DL (ref 3.4–5)
ANION GAP SERPL CALCULATED.3IONS-SCNC: 10 MMOL/L (ref 3–16)
ANION GAP SERPL CALCULATED.3IONS-SCNC: 9 MMOL/L (ref 3–16)
BASOPHILS # BLD: 0 K/UL (ref 0–0.2)
BASOPHILS NFR BLD: 0.3 %
BUN SERPL-MCNC: 32 MG/DL (ref 7–20)
BUN SERPL-MCNC: 35 MG/DL (ref 7–20)
CALCIUM SERPL-MCNC: 7.9 MG/DL (ref 8.3–10.6)
CALCIUM SERPL-MCNC: 8.2 MG/DL (ref 8.3–10.6)
CHLORIDE SERPL-SCNC: 105 MMOL/L (ref 99–110)
CHLORIDE SERPL-SCNC: 107 MMOL/L (ref 99–110)
CO2 SERPL-SCNC: 22 MMOL/L (ref 21–32)
CO2 SERPL-SCNC: 23 MMOL/L (ref 21–32)
CREAT SERPL-MCNC: 5.1 MG/DL (ref 0.8–1.3)
CREAT SERPL-MCNC: 5.6 MG/DL (ref 0.8–1.3)
DEPRECATED RDW RBC AUTO: 13.8 % (ref 12.4–15.4)
EOSINOPHIL # BLD: 0.2 K/UL (ref 0–0.6)
EOSINOPHIL NFR BLD: 4.1 %
GFR SERPLBLD CREATININE-BSD FMLA CKD-EPI: 10 ML/MIN/{1.73_M2}
GFR SERPLBLD CREATININE-BSD FMLA CKD-EPI: 9 ML/MIN/{1.73_M2}
GLUCOSE BLD-MCNC: 107 MG/DL (ref 70–99)
GLUCOSE BLD-MCNC: 142 MG/DL (ref 70–99)
GLUCOSE BLD-MCNC: 146 MG/DL (ref 70–99)
GLUCOSE BLD-MCNC: 169 MG/DL (ref 70–99)
GLUCOSE SERPL-MCNC: 105 MG/DL (ref 70–99)
GLUCOSE SERPL-MCNC: 144 MG/DL (ref 70–99)
HCT VFR BLD AUTO: 22.6 % (ref 40.5–52.5)
HGB BLD-MCNC: 7.8 G/DL (ref 13.5–17.5)
INR PPP: 1.54 (ref 0.84–1.16)
LYMPHOCYTES # BLD: 0.6 K/UL (ref 1–5.1)
LYMPHOCYTES NFR BLD: 11.2 %
MCH RBC QN AUTO: 32.4 PG (ref 26–34)
MCHC RBC AUTO-ENTMCNC: 34.7 G/DL (ref 31–36)
MCV RBC AUTO: 93.4 FL (ref 80–100)
MONOCYTES # BLD: 0.7 K/UL (ref 0–1.3)
MONOCYTES NFR BLD: 11.7 %
NEUTROPHILS # BLD: 4.1 K/UL (ref 1.7–7.7)
NEUTROPHILS NFR BLD: 72.7 %
PERFORMED ON: ABNORMAL
PHOSPHATE SERPL-MCNC: 3.1 MG/DL (ref 2.5–4.9)
PLATELET # BLD AUTO: 105 K/UL (ref 135–450)
PMV BLD AUTO: 8.1 FL (ref 5–10.5)
POTASSIUM SERPL-SCNC: 4.4 MMOL/L (ref 3.5–5.1)
PROTHROMBIN TIME: 18.5 SEC (ref 11.5–14.8)
RBC # BLD AUTO: 2.42 M/UL (ref 4.2–5.9)
SODIUM SERPL-SCNC: 138 MMOL/L (ref 136–145)
SODIUM SERPL-SCNC: 138 MMOL/L (ref 136–145)
WBC # BLD AUTO: 5.6 K/UL (ref 4–11)

## 2024-03-28 PROCEDURE — 85025 COMPLETE CBC W/AUTO DIFF WBC: CPT

## 2024-03-28 PROCEDURE — 97530 THERAPEUTIC ACTIVITIES: CPT

## 2024-03-28 PROCEDURE — 6370000000 HC RX 637 (ALT 250 FOR IP): Performed by: PHYSICIAN ASSISTANT

## 2024-03-28 PROCEDURE — 2060000000 HC ICU INTERMEDIATE R&B

## 2024-03-28 PROCEDURE — 97116 GAIT TRAINING THERAPY: CPT

## 2024-03-28 PROCEDURE — 2580000003 HC RX 258: Performed by: FAMILY MEDICINE

## 2024-03-28 PROCEDURE — 51702 INSERT TEMP BLADDER CATH: CPT

## 2024-03-28 PROCEDURE — 36415 COLL VENOUS BLD VENIPUNCTURE: CPT

## 2024-03-28 PROCEDURE — 85610 PROTHROMBIN TIME: CPT

## 2024-03-28 PROCEDURE — 94760 N-INVAS EAR/PLS OXIMETRY 1: CPT

## 2024-03-28 PROCEDURE — 6370000000 HC RX 637 (ALT 250 FOR IP): Performed by: FAMILY MEDICINE

## 2024-03-28 PROCEDURE — 97535 SELF CARE MNGMENT TRAINING: CPT

## 2024-03-28 PROCEDURE — 80069 RENAL FUNCTION PANEL: CPT

## 2024-03-28 RX ADMIN — FINASTERIDE 5 MG: 5 TABLET, FILM COATED ORAL at 10:11

## 2024-03-28 RX ADMIN — LEVETIRACETAM 500 MG: 500 TABLET, FILM COATED ORAL at 10:10

## 2024-03-28 RX ADMIN — SODIUM CHLORIDE, PRESERVATIVE FREE 10 ML: 5 INJECTION INTRAVENOUS at 10:10

## 2024-03-28 RX ADMIN — FIDAXOMICIN 200 MG: 200 TABLET, FILM COATED ORAL at 21:39

## 2024-03-28 RX ADMIN — FIDAXOMICIN 200 MG: 200 TABLET, FILM COATED ORAL at 10:13

## 2024-03-28 RX ADMIN — LEVOTHYROXINE SODIUM 50 MCG: 0.05 TABLET ORAL at 05:08

## 2024-03-28 RX ADMIN — ATORVASTATIN CALCIUM 20 MG: 20 TABLET, FILM COATED ORAL at 10:10

## 2024-03-28 RX ADMIN — SODIUM CHLORIDE, PRESERVATIVE FREE 10 ML: 5 INJECTION INTRAVENOUS at 21:39

## 2024-03-28 NOTE — CONSULTS
Clinical Pharmacy Note  Warfarin Consult    Jovan Awad is a 83 y.o. male receiving warfarin managed by pharmacy.  Patient being bridged with: none    Warfarin Indication: AFIB  Target INR range: 2-3  Dose prior to admission: 3 mg po daily    Current warfarin drug-drug interactions: none of clinical significance    Recent Labs     03/24/24  0951   HGB 8.2*   HCT 24.6*   INR 5.48*       Assessment/Plan:  Hold Warfarin tonight. Daily PT/INR until stable within therapeutic range.     Thank you for the consult.  Will continue to follow.     Leroy Luque [facility], PharmD, BCPS  3/24/2024 2:16 PM    
Renal Cons dictated    Pt seen and examined in ER   Kelvin Most likely volume depletion / Meds( ACE-I, lasix ) Obstruction .   Met acidosis / hyperkalemia Sec to above .      Plan   Hold meds .   Place avendaño cath .   Continue with iv bicarb   High K txed  .  Ct Abd p   .      No acute indication to start RRT.  If no change or decline he will need dialysis  .        Destiney Flores MD,FACP .  
breath.  Does have some dyspnea on exertion.  Had some abdominal distention and abdominal pain.    PHYSICAL EXAMINATION:  GENERAL:  Lying in bed, looks comfortable.  VITAL SIGNS:  Blood pressure 135/52, pulse 60, temperature 97.5.  HEENT:  Pupils reactive to light.  CHEST:  Decreased breath sound at both bases.  CVS:  S1, S2 audible.  Irregular rate and rhythm.  ABDOMEN:  Soft, distended.  Positive bowel sounds.  EXTREMITIES:  Edema noted.  CNS:  Nonfocal.    LABORATORY DATA:  Sodium is 143, potassium 5.7, chloride 112, bicarb 17, BUN 84, creatinine 8.0.  WBC 4.1, hemoglobin 8.2.    IMPRESSION:    1. Acute kidney injury, most likely secondary to:     a. Severe dehydration.     b. Lisinopril.     c. Obstructive uropathy.     d. Rule out urinary tract infection.  2. Diarrhea, rule out Clostridium difficile colitis.  3. Hypertension, controlled.  Discontinue lisinopril.   4. Coagulopathy with subcutaneous hemorrhage.  CT scan of the abdomen pending.    PLAN:  Continue with supportive care.  No acute indication to start dialysis.  Hyperkalemia and metabolic acidosis are being treated medically.  Repeat all the labs later today, then again in the morning tomorrow.  Keep the Paulino catheter in place.  Follow up on the CAT scan of the abdomen.  If kidney function declines or does not improve, may need to be initiated on dialysis.    Thank you very much.          JARETT GONZALEZ MD      D:  03/24/2024 13:15:29     T:  03/24/2024 17:08:15     JOSUE/NOE  Job #:  [id number]     Doc#:  [id number]     
Drowsy/coma  0 [] Death    Level of patient/caregiver understanding able to:       [x] Verbalize Understanding   [] Demonstrate Understanding       [] Teach Back       [] Needs Reinforcement     []  Other:      Teaching Time:  0hours  20 minutes     Plan        Social Service Consult Made:  Yes  Assistance filling out Living Will/HPOA:  No      Discharge Plan:  Education/support to patient  Providing support for coping/adaptation/distress of patient  Clarification of medical condition to patient and family  Code status clarified: Full Code  Palliative care orders introduced    Discharge Environment:  [] Hospice Consult Agency:  [] Inpatient Hospice    [] Home with Hospice Care   [] ECF with Hospice  [] ECF skilled care with Hospice to follow   [] Other:    Discussion: Patient admitted from home with one month of fatigue, diarrhea and orthopnea, increased bilateral leg swelling and dyspnea with exertion. PMH of CHF, HTN, a fib.  I met with patient gives a history of living alone able to care for self, has  but still drives, pays bills etc. He is able to describe course of events and states he is feeling much better.  He would like to return home but is not opposed to brief stay in facility for therapy, \"maybe a couple of weeks.\"  We discussed code status and he wants to remain a full code and will discuss with his family.     I will continue to follow Mr. Awad's care as needed.      Thank you for allowing me to participate in the care of Mr. Awad .     Electronically signed by Destinee Cheung RN, BSN, CHPN on 3/28/2024 at 5:02 PM  Palliative Care Nurse [facility]  Office: [phone]                          
noted in HPI    PHYSICAL EXAM     Vitals:    03/24/24 1951 03/24/24 2351 03/25/24 0413 03/25/24 0604   BP: (!) 143/71 122/65 (!) 123/55    Pulse: 75 83 69    Resp: 15 19 16    Temp: 98 °F (36.7 °C) 99 °F (37.2 °C) 98.8 °F (37.1 °C)    TempSrc: Oral Oral Oral    SpO2: 95% 92% 93%    Weight:    106.7 kg (235 lb 3.7 oz)   Height:           I/O last 3 completed shifts:  In: 600 [P.O.:600]  Out: 850 [Urine:850]      Physical Exam:  General appearance: alert, cooperative, no distress, appears stated age  Eyes: Anicteric  Head: Normocephalic, without obvious abnormality  Lungs: clear to auscultation bilaterally, Normal Effort  Heart: regular rate and rhythm, normal S1 and S2, no murmurs or rubs  Abdomen: soft, non-distended, non-tender. Bowel sounds normal. No masses,  no organomegaly.   Extremities: atraumatic, no cyanosis or edema  Skin: warm and dry, no jaundice  Neuro: Grossly intact, A&OX3      LABS AND IMAGING   Laboratory   Recent Labs     03/24/24  0951 03/25/24  0017 03/25/24  0512   WBC 4.1 5.2 4.6   HGB 8.2* 7.2* 7.1*   HCT 24.6* 21.0* 20.7*   MCV 96.5 94.4 94.1   * 92* 92*     Recent Labs     03/24/24  1555 03/25/24  0017 03/25/24  0512    141  143 142   K 4.9 5.7*  5.8* 5.7*    111*  113* 112*   CO2 19* 20*  21 20*   PHOS  --  4.8 4.9   BUN 82* 83*  87* 88*   CREATININE 7.9* 8.0*  9.1* 8.8*     Recent Labs     03/24/24  0951 03/25/24  0017   AST 10* 15   ALT 10 12   BILITOT 0.4 0.3   ALKPHOS 69 69     No results for input(s): \"LIPASE\", \"AMYLASE\" in the last 72 hours.  Recent Labs     03/24/24  0951 03/25/24  0512   PROTIME 49.3* 52.0*   INR 5.48* 5.86*       Imaging  CT CHEST ABDOMEN PELVIS WO CONTRAST Additional Contrast? None   Final Result   1. No significant soft tissue hematoma.   2. Mild left basilar airspace disease may represent atelectasis or pneumonia.   3. Trace bilateral pleural effusions, greater on the right.   4. Moderate cardiomegaly with small to moderate pericardial 
clots, draining well  RAMESH Not indicated    Labs:  CBC   Lab Results   Component Value Date/Time    WBC 4.3 03/26/2024 05:58 AM    RBC 2.33 03/26/2024 05:58 AM    HGB 7.4 03/26/2024 05:58 AM    HCT 22.0 03/26/2024 05:58 AM    MCV 94.5 03/26/2024 05:58 AM    MCH 31.8 03/26/2024 05:58 AM    MCHC 33.7 03/26/2024 05:58 AM    RDW 14.2 03/26/2024 05:58 AM    PLT 84 03/26/2024 05:58 AM    MPV 8.4 03/26/2024 05:58 AM     BMP   Lab Results   Component Value Date/Time     03/26/2024 12:24 AM    K 5.9 03/26/2024 12:24 AM     03/26/2024 12:24 AM    CO2 19 03/26/2024 12:24 AM    BUN 80 03/26/2024 12:24 AM    CREATININE 8.3 03/26/2024 12:24 AM    GLUCOSE 114 03/26/2024 12:24 AM    CALCIUM 7.9 03/26/2024 12:24 AM       Urinalysis:   Lab Results   Component Value Date/Time    COLORU Yellow 03/24/2024 12:33 PM    GLUCOSEU Negative 03/24/2024 12:33 PM    BLOODU LARGE 03/24/2024 12:33 PM    NITRU Negative 03/24/2024 12:33 PM    LEUKOCYTESUR TRACE 03/24/2024 12:33 PM       Imaging:  Pertinent images and radiologist's report were reviewed independently  CTC 3/24  IMPRESSION:  1. No significant soft tissue hematoma.  2. Mild left basilar airspace disease may represent atelectasis or pneumonia.  3. Trace bilateral pleural effusions, greater on the right.  4. Moderate cardiomegaly with small to moderate pericardial effusion.  5. Moderate centrilobular emphysematous changes.  6. Cholelithiasis with possible gallbladder wall thickening. Consider right  upper quadrant ultrasound for further evaluation if there is concern for  acute cholecystitis.  However, this may be related to anasarca.  7. Edema noted about the urinary bladder. Correlate with urinalysis to  exclude cystitis.  8. Colonic diverticulosis.  There are traces of mesenteric edema,  particularly adjacent to the descending colon.  There is low suspicion for  acute diverticulitis given the lack of bowel wall thickening.  9. Anasarca including mild body wall edema, trace 
Hg.   4. Left atrium: The atrium was dilated.   5. Right ventricle: The cavity size was normal. Wall thickness was normal. Systolic function was normal.   6. Right atrium: The atrium was dilated.   7. Pulmonary arteries: Estimated PA peak pressure is 45mm Hg (S).   8. Pericardium, extracardiac: A trivial pericardial effusion was identified, with no evidence of tamponade.     Telemetry: Personally interpreted.  Sinus rhythm    Assessment and Plan   1) Paroxysmal atrial fibrillation.  Appears to be in sinus rhythm.  Patient on warfarin which is currently held with supratherapeutic INR.  Dofetilide discontinued with ARIANA.  Patient was not on a beta-blocker secondary to bradycardia in the past per outpatient notes.    2) Chronic diastolic heart failure.  EF 55-60%.  No ACE-I/ARB/Aldactone/Arni/SGLT2i with ARIANA.  Not on beta-blocker secondary to prior bradycardia.      3) ARIANA.  Likely multifactorial secondary to volume depletion, medications, and obstructive uropathy.  Nephrology following.      4) C. difficile diarrhea.  Management per primary team.    Overall, the problems requiring hospitalization are high in severity.     Thank you for allowing us to participate in the care of Jovan Awad.    Marvin Hyde MD  [facility]  3/25/2024 2:48 PM    
oriented to name, place and time. Motor skills grossly intact.   SKIN: Normal skin color, texture, turgor and no jaundice. appears intact   EXTREMITIES: no LE edema       DATA:    PT/INR:    Recent Labs     03/25/24  0512 03/25/24  0017 03/24/24  0951 03/15/24  1015 03/12/24  1016 03/08/24  1642   PROT  --  5.6* 6.2*  --   --   --    INR 5.86*  --  5.48* 4.5 8.0 8.1     PTT:  No results for input(s): \"APTT\" in the last 720 hours.    CMP:    Recent Labs     03/25/24  1208 03/25/24  0512 03/25/24  0017      < > 141  143   K 5.3*   < > 5.7*  5.8*      < > 111*  113*   CO2 18*   < > 20*  21   BUN 80*   < > 83*  87*   PROT  --   --  5.6*    < > = values in this interval not displayed.     Mg:    Recent Labs     03/25/24  0017 03/24/24  1320   MG 1.90 1.90       Lab Results   Component Value Date    CALCIUM 8.0 (L) 03/25/2024    PHOS 4.9 03/25/2024       CBC:    Recent Labs     03/25/24  1058 03/25/24  0512 03/25/24  0017 03/24/24  0951   WBC  --  4.6 5.2 4.1   NEUTROABS  --  3.5 4.1 3.1   LYMPHOPCT  --  12.3 10.7 12.9   RBC  --  2.20* 2.22* 2.55*   HGB 7.5* 7.1* 7.2* 8.2*   HCT 22.7* 20.7* 21.0* 24.6*   MCV  --  94.1 94.4 96.5   MCH  --  32.2 32.3 32.4   MCHC  --  34.2 34.3 33.5   RDW  --  14.0 14.0 14.3   PLT  --  92* 92* 114*        LDH:  Recent Labs     03/25/24  0017   *         Radiology Review:  CT CHEST ABDOMEN PELVIS WO CONTRAST Additional Contrast? None  Narrative: EXAMINATION:  CT OF THE CHEST, ABDOMEN, AND PELVIS WITHOUT CONTRAST 3/24/2024 12:29 pm    TECHNIQUE:  CT of the chest, abdomen and pelvis was performed without the administration  of intravenous contrast. Multiplanar reformatted images are provided for  review. Automated exposure control, iterative reconstruction, and/or weight  based adjustment of the mA/kV was utilized to reduce the radiation dose to as  low as reasonably achievable.    COMPARISON:  None    HISTORY:  ORDERING SYSTEM PROVIDED HISTORY: Low hemoglobin,

## 2024-03-29 LAB
ALBUMIN SERPL-MCNC: 2.9 G/DL (ref 3.4–5)
ANION GAP SERPL CALCULATED.3IONS-SCNC: 10 MMOL/L (ref 3–16)
ANION GAP SERPL CALCULATED.3IONS-SCNC: 9 MMOL/L (ref 3–16)
BASOPHILS # BLD: 0 K/UL (ref 0–0.2)
BASOPHILS NFR BLD: 0.3 %
BUN SERPL-MCNC: 43 MG/DL (ref 7–20)
BUN SERPL-MCNC: 43 MG/DL (ref 7–20)
CALCIUM SERPL-MCNC: 8 MG/DL (ref 8.3–10.6)
CALCIUM SERPL-MCNC: 8.1 MG/DL (ref 8.3–10.6)
CHLORIDE SERPL-SCNC: 105 MMOL/L (ref 99–110)
CHLORIDE SERPL-SCNC: 106 MMOL/L (ref 99–110)
CHLORIDE UR-SCNC: 50 MMOL/L
CO2 SERPL-SCNC: 23 MMOL/L (ref 21–32)
CO2 SERPL-SCNC: 23 MMOL/L (ref 21–32)
CREAT SERPL-MCNC: 6.6 MG/DL (ref 0.8–1.3)
CREAT SERPL-MCNC: 6.8 MG/DL (ref 0.8–1.3)
DEPRECATED RDW RBC AUTO: 14.2 % (ref 12.4–15.4)
EOSINOPHIL # BLD: 0.2 K/UL (ref 0–0.6)
EOSINOPHIL NFR BLD: 4 %
GFR SERPLBLD CREATININE-BSD FMLA CKD-EPI: 7 ML/MIN/{1.73_M2}
GFR SERPLBLD CREATININE-BSD FMLA CKD-EPI: 8 ML/MIN/{1.73_M2}
GLUCOSE BLD-MCNC: 104 MG/DL (ref 70–99)
GLUCOSE BLD-MCNC: 154 MG/DL (ref 70–99)
GLUCOSE BLD-MCNC: 191 MG/DL (ref 70–99)
GLUCOSE SERPL-MCNC: 104 MG/DL (ref 70–99)
GLUCOSE SERPL-MCNC: 154 MG/DL (ref 70–99)
HCT VFR BLD AUTO: 23 % (ref 40.5–52.5)
HGB BLD-MCNC: 8 G/DL (ref 13.5–17.5)
INR PPP: 1.49 (ref 0.84–1.16)
LYMPHOCYTES # BLD: 0.6 K/UL (ref 1–5.1)
LYMPHOCYTES NFR BLD: 11.3 %
MCH RBC QN AUTO: 32.5 PG (ref 26–34)
MCHC RBC AUTO-ENTMCNC: 34.5 G/DL (ref 31–36)
MCV RBC AUTO: 94.1 FL (ref 80–100)
MONOCYTES # BLD: 0.7 K/UL (ref 0–1.3)
MONOCYTES NFR BLD: 12.5 %
NEUTROPHILS # BLD: 4.1 K/UL (ref 1.7–7.7)
NEUTROPHILS NFR BLD: 71.9 %
PERFORMED ON: ABNORMAL
PHOSPHATE SERPL-MCNC: 3.4 MG/DL (ref 2.5–4.9)
PLATELET # BLD AUTO: 122 K/UL (ref 135–450)
PMV BLD AUTO: 8.1 FL (ref 5–10.5)
POTASSIUM SERPL-SCNC: 4.1 MMOL/L (ref 3.5–5.1)
POTASSIUM SERPL-SCNC: 4.1 MMOL/L (ref 3.5–5.1)
POTASSIUM SERPL-SCNC: 4.2 MMOL/L (ref 3.5–5.1)
POTASSIUM UR-SCNC: 31.2 MMOL/L
PROTHROMBIN TIME: 18 SEC (ref 11.5–14.8)
RBC # BLD AUTO: 2.45 M/UL (ref 4.2–5.9)
SODIUM SERPL-SCNC: 137 MMOL/L (ref 136–145)
SODIUM SERPL-SCNC: 139 MMOL/L (ref 136–145)
SODIUM UR-SCNC: 66 MMOL/L
WBC # BLD AUTO: 5.6 K/UL (ref 4–11)

## 2024-03-29 PROCEDURE — 85025 COMPLETE CBC W/AUTO DIFF WBC: CPT

## 2024-03-29 PROCEDURE — 6370000000 HC RX 637 (ALT 250 FOR IP): Performed by: FAMILY MEDICINE

## 2024-03-29 PROCEDURE — 36415 COLL VENOUS BLD VENIPUNCTURE: CPT

## 2024-03-29 PROCEDURE — 90935 HEMODIALYSIS ONE EVALUATION: CPT

## 2024-03-29 PROCEDURE — 97530 THERAPEUTIC ACTIVITIES: CPT

## 2024-03-29 PROCEDURE — 51702 INSERT TEMP BLADDER CATH: CPT

## 2024-03-29 PROCEDURE — 94760 N-INVAS EAR/PLS OXIMETRY 1: CPT

## 2024-03-29 PROCEDURE — 6370000000 HC RX 637 (ALT 250 FOR IP): Performed by: PHYSICIAN ASSISTANT

## 2024-03-29 PROCEDURE — 6360000002 HC RX W HCPCS: Performed by: INTERNAL MEDICINE

## 2024-03-29 PROCEDURE — 2060000000 HC ICU INTERMEDIATE R&B

## 2024-03-29 PROCEDURE — 2580000003 HC RX 258: Performed by: FAMILY MEDICINE

## 2024-03-29 PROCEDURE — 80069 RENAL FUNCTION PANEL: CPT

## 2024-03-29 PROCEDURE — 85610 PROTHROMBIN TIME: CPT

## 2024-03-29 RX ADMIN — SODIUM CHLORIDE, PRESERVATIVE FREE 10 ML: 5 INJECTION INTRAVENOUS at 22:08

## 2024-03-29 RX ADMIN — FIDAXOMICIN 200 MG: 200 TABLET, FILM COATED ORAL at 22:08

## 2024-03-29 RX ADMIN — HEPARIN SODIUM 2400 UNITS: 1000 INJECTION INTRAVENOUS; SUBCUTANEOUS at 16:43

## 2024-03-29 RX ADMIN — ATORVASTATIN CALCIUM 20 MG: 20 TABLET, FILM COATED ORAL at 09:28

## 2024-03-29 RX ADMIN — FINASTERIDE 5 MG: 5 TABLET, FILM COATED ORAL at 09:29

## 2024-03-29 RX ADMIN — FIDAXOMICIN 200 MG: 200 TABLET, FILM COATED ORAL at 09:29

## 2024-03-29 RX ADMIN — LEVOTHYROXINE SODIUM 50 MCG: 0.05 TABLET ORAL at 06:31

## 2024-03-29 RX ADMIN — LEVETIRACETAM 500 MG: 500 TABLET, FILM COATED ORAL at 09:29

## 2024-03-29 RX ADMIN — SODIUM CHLORIDE, PRESERVATIVE FREE 10 ML: 5 INJECTION INTRAVENOUS at 09:28

## 2024-03-29 NOTE — CARE COORDINATION
Discharge Planning:     The Plan for Transition of Care is related to the following treatment goals: Discharge to SNF    The Patient was provided with a choice of provider and agrees   with the discharge plan. [x] Yes [] No    Freedom of choice list was provided with basic dialogue that supports the patient's individualized plan of care/goals, treatment preferences and shares the quality data associated with the providers. [x] Yes [] No      Received call from Veronika at [facility] [phone] stating patient reached out to her requesting placement with them at discharge.  Referral sent via EPIC. Still awaiting final recommendations from nephrology on HD at discharge.    Electronically signed by Nena Moseley RN on 3/29/2024 at 2:27 PM

## 2024-03-30 LAB
ALBUMIN SERPL-MCNC: 2.9 G/DL (ref 3.4–5)
ANION GAP SERPL CALCULATED.3IONS-SCNC: 10 MMOL/L (ref 3–16)
ANION GAP SERPL CALCULATED.3IONS-SCNC: 9 MMOL/L (ref 3–16)
BASOPHILS # BLD: 0 K/UL (ref 0–0.2)
BASOPHILS NFR BLD: 0.4 %
BUN SERPL-MCNC: 26 MG/DL (ref 7–20)
BUN SERPL-MCNC: 27 MG/DL (ref 7–20)
CALCIUM SERPL-MCNC: 8.1 MG/DL (ref 8.3–10.6)
CALCIUM SERPL-MCNC: 8.3 MG/DL (ref 8.3–10.6)
CHLORIDE SERPL-SCNC: 103 MMOL/L (ref 99–110)
CHLORIDE SERPL-SCNC: 105 MMOL/L (ref 99–110)
CO2 SERPL-SCNC: 25 MMOL/L (ref 21–32)
CO2 SERPL-SCNC: 25 MMOL/L (ref 21–32)
CREAT SERPL-MCNC: 5.1 MG/DL (ref 0.8–1.3)
CREAT SERPL-MCNC: 5.2 MG/DL (ref 0.8–1.3)
DEPRECATED RDW RBC AUTO: 14 % (ref 12.4–15.4)
EOSINOPHIL # BLD: 0.2 K/UL (ref 0–0.6)
EOSINOPHIL NFR BLD: 3.5 %
GFR SERPLBLD CREATININE-BSD FMLA CKD-EPI: 10 ML/MIN/{1.73_M2}
GFR SERPLBLD CREATININE-BSD FMLA CKD-EPI: 10 ML/MIN/{1.73_M2}
GLUCOSE BLD-MCNC: 114 MG/DL (ref 70–99)
GLUCOSE BLD-MCNC: 118 MG/DL (ref 70–99)
GLUCOSE BLD-MCNC: 122 MG/DL (ref 70–99)
GLUCOSE BLD-MCNC: 138 MG/DL (ref 70–99)
GLUCOSE SERPL-MCNC: 104 MG/DL (ref 70–99)
GLUCOSE SERPL-MCNC: 112 MG/DL (ref 70–99)
HCT VFR BLD AUTO: 23.7 % (ref 40.5–52.5)
HGB BLD-MCNC: 8.2 G/DL (ref 13.5–17.5)
INR PPP: 1.47 (ref 0.84–1.16)
LYMPHOCYTES # BLD: 0.6 K/UL (ref 1–5.1)
LYMPHOCYTES NFR BLD: 9.4 %
MCH RBC QN AUTO: 32.6 PG (ref 26–34)
MCHC RBC AUTO-ENTMCNC: 34.6 G/DL (ref 31–36)
MCV RBC AUTO: 94.3 FL (ref 80–100)
MONOCYTES # BLD: 0.8 K/UL (ref 0–1.3)
MONOCYTES NFR BLD: 12.5 %
NEUTROPHILS # BLD: 4.7 K/UL (ref 1.7–7.7)
NEUTROPHILS NFR BLD: 74.2 %
PERFORMED ON: ABNORMAL
PHOSPHATE SERPL-MCNC: 2.9 MG/DL (ref 2.5–4.9)
PLATELET # BLD AUTO: 140 K/UL (ref 135–450)
PMV BLD AUTO: 7.2 FL (ref 5–10.5)
POTASSIUM SERPL-SCNC: 4.2 MMOL/L (ref 3.5–5.1)
POTASSIUM SERPL-SCNC: 4.2 MMOL/L (ref 3.5–5.1)
POTASSIUM SERPL-SCNC: 4.4 MMOL/L (ref 3.5–5.1)
PROTHROMBIN TIME: 17.8 SEC (ref 11.5–14.8)
RBC # BLD AUTO: 2.51 M/UL (ref 4.2–5.9)
SODIUM SERPL-SCNC: 138 MMOL/L (ref 136–145)
SODIUM SERPL-SCNC: 139 MMOL/L (ref 136–145)
WBC # BLD AUTO: 6.3 K/UL (ref 4–11)

## 2024-03-30 PROCEDURE — 2060000000 HC ICU INTERMEDIATE R&B

## 2024-03-30 PROCEDURE — 85610 PROTHROMBIN TIME: CPT

## 2024-03-30 PROCEDURE — 6370000000 HC RX 637 (ALT 250 FOR IP): Performed by: FAMILY MEDICINE

## 2024-03-30 PROCEDURE — 6370000000 HC RX 637 (ALT 250 FOR IP): Performed by: PHYSICIAN ASSISTANT

## 2024-03-30 PROCEDURE — 80069 RENAL FUNCTION PANEL: CPT

## 2024-03-30 PROCEDURE — 36415 COLL VENOUS BLD VENIPUNCTURE: CPT

## 2024-03-30 PROCEDURE — 85025 COMPLETE CBC W/AUTO DIFF WBC: CPT

## 2024-03-30 PROCEDURE — 2580000003 HC RX 258: Performed by: FAMILY MEDICINE

## 2024-03-30 RX ADMIN — LEVETIRACETAM 500 MG: 500 TABLET, FILM COATED ORAL at 08:28

## 2024-03-30 RX ADMIN — SODIUM CHLORIDE, PRESERVATIVE FREE 10 ML: 5 INJECTION INTRAVENOUS at 20:04

## 2024-03-30 RX ADMIN — ATORVASTATIN CALCIUM 20 MG: 20 TABLET, FILM COATED ORAL at 08:29

## 2024-03-30 RX ADMIN — FIDAXOMICIN 200 MG: 200 TABLET, FILM COATED ORAL at 20:05

## 2024-03-30 RX ADMIN — FINASTERIDE 5 MG: 5 TABLET, FILM COATED ORAL at 08:30

## 2024-03-30 RX ADMIN — SODIUM CHLORIDE, PRESERVATIVE FREE 10 ML: 5 INJECTION INTRAVENOUS at 08:31

## 2024-03-30 RX ADMIN — LEVOTHYROXINE SODIUM 50 MCG: 0.05 TABLET ORAL at 05:51

## 2024-03-30 RX ADMIN — FIDAXOMICIN 200 MG: 200 TABLET, FILM COATED ORAL at 08:29

## 2024-03-31 LAB
ALBUMIN SERPL-MCNC: 2.9 G/DL (ref 3.4–5)
ANION GAP SERPL CALCULATED.3IONS-SCNC: 10 MMOL/L (ref 3–16)
ANION GAP SERPL CALCULATED.3IONS-SCNC: 10 MMOL/L (ref 3–16)
BASOPHILS # BLD: 0 K/UL (ref 0–0.2)
BASOPHILS NFR BLD: 0.3 %
BUN SERPL-MCNC: 35 MG/DL (ref 7–20)
BUN SERPL-MCNC: 39 MG/DL (ref 7–20)
CALCIUM SERPL-MCNC: 8.1 MG/DL (ref 8.3–10.6)
CALCIUM SERPL-MCNC: 8.3 MG/DL (ref 8.3–10.6)
CHLORIDE SERPL-SCNC: 106 MMOL/L (ref 99–110)
CHLORIDE SERPL-SCNC: 107 MMOL/L (ref 99–110)
CO2 SERPL-SCNC: 23 MMOL/L (ref 21–32)
CO2 SERPL-SCNC: 23 MMOL/L (ref 21–32)
CREAT SERPL-MCNC: 6 MG/DL (ref 0.8–1.3)
CREAT SERPL-MCNC: 6.3 MG/DL (ref 0.8–1.3)
DEPRECATED RDW RBC AUTO: 13.9 % (ref 12.4–15.4)
EOSINOPHIL # BLD: 0.2 K/UL (ref 0–0.6)
EOSINOPHIL NFR BLD: 3.4 %
GFR SERPLBLD CREATININE-BSD FMLA CKD-EPI: 8 ML/MIN/{1.73_M2}
GFR SERPLBLD CREATININE-BSD FMLA CKD-EPI: 9 ML/MIN/{1.73_M2}
GLUCOSE BLD-MCNC: 107 MG/DL (ref 70–99)
GLUCOSE BLD-MCNC: 110 MG/DL (ref 70–99)
GLUCOSE BLD-MCNC: 143 MG/DL (ref 70–99)
GLUCOSE BLD-MCNC: 181 MG/DL (ref 70–99)
GLUCOSE SERPL-MCNC: 104 MG/DL (ref 70–99)
GLUCOSE SERPL-MCNC: 138 MG/DL (ref 70–99)
HCT VFR BLD AUTO: 22.1 % (ref 40.5–52.5)
HGB BLD-MCNC: 7.7 G/DL (ref 13.5–17.5)
INR PPP: 1.52 (ref 0.84–1.16)
LYMPHOCYTES # BLD: 0.7 K/UL (ref 1–5.1)
LYMPHOCYTES NFR BLD: 11.1 %
MCH RBC QN AUTO: 32.8 PG (ref 26–34)
MCHC RBC AUTO-ENTMCNC: 34.9 G/DL (ref 31–36)
MCV RBC AUTO: 93.9 FL (ref 80–100)
MONOCYTES # BLD: 0.7 K/UL (ref 0–1.3)
MONOCYTES NFR BLD: 11.2 %
NEUTROPHILS # BLD: 4.5 K/UL (ref 1.7–7.7)
NEUTROPHILS NFR BLD: 74 %
PERFORMED ON: ABNORMAL
PHOSPHATE SERPL-MCNC: 4.1 MG/DL (ref 2.5–4.9)
PLATELET # BLD AUTO: 136 K/UL (ref 135–450)
PMV BLD AUTO: 7.2 FL (ref 5–10.5)
POTASSIUM SERPL-SCNC: 4.2 MMOL/L (ref 3.5–5.1)
PROTHROMBIN TIME: 18.3 SEC (ref 11.5–14.8)
RBC # BLD AUTO: 2.36 M/UL (ref 4.2–5.9)
SODIUM SERPL-SCNC: 139 MMOL/L (ref 136–145)
SODIUM SERPL-SCNC: 140 MMOL/L (ref 136–145)
WBC # BLD AUTO: 6.1 K/UL (ref 4–11)

## 2024-03-31 PROCEDURE — 0JH63XZ INSERTION OF TUNNELED VASCULAR ACCESS DEVICE INTO CHEST SUBCUTANEOUS TISSUE AND FASCIA, PERCUTANEOUS APPROACH: ICD-10-PCS | Performed by: STUDENT IN AN ORGANIZED HEALTH CARE EDUCATION/TRAINING PROGRAM

## 2024-03-31 PROCEDURE — 05HM33Z INSERTION OF INFUSION DEVICE INTO RIGHT INTERNAL JUGULAR VEIN, PERCUTANEOUS APPROACH: ICD-10-PCS | Performed by: STUDENT IN AN ORGANIZED HEALTH CARE EDUCATION/TRAINING PROGRAM

## 2024-03-31 PROCEDURE — 6370000000 HC RX 637 (ALT 250 FOR IP): Performed by: PHYSICIAN ASSISTANT

## 2024-03-31 PROCEDURE — 94760 N-INVAS EAR/PLS OXIMETRY 1: CPT

## 2024-03-31 PROCEDURE — 85025 COMPLETE CBC W/AUTO DIFF WBC: CPT

## 2024-03-31 PROCEDURE — 2580000003 HC RX 258: Performed by: FAMILY MEDICINE

## 2024-03-31 PROCEDURE — 2060000000 HC ICU INTERMEDIATE R&B

## 2024-03-31 PROCEDURE — 36415 COLL VENOUS BLD VENIPUNCTURE: CPT

## 2024-03-31 PROCEDURE — 02PYX3Z REMOVAL OF INFUSION DEVICE FROM GREAT VESSEL, EXTERNAL APPROACH: ICD-10-PCS | Performed by: STUDENT IN AN ORGANIZED HEALTH CARE EDUCATION/TRAINING PROGRAM

## 2024-03-31 PROCEDURE — 85610 PROTHROMBIN TIME: CPT

## 2024-03-31 PROCEDURE — 6370000000 HC RX 637 (ALT 250 FOR IP): Performed by: FAMILY MEDICINE

## 2024-03-31 PROCEDURE — 6370000000 HC RX 637 (ALT 250 FOR IP): Performed by: INTERNAL MEDICINE

## 2024-03-31 PROCEDURE — 80069 RENAL FUNCTION PANEL: CPT

## 2024-03-31 RX ORDER — NEPHROCAP 1 MG
1 CAP ORAL DAILY
Status: DISCONTINUED | OUTPATIENT
Start: 2024-03-31 | End: 2024-04-03 | Stop reason: HOSPADM

## 2024-03-31 RX ADMIN — SODIUM CHLORIDE, PRESERVATIVE FREE 10 ML: 5 INJECTION INTRAVENOUS at 08:42

## 2024-03-31 RX ADMIN — LEVETIRACETAM 500 MG: 500 TABLET, FILM COATED ORAL at 08:42

## 2024-03-31 RX ADMIN — ATORVASTATIN CALCIUM 20 MG: 20 TABLET, FILM COATED ORAL at 08:42

## 2024-03-31 RX ADMIN — FIDAXOMICIN 200 MG: 200 TABLET, FILM COATED ORAL at 20:39

## 2024-03-31 RX ADMIN — SODIUM CHLORIDE, PRESERVATIVE FREE 10 ML: 5 INJECTION INTRAVENOUS at 20:40

## 2024-03-31 RX ADMIN — FIDAXOMICIN 200 MG: 200 TABLET, FILM COATED ORAL at 08:45

## 2024-03-31 RX ADMIN — LEVOTHYROXINE SODIUM 50 MCG: 0.05 TABLET ORAL at 06:03

## 2024-03-31 RX ADMIN — Medication 1 MG: at 18:40

## 2024-03-31 RX ADMIN — FINASTERIDE 5 MG: 5 TABLET, FILM COATED ORAL at 08:43

## 2024-03-31 ASSESSMENT — PAIN SCALES - GENERAL
PAINLEVEL_OUTOF10: 0

## 2024-04-01 LAB
ALBUMIN SERPL-MCNC: 3 G/DL (ref 3.4–5)
ANION GAP SERPL CALCULATED.3IONS-SCNC: 11 MMOL/L (ref 3–16)
ANION GAP SERPL CALCULATED.3IONS-SCNC: 13 MMOL/L (ref 3–16)
BASOPHILS # BLD: 0 K/UL (ref 0–0.2)
BASOPHILS NFR BLD: 0.5 %
BUN SERPL-MCNC: 21 MG/DL (ref 7–20)
BUN SERPL-MCNC: 45 MG/DL (ref 7–20)
CALCIUM SERPL-MCNC: 8.2 MG/DL (ref 8.3–10.6)
CALCIUM SERPL-MCNC: 8.2 MG/DL (ref 8.3–10.6)
CHLORIDE SERPL-SCNC: 106 MMOL/L (ref 99–110)
CHLORIDE SERPL-SCNC: 107 MMOL/L (ref 99–110)
CO2 SERPL-SCNC: 20 MMOL/L (ref 21–32)
CO2 SERPL-SCNC: 23 MMOL/L (ref 21–32)
CREAT SERPL-MCNC: 3.9 MG/DL (ref 0.8–1.3)
CREAT SERPL-MCNC: 7.1 MG/DL (ref 0.8–1.3)
DEPRECATED RDW RBC AUTO: 14.5 % (ref 12.4–15.4)
EOSINOPHIL # BLD: 0.2 K/UL (ref 0–0.6)
EOSINOPHIL NFR BLD: 2.7 %
GFR SERPLBLD CREATININE-BSD FMLA CKD-EPI: 14 ML/MIN/{1.73_M2}
GFR SERPLBLD CREATININE-BSD FMLA CKD-EPI: 7 ML/MIN/{1.73_M2}
GLUCOSE BLD-MCNC: 126 MG/DL (ref 70–99)
GLUCOSE BLD-MCNC: 135 MG/DL (ref 70–99)
GLUCOSE BLD-MCNC: 152 MG/DL (ref 70–99)
GLUCOSE SERPL-MCNC: 116 MG/DL (ref 70–99)
GLUCOSE SERPL-MCNC: 95 MG/DL (ref 70–99)
HCT VFR BLD AUTO: 22.1 % (ref 40.5–52.5)
HGB BLD-MCNC: 7.8 G/DL (ref 13.5–17.5)
INR PPP: 1.49 (ref 0.84–1.16)
LYMPHOCYTES # BLD: 0.6 K/UL (ref 1–5.1)
LYMPHOCYTES NFR BLD: 9.2 %
MCH RBC QN AUTO: 32.9 PG (ref 26–34)
MCHC RBC AUTO-ENTMCNC: 35.3 G/DL (ref 31–36)
MCV RBC AUTO: 93.2 FL (ref 80–100)
MONOCYTES # BLD: 0.9 K/UL (ref 0–1.3)
MONOCYTES NFR BLD: 12.9 %
NEUTROPHILS # BLD: 4.9 K/UL (ref 1.7–7.7)
NEUTROPHILS NFR BLD: 74.7 %
PERFORMED ON: ABNORMAL
PHOSPHATE SERPL-MCNC: 4.3 MG/DL (ref 2.5–4.9)
PLATELET # BLD AUTO: 139 K/UL (ref 135–450)
PMV BLD AUTO: 6.9 FL (ref 5–10.5)
POTASSIUM SERPL-SCNC: 4 MMOL/L (ref 3.5–5.1)
POTASSIUM SERPL-SCNC: 4.3 MMOL/L (ref 3.5–5.1)
POTASSIUM SERPL-SCNC: 4.3 MMOL/L (ref 3.5–5.1)
PROTHROMBIN TIME: 17.9 SEC (ref 11.5–14.8)
RBC # BLD AUTO: 2.37 M/UL (ref 4.2–5.9)
SODIUM SERPL-SCNC: 140 MMOL/L (ref 136–145)
SODIUM SERPL-SCNC: 140 MMOL/L (ref 136–145)
WBC # BLD AUTO: 6.6 K/UL (ref 4–11)

## 2024-04-01 PROCEDURE — 85610 PROTHROMBIN TIME: CPT

## 2024-04-01 PROCEDURE — 36556 INSERT NON-TUNNEL CV CATH: CPT

## 2024-04-01 PROCEDURE — 6370000000 HC RX 637 (ALT 250 FOR IP): Performed by: INTERNAL MEDICINE

## 2024-04-01 PROCEDURE — 85025 COMPLETE CBC W/AUTO DIFF WBC: CPT

## 2024-04-01 PROCEDURE — 80069 RENAL FUNCTION PANEL: CPT

## 2024-04-01 PROCEDURE — 36415 COLL VENOUS BLD VENIPUNCTURE: CPT

## 2024-04-01 PROCEDURE — 6370000000 HC RX 637 (ALT 250 FOR IP): Performed by: PHYSICIAN ASSISTANT

## 2024-04-01 PROCEDURE — 6360000002 HC RX W HCPCS: Performed by: INTERNAL MEDICINE

## 2024-04-01 PROCEDURE — 90935 HEMODIALYSIS ONE EVALUATION: CPT

## 2024-04-01 PROCEDURE — 2060000000 HC ICU INTERMEDIATE R&B

## 2024-04-01 PROCEDURE — 6370000000 HC RX 637 (ALT 250 FOR IP): Performed by: FAMILY MEDICINE

## 2024-04-01 PROCEDURE — 6360000002 HC RX W HCPCS

## 2024-04-01 PROCEDURE — 94760 N-INVAS EAR/PLS OXIMETRY 1: CPT

## 2024-04-01 PROCEDURE — 2580000003 HC RX 258: Performed by: FAMILY MEDICINE

## 2024-04-01 RX ADMIN — HEPARIN SODIUM 2400 UNITS: 1000 INJECTION INTRAVENOUS; SUBCUTANEOUS at 13:46

## 2024-04-01 RX ADMIN — LEVOTHYROXINE SODIUM 50 MCG: 0.05 TABLET ORAL at 05:45

## 2024-04-01 RX ADMIN — IRON SUCROSE 200 MG: 20 INJECTION, SOLUTION INTRAVENOUS at 16:40

## 2024-04-01 RX ADMIN — ATORVASTATIN CALCIUM 20 MG: 20 TABLET, FILM COATED ORAL at 15:03

## 2024-04-01 RX ADMIN — Medication 1 MG: at 15:03

## 2024-04-01 RX ADMIN — SODIUM CHLORIDE, PRESERVATIVE FREE 10 ML: 5 INJECTION INTRAVENOUS at 21:33

## 2024-04-01 RX ADMIN — FIDAXOMICIN 200 MG: 200 TABLET, FILM COATED ORAL at 21:32

## 2024-04-01 RX ADMIN — EPOETIN ALFA-EPBX 10000 UNITS: 10000 INJECTION, SOLUTION INTRAVENOUS; SUBCUTANEOUS at 13:20

## 2024-04-01 RX ADMIN — LEVETIRACETAM 500 MG: 500 TABLET, FILM COATED ORAL at 15:02

## 2024-04-01 RX ADMIN — FIDAXOMICIN 200 MG: 200 TABLET, FILM COATED ORAL at 15:03

## 2024-04-01 RX ADMIN — ACETAMINOPHEN 325MG 650 MG: 325 TABLET ORAL at 15:10

## 2024-04-01 RX ADMIN — FINASTERIDE 5 MG: 5 TABLET, FILM COATED ORAL at 15:04

## 2024-04-01 RX ADMIN — SODIUM CHLORIDE, PRESERVATIVE FREE 10 ML: 5 INJECTION INTRAVENOUS at 15:04

## 2024-04-01 ASSESSMENT — PAIN DESCRIPTION - DESCRIPTORS: DESCRIPTORS: ACHING

## 2024-04-01 ASSESSMENT — PAIN SCALES - GENERAL
PAINLEVEL_OUTOF10: 0
PAINLEVEL_OUTOF10: 3

## 2024-04-01 ASSESSMENT — PAIN DESCRIPTION - ONSET: ONSET: GRADUAL

## 2024-04-01 ASSESSMENT — PAIN DESCRIPTION - LOCATION: LOCATION: LEG

## 2024-04-01 ASSESSMENT — PAIN DESCRIPTION - FREQUENCY: FREQUENCY: INTERMITTENT

## 2024-04-01 ASSESSMENT — PAIN DESCRIPTION - PAIN TYPE: TYPE: ACUTE PAIN

## 2024-04-01 ASSESSMENT — PAIN - FUNCTIONAL ASSESSMENT: PAIN_FUNCTIONAL_ASSESSMENT: ACTIVITIES ARE NOT PREVENTED

## 2024-04-01 ASSESSMENT — PAIN DESCRIPTION - ORIENTATION: ORIENTATION: RIGHT;LEFT

## 2024-04-01 ASSESSMENT — PAIN SCALES - WONG BAKER: WONGBAKER_NUMERICALRESPONSE: NO HURT

## 2024-04-01 NOTE — FLOWSHEET NOTE
03/29/24 1905   Vital Signs   Temp 98.5 °F (36.9 °C)   Temp Source Oral   Pulse 87   Heart Rate Source Monitor   Respirations 16   BP (!) 146/68   MAP (Calculated) 94   MAP (mmHg) 94   BP Location Right Arm   Oxygen Therapy   SpO2 94 %   O2 Device None (Room air)     Pt resting comfortably in bed. Call light within reach. No needs expressed at this time. Will continue to monitor.    
   03/29/24 2349   Vital Signs   Temp 98.6 °F (37 °C)   Temp Source Oral   Pulse 70   Heart Rate Source Monitor   Respirations 18   BP (!) 152/76   MAP (Calculated) 101   MAP (mmHg) 102   BP Location Right Arm   Oxygen Therapy   SpO2 92 %   O2 Device None (Room air)       
   03/30/24 0454   Vital Signs   Temp 98.2 °F (36.8 °C)   Temp Source Oral   Pulse 81   Heart Rate Source Monitor   Respirations 18   BP (!) 157/75   MAP (Calculated) 102   BP Location Right upper arm   BP Method Automatic   Patient Position Semi fowlers   Pain Assessment   Pain Assessment None - Denies Pain   Oxygen Therapy   SpO2 90 %   Pulse Oximetry Type Intermittent   Pulse Oximeter Device Mode Intermittent   Pulse Oximeter Device Location Left;Finger   O2 Device None (Room air)   O2 Flow Rate (L/min) 0 L/min   Oxygen Therapy None (Room air)   Height and Weight   Weight - Scale   (Pt refused to stand for weight-bed scale not working. Pt statd he would stand for weight later today.)       
   03/30/24 1921   Vital Signs   Temp 98.2 °F (36.8 °C)   Temp Source Oral   Pulse 74   Heart Rate Source Monitor   Respirations 18   BP (!) 162/89   MAP (Calculated) 113   MAP (mmHg) 113   BP Location Right Arm   Oxygen Therapy   SpO2 93 %   O2 Device None (Room air)     Pt resting comfortably in bed. Call light within reach. No needs expressed at this time. Will continue to monitor.    
   03/30/24 2330   Vital Signs   Temp 97.9 °F (36.6 °C)   Temp Source Oral   Pulse 79   Heart Rate Source Monitor   Respirations 18   BP (!) 141/65   MAP (Calculated) 90   BP Location Right upper arm   BP Method Automatic   Patient Position Semi fowlers   Pain Assessment   Pain Assessment None - Denies Pain   Oxygen Therapy   SpO2 91 %   Pulse Oximetry Type Intermittent   Pulse Oximeter Device Mode Intermittent   Pulse Oximeter Device Location Left;Finger   O2 Device None (Room air)   Oxygen Therapy None (Room air)       
   03/31/24 0635   Vital Signs   Temp 98.4 °F (36.9 °C)   Temp Source Oral   Pulse 72   Heart Rate Source Monitor   Respirations 18   BP (!) 154/80   MAP (Calculated) 105   MAP (mmHg) 104   BP Location Right Arm   Oxygen Therapy   SpO2 91 %   O2 Device None (Room air)       
  Dialyzer Clearance  --  Heavily streaked   Duration of Treatment (minutes)  --  200 minutes   Hemodialysis Intake (ml)  --  500 ml   Hemodialysis Output (ml)  --  2237 ml   NET Removed (ml)  --  1737   Tolerated Treatment  --  Good   Dialysis Bath   K+ (Potassium) 3  --    Ca+ (Calcium) 2.5  --    Na+ (Sodium) 138  --    HCO3 (Bicarb) 30  --

## 2024-04-01 NOTE — CARE COORDINATION
DISCHARGE PLANNING:    Spoke with admissions at [facility], they have a bed available for the patient at discharge. Patient cannot do in-house dialysis at [facility] d/t ARIANA diagnosis.  [facility] portal to be initiated, patient will need outpatient chair time.  Patient will likely get a tunneled line placed tomorrow.  Patient will need HENS and transport.    #[phone]  Electronically signed by Azalea Corona RN on 4/1/2024 at 2:25 PM    [facility] portal initiated for outpatient HD chair.    #[phone]  Electronically signed by Azalea Corona RN on 4/1/2024 at 3:49 PM

## 2024-04-02 ENCOUNTER — APPOINTMENT (OUTPATIENT)
Dept: INTERVENTIONAL RADIOLOGY/VASCULAR | Age: 84
DRG: 371 | End: 2024-04-02
Attending: INTERNAL MEDICINE
Payer: MEDICARE

## 2024-04-02 ENCOUNTER — APPOINTMENT (OUTPATIENT)
Dept: GENERAL RADIOLOGY | Age: 84
DRG: 371 | End: 2024-04-02
Payer: MEDICARE

## 2024-04-02 LAB
ALBUMIN SERPL-MCNC: 2.9 G/DL (ref 3.4–5)
ANION GAP SERPL CALCULATED.3IONS-SCNC: 11 MMOL/L (ref 3–16)
ANION GAP SERPL CALCULATED.3IONS-SCNC: 9 MMOL/L (ref 3–16)
BASOPHILS # BLD: 0 K/UL (ref 0–0.2)
BASOPHILS NFR BLD: 0.4 %
BUN SERPL-MCNC: 30 MG/DL (ref 7–20)
BUN SERPL-MCNC: 36 MG/DL (ref 7–20)
CALCIUM SERPL-MCNC: 8.3 MG/DL (ref 8.3–10.6)
CALCIUM SERPL-MCNC: 8.3 MG/DL (ref 8.3–10.6)
CHLORIDE SERPL-SCNC: 105 MMOL/L (ref 99–110)
CHLORIDE SERPL-SCNC: 105 MMOL/L (ref 99–110)
CO2 SERPL-SCNC: 24 MMOL/L (ref 21–32)
CO2 SERPL-SCNC: 25 MMOL/L (ref 21–32)
CREAT SERPL-MCNC: 5 MG/DL (ref 0.8–1.3)
CREAT SERPL-MCNC: 6 MG/DL (ref 0.8–1.3)
DEPRECATED RDW RBC AUTO: 14.2 % (ref 12.4–15.4)
EOSINOPHIL # BLD: 0.3 K/UL (ref 0–0.6)
EOSINOPHIL NFR BLD: 4.4 %
GFR SERPLBLD CREATININE-BSD FMLA CKD-EPI: 11 ML/MIN/{1.73_M2}
GFR SERPLBLD CREATININE-BSD FMLA CKD-EPI: 9 ML/MIN/{1.73_M2}
GLUCOSE BLD-MCNC: 104 MG/DL (ref 70–99)
GLUCOSE BLD-MCNC: 110 MG/DL (ref 70–99)
GLUCOSE BLD-MCNC: 114 MG/DL (ref 70–99)
GLUCOSE BLD-MCNC: 157 MG/DL (ref 70–99)
GLUCOSE SERPL-MCNC: 107 MG/DL (ref 70–99)
GLUCOSE SERPL-MCNC: 109 MG/DL (ref 70–99)
HCT VFR BLD AUTO: 23 % (ref 40.5–52.5)
HGB BLD-MCNC: 8.1 G/DL (ref 13.5–17.5)
INR PPP: 1.54 (ref 0.84–1.16)
LEVETIRACETAM SERPL-MCNC: 14.6 UG/ML (ref 6–46)
LYMPHOCYTES # BLD: 0.8 K/UL (ref 1–5.1)
LYMPHOCYTES NFR BLD: 13.4 %
MAGNESIUM SERPL-MCNC: 1.7 MG/DL (ref 1.8–2.4)
MCH RBC QN AUTO: 33 PG (ref 26–34)
MCHC RBC AUTO-ENTMCNC: 35.4 G/DL (ref 31–36)
MCV RBC AUTO: 93.2 FL (ref 80–100)
MEDICATION DOSE-MCNC: NORMAL
MONOCYTES # BLD: 0.7 K/UL (ref 0–1.3)
MONOCYTES NFR BLD: 12.3 %
NEUTROPHILS # BLD: 4.1 K/UL (ref 1.7–7.7)
NEUTROPHILS NFR BLD: 69.5 %
PERFORMED ON: ABNORMAL
PHOSPHATE SERPL-MCNC: 4.3 MG/DL (ref 2.5–4.9)
PLATELET # BLD AUTO: 124 K/UL (ref 135–450)
PMV BLD AUTO: 7.2 FL (ref 5–10.5)
POTASSIUM SERPL-SCNC: 4.4 MMOL/L (ref 3.5–5.1)
POTASSIUM SERPL-SCNC: 4.6 MMOL/L (ref 3.5–5.1)
PROTHROMBIN TIME: 18.4 SEC (ref 11.5–14.8)
RBC # BLD AUTO: 2.46 M/UL (ref 4.2–5.9)
SODIUM SERPL-SCNC: 139 MMOL/L (ref 136–145)
SODIUM SERPL-SCNC: 140 MMOL/L (ref 136–145)
WBC # BLD AUTO: 5.9 K/UL (ref 4–11)

## 2024-04-02 PROCEDURE — 76937 US GUIDE VASCULAR ACCESS: CPT

## 2024-04-02 PROCEDURE — 2060000000 HC ICU INTERMEDIATE R&B

## 2024-04-02 PROCEDURE — 80069 RENAL FUNCTION PANEL: CPT

## 2024-04-02 PROCEDURE — 85025 COMPLETE CBC W/AUTO DIFF WBC: CPT

## 2024-04-02 PROCEDURE — 36415 COLL VENOUS BLD VENIPUNCTURE: CPT

## 2024-04-02 PROCEDURE — 83735 ASSAY OF MAGNESIUM: CPT

## 2024-04-02 PROCEDURE — 80177 DRUG SCRN QUAN LEVETIRACETAM: CPT

## 2024-04-02 PROCEDURE — 6370000000 HC RX 637 (ALT 250 FOR IP): Performed by: PHYSICIAN ASSISTANT

## 2024-04-02 PROCEDURE — 85610 PROTHROMBIN TIME: CPT

## 2024-04-02 PROCEDURE — 2580000003 HC RX 258: Performed by: FAMILY MEDICINE

## 2024-04-02 PROCEDURE — 73030 X-RAY EXAM OF SHOULDER: CPT

## 2024-04-02 PROCEDURE — 97530 THERAPEUTIC ACTIVITIES: CPT

## 2024-04-02 PROCEDURE — 94760 N-INVAS EAR/PLS OXIMETRY 1: CPT

## 2024-04-02 PROCEDURE — 6370000000 HC RX 637 (ALT 250 FOR IP)

## 2024-04-02 PROCEDURE — 97116 GAIT TRAINING THERAPY: CPT

## 2024-04-02 PROCEDURE — 6370000000 HC RX 637 (ALT 250 FOR IP): Performed by: INTERNAL MEDICINE

## 2024-04-02 PROCEDURE — 97110 THERAPEUTIC EXERCISES: CPT

## 2024-04-02 PROCEDURE — 73080 X-RAY EXAM OF ELBOW: CPT

## 2024-04-02 PROCEDURE — 6370000000 HC RX 637 (ALT 250 FOR IP): Performed by: FAMILY MEDICINE

## 2024-04-02 PROCEDURE — 36558 INSERT TUNNELED CV CATH: CPT

## 2024-04-02 PROCEDURE — 2709999900 IR TUNNELED CVC PLACE WO SQ PORT/PUMP > 5 YEARS

## 2024-04-02 PROCEDURE — 77001 FLUOROGUIDE FOR VEIN DEVICE: CPT

## 2024-04-02 PROCEDURE — 6360000002 HC RX W HCPCS

## 2024-04-02 PROCEDURE — 73000 X-RAY EXAM OF COLLAR BONE: CPT

## 2024-04-02 RX ORDER — LANOLIN ALCOHOL/MO/W.PET/CERES
200 CREAM (GRAM) TOPICAL ONCE
Status: COMPLETED | OUTPATIENT
Start: 2024-04-02 | End: 2024-04-02

## 2024-04-02 RX ADMIN — FIDAXOMICIN 200 MG: 200 TABLET, FILM COATED ORAL at 20:59

## 2024-04-02 RX ADMIN — ATORVASTATIN CALCIUM 20 MG: 20 TABLET, FILM COATED ORAL at 11:41

## 2024-04-02 RX ADMIN — FIDAXOMICIN 200 MG: 200 TABLET, FILM COATED ORAL at 11:41

## 2024-04-02 RX ADMIN — Medication 200 MG: at 15:22

## 2024-04-02 RX ADMIN — LEVETIRACETAM 500 MG: 500 TABLET, FILM COATED ORAL at 11:41

## 2024-04-02 RX ADMIN — FINASTERIDE 5 MG: 5 TABLET, FILM COATED ORAL at 11:42

## 2024-04-02 RX ADMIN — LEVOTHYROXINE SODIUM 50 MCG: 0.05 TABLET ORAL at 06:42

## 2024-04-02 RX ADMIN — ACETAMINOPHEN 325MG 650 MG: 325 TABLET ORAL at 11:51

## 2024-04-02 RX ADMIN — Medication 1 MG: at 11:41

## 2024-04-02 RX ADMIN — IRON SUCROSE 200 MG: 20 INJECTION, SOLUTION INTRAVENOUS at 11:42

## 2024-04-02 RX ADMIN — SODIUM CHLORIDE, PRESERVATIVE FREE 10 ML: 5 INJECTION INTRAVENOUS at 20:59

## 2024-04-02 ASSESSMENT — PAIN DESCRIPTION - ORIENTATION: ORIENTATION: LEFT

## 2024-04-02 ASSESSMENT — PAIN DESCRIPTION - DESCRIPTORS: DESCRIPTORS: ACHING

## 2024-04-02 ASSESSMENT — PAIN DESCRIPTION - LOCATION: LOCATION: KNEE

## 2024-04-02 ASSESSMENT — PAIN SCALES - GENERAL: PAINLEVEL_OUTOF10: 5

## 2024-04-02 NOTE — CARE COORDINATION
DISCHARGE PLANNING:    Spoke to patient at bedside discharge, SNF and outpatient HD chair time.  Patient got a tunneled dialysis catheter today.  Awaiting final [facility] chair time.  [facility] has transport van available to transport patient to HD on MWF to a dialysis center close to [facility].  Awaiting final location, tentative chair time at [facility], but the SNF is unable to accommodate transport to [facility].      #[phone]  Electronically signed by Azalea Corona RN on 4/2/2024 at 2:47 PM

## 2024-04-03 VITALS
RESPIRATION RATE: 16 BRPM | DIASTOLIC BLOOD PRESSURE: 73 MMHG | TEMPERATURE: 98.2 F | HEART RATE: 76 BPM | HEIGHT: 74 IN | BODY MASS INDEX: 25.92 KG/M2 | OXYGEN SATURATION: 93 % | SYSTOLIC BLOOD PRESSURE: 166 MMHG | WEIGHT: 201.94 LBS

## 2024-04-03 LAB
ALBUMIN SERPL-MCNC: 2.9 G/DL (ref 3.4–5)
ANION GAP SERPL CALCULATED.3IONS-SCNC: 11 MMOL/L (ref 3–16)
ANION GAP SERPL CALCULATED.3IONS-SCNC: 13 MMOL/L (ref 3–16)
BASOPHILS # BLD: 0 K/UL (ref 0–0.2)
BASOPHILS NFR BLD: 0.3 %
BUN SERPL-MCNC: 39 MG/DL (ref 7–20)
BUN SERPL-MCNC: 41 MG/DL (ref 7–20)
CALCIUM SERPL-MCNC: 8.2 MG/DL (ref 8.3–10.6)
CALCIUM SERPL-MCNC: 8.4 MG/DL (ref 8.3–10.6)
CHLORIDE SERPL-SCNC: 105 MMOL/L (ref 99–110)
CHLORIDE SERPL-SCNC: 106 MMOL/L (ref 99–110)
CO2 SERPL-SCNC: 22 MMOL/L (ref 21–32)
CO2 SERPL-SCNC: 23 MMOL/L (ref 21–32)
CREAT SERPL-MCNC: 6.5 MG/DL (ref 0.8–1.3)
CREAT SERPL-MCNC: 7 MG/DL (ref 0.8–1.3)
DEPRECATED RDW RBC AUTO: 14.5 % (ref 12.4–15.4)
EOSINOPHIL # BLD: 0.4 K/UL (ref 0–0.6)
EOSINOPHIL NFR BLD: 6.4 %
GFR SERPLBLD CREATININE-BSD FMLA CKD-EPI: 7 ML/MIN/{1.73_M2}
GFR SERPLBLD CREATININE-BSD FMLA CKD-EPI: 8 ML/MIN/{1.73_M2}
GLUCOSE BLD-MCNC: 105 MG/DL (ref 70–99)
GLUCOSE BLD-MCNC: 140 MG/DL (ref 70–99)
GLUCOSE SERPL-MCNC: 101 MG/DL (ref 70–99)
GLUCOSE SERPL-MCNC: 140 MG/DL (ref 70–99)
HCT VFR BLD AUTO: 23.6 % (ref 40.5–52.5)
HGB BLD-MCNC: 8 G/DL (ref 13.5–17.5)
INR PPP: 1.57 (ref 0.84–1.16)
LYMPHOCYTES # BLD: 0.7 K/UL (ref 1–5.1)
LYMPHOCYTES NFR BLD: 12.5 %
MCH RBC QN AUTO: 31.9 PG (ref 26–34)
MCHC RBC AUTO-ENTMCNC: 34 G/DL (ref 31–36)
MCV RBC AUTO: 93.7 FL (ref 80–100)
MONOCYTES # BLD: 0.6 K/UL (ref 0–1.3)
MONOCYTES NFR BLD: 11 %
NEUTROPHILS # BLD: 4.1 K/UL (ref 1.7–7.7)
NEUTROPHILS NFR BLD: 69.8 %
PERFORMED ON: ABNORMAL
PERFORMED ON: ABNORMAL
PHOSPHATE SERPL-MCNC: 5.3 MG/DL (ref 2.5–4.9)
PLATELET # BLD AUTO: 123 K/UL (ref 135–450)
PMV BLD AUTO: 7 FL (ref 5–10.5)
POTASSIUM SERPL-SCNC: 4.4 MMOL/L (ref 3.5–5.1)
POTASSIUM SERPL-SCNC: 4.4 MMOL/L (ref 3.5–5.1)
POTASSIUM SERPL-SCNC: 4.6 MMOL/L (ref 3.5–5.1)
PROTHROMBIN TIME: 18.7 SEC (ref 11.5–14.8)
RBC # BLD AUTO: 2.52 M/UL (ref 4.2–5.9)
SODIUM SERPL-SCNC: 140 MMOL/L (ref 136–145)
SODIUM SERPL-SCNC: 140 MMOL/L (ref 136–145)
WBC # BLD AUTO: 5.9 K/UL (ref 4–11)

## 2024-04-03 PROCEDURE — 97530 THERAPEUTIC ACTIVITIES: CPT

## 2024-04-03 PROCEDURE — 85610 PROTHROMBIN TIME: CPT

## 2024-04-03 PROCEDURE — 97535 SELF CARE MNGMENT TRAINING: CPT

## 2024-04-03 PROCEDURE — 90935 HEMODIALYSIS ONE EVALUATION: CPT

## 2024-04-03 PROCEDURE — 6370000000 HC RX 637 (ALT 250 FOR IP): Performed by: STUDENT IN AN ORGANIZED HEALTH CARE EDUCATION/TRAINING PROGRAM

## 2024-04-03 PROCEDURE — 85025 COMPLETE CBC W/AUTO DIFF WBC: CPT

## 2024-04-03 PROCEDURE — 36415 COLL VENOUS BLD VENIPUNCTURE: CPT

## 2024-04-03 PROCEDURE — 6370000000 HC RX 637 (ALT 250 FOR IP): Performed by: FAMILY MEDICINE

## 2024-04-03 PROCEDURE — 80069 RENAL FUNCTION PANEL: CPT

## 2024-04-03 PROCEDURE — 6360000002 HC RX W HCPCS: Performed by: INTERNAL MEDICINE

## 2024-04-03 RX ORDER — WARFARIN SODIUM 2 MG/1
2 TABLET ORAL
Status: DISCONTINUED | OUTPATIENT
Start: 2024-04-03 | End: 2024-04-03

## 2024-04-03 RX ORDER — NEPHROCAP 1 MG
1 CAP ORAL DAILY
Qty: 30 CAPSULE | Refills: 0 | DISCHARGE
Start: 2024-04-03

## 2024-04-03 RX ORDER — WARFARIN SODIUM 2 MG/1
2 TABLET ORAL
Status: COMPLETED | OUTPATIENT
Start: 2024-04-03 | End: 2024-04-03

## 2024-04-03 RX ORDER — VANCOMYCIN HYDROCHLORIDE 125 MG/1
125 CAPSULE ORAL 4 TIMES DAILY
Qty: 40 CAPSULE | Refills: 0 | DISCHARGE
Start: 2024-04-03 | End: 2024-04-13

## 2024-04-03 RX ADMIN — WARFARIN SODIUM 2 MG: 2 TABLET ORAL at 17:57

## 2024-04-03 RX ADMIN — EPOETIN ALFA-EPBX 10000 UNITS: 10000 INJECTION, SOLUTION INTRAVENOUS; SUBCUTANEOUS at 16:33

## 2024-04-03 RX ADMIN — LEVOTHYROXINE SODIUM 50 MCG: 0.05 TABLET ORAL at 06:08

## 2024-04-03 RX ADMIN — ACETAMINOPHEN 325MG 650 MG: 325 TABLET ORAL at 09:20

## 2024-04-03 ASSESSMENT — PAIN SCALES - GENERAL
PAINLEVEL_OUTOF10: 8
PAINLEVEL_OUTOF10: 2

## 2024-04-03 ASSESSMENT — PAIN - FUNCTIONAL ASSESSMENT: PAIN_FUNCTIONAL_ASSESSMENT: ACTIVITIES ARE NOT PREVENTED

## 2024-04-03 ASSESSMENT — PAIN SCALES - WONG BAKER: WONGBAKER_NUMERICALRESPONSE: NO HURT

## 2024-04-03 ASSESSMENT — PAIN DESCRIPTION - DESCRIPTORS: DESCRIPTORS: ACHING;NAGGING

## 2024-04-03 ASSESSMENT — PAIN DESCRIPTION - ORIENTATION: ORIENTATION: RIGHT

## 2024-04-03 ASSESSMENT — PAIN DESCRIPTION - LOCATION: LOCATION: SHOULDER;ELBOW

## 2024-04-03 NOTE — DISCHARGE SUMMARY
V2.0  Discharge Summary    Name:  Jovan Awad /Age/Sex: 1940 (83 y.o. male)   Admit Date: 3/24/2024  Discharge Date: 4/3/24    MRN & CSN:  [id number] & [id number] Encounter Date and Time 4/3/24 5:09 PM EDT    Attending:  Sami Estrada MD Discharging Provider: Sami Estrada MD       Hospital Course:     Brief HPI: Jovan Awad is a 83 y.o. male who presented with fatigue and diarrhea    Brief Problem Based Course:   C. difficile infection: Patient is recently treated with antibiotics for UTI presented with significant diarrhea. Was found to be C. difficile positive.  Was started on fidaxomicin with improvement in symptoms.  Patient will complete his course with oral vancomycin due to cost  Acute renal failure: Patient was found to be in acute renal failure with hyperkalemia and non-anion gap metabolic acidosis due to history of significant diarrhea.  Nephrology was consulted and patient was initially treated with IV fluids.  However renal function failed to make significant recovery.  Patient ultimately had to be started on dialysis.  Patient tolerated dialysis well while admitted.  Tunneled HD catheter was placed and patient will continue on HD as outpatient while monitoring for renal recovery  Hyperkalemia: Secondary to renal failure.  Patient was treated medically, but as underlying cause (renal failure) was not improving, potassium ultimately had to be managed with dialysis  Non-anion gap metabolic acidosis: Secondary to renal failure.  Patient was attempted to be treated medically but eventually require dialysis as the underlying cause was not improved      The patient expressed appropriate understanding of, and agreement with the discharge recommendations, medications, and plan.     Consults this admission:  IP CONSULT TO NEPHROLOGY  IP CONSULT TO HOSPITALIST  IP CONSULT TO PHARMACY  IP CONSULT TO GI  IP CONSULT TO CARDIOLOGY  IP CONSULT TO PALLIATIVE CARE  IP CONSULT TO HEM/ONC  IP CONSULT TO

## 2024-04-03 NOTE — DISCHARGE INSTR - COC
Update Admission H&P: No change in H&P    PHYSICIAN SIGNATURE:  Electronically signed by Sami Estrada MD on 4/3/24 at 12:18 PM EDT

## 2024-04-03 NOTE — PLAN OF CARE
Problem: Discharge Planning  Goal: Discharge to home or other facility with appropriate resources  3/27/2024 0757 by Elida Acosta RN  Outcome: Progressing  3/26/2024 2323 by Alla Morales RN  Outcome: Progressing     Problem: Safety - Adult  Goal: Free from fall injury  3/27/2024 0757 by Elida Acosta RN  Outcome: Progressing  3/26/2024 2323 by Alla Morales RN  Outcome: Progressing     Problem: ABCDS Injury Assessment  Goal: Absence of physical injury  3/27/2024 0757 by Elida Acosta RN  Outcome: Progressing  3/26/2024 2323 by Alla Morales RN  Outcome: Progressing     Problem: Chronic Conditions and Co-morbidities  Goal: Patient's chronic conditions and co-morbidity symptoms are monitored and maintained or improved  3/27/2024 0757 by Elida Acosta RN  Outcome: Progressing  3/26/2024 2323 by Alla Morales RN  Outcome: Progressing     Problem: Respiratory - Adult  Goal: Achieves optimal ventilation and oxygenation  3/27/2024 0757 by Elida Acosta RN  Outcome: Progressing  3/26/2024 2323 by Alla Morales RN  Outcome: Progressing     Problem: Cardiovascular - Adult  Goal: Maintains optimal cardiac output and hemodynamic stability  3/27/2024 0757 by Elida Acosta RN  Outcome: Progressing  3/26/2024 2323 by Alla Morales RN  Outcome: Progressing  Goal: Absence of cardiac dysrhythmias or at baseline  3/27/2024 0757 by Elida Acosta RN  Outcome: Progressing  3/26/2024 2323 by Alla Morales RN  Outcome: Progressing     Problem: Musculoskeletal - Adult  Goal: Return mobility to safest level of function  3/27/2024 0757 by Elida Acosta RN  Outcome: Progressing  3/26/2024 2323 by Alla Morales RN  Outcome: Progressing  Goal: Maintain proper alignment of affected body part  3/27/2024 0757 by Elida Acosta RN  Outcome: Progressing  3/26/2024 2323 by Alla Morales RN  Outcome: Progressing  Goal: Return ADL status to a safe level of 
  Problem: Discharge Planning  Goal: Discharge to home or other facility with appropriate resources  3/28/2024 1110 by Lindy Young RN  Outcome: Progressing  Flowsheets (Taken 3/28/2024 0815)  Discharge to home or other facility with appropriate resources: Identify barriers to discharge with patient and caregiver       Problem: Safety - Adult  Goal: Free from fall injury  3/28/2024 1110 by Lindy Young RN  Outcome: Progressing       Problem: ABCDS Injury Assessment  Goal: Absence of physical injury  3/28/2024 1110 by Lindy Young RN  Outcome: Progressing       Problem: Chronic Conditions and Co-morbidities  Goal: Patient's chronic conditions and co-morbidity symptoms are monitored and maintained or improved  3/28/2024 1110 by Lindy Young RN  Outcome: Progressing  Flowsheets (Taken 3/28/2024 0815)  Care Plan - Patient's Chronic Conditions and Co-Morbidity Symptoms are Monitored and Maintained or Improved: Monitor and assess patient's chronic conditions and comorbid symptoms for stability, deterioration, or improvement       Problem: Respiratory - Adult  Goal: Achieves optimal ventilation and oxygenation  3/28/2024 1110 by Lindy Young RN  Outcome: Progressing       Problem: Cardiovascular - Adult  Goal: Maintains optimal cardiac output and hemodynamic stability  3/28/2024 1110 by Lindy Young RN  Outcome: Progressing    Goal: Absence of cardiac dysrhythmias or at baseline  3/28/2024 1110 by Lindy Young RN  Outcome: Progressing       Problem: Musculoskeletal - Adult  Goal: Return mobility to safest level of function  3/28/2024 1110 by Lindy Young RN  Outcome: Progressing  Flowsheets (Taken 3/28/2024 0815)  Return Mobility to Safest Level of Function: Assess patient stability and activity tolerance for standing, transferring and ambulating with or without assistive devices    Goal: Maintain proper alignment of affected body part  3/28/2024 1110 by Lindy Young RN  Outcome: Progressing    Goal: Return ADL 
  Problem: Discharge Planning  Goal: Discharge to home or other facility with appropriate resources  3/29/2024 1014 by Lindy Young RN  Outcome: Progressing  Flowsheets (Taken 3/29/2024 0823)  Discharge to home or other facility with appropriate resources: Identify barriers to discharge with patient and caregiver       Problem: Safety - Adult  Goal: Free from fall injury  3/29/2024 1014 by Lindy Young RN  Outcome: Progressing     Problem: ABCDS Injury Assessment  Goal: Absence of physical injury  3/29/2024 1014 by Lindy Young RN  Outcome: Progressing     Problem: Chronic Conditions and Co-morbidities  Goal: Patient's chronic conditions and co-morbidity symptoms are monitored and maintained or improved  3/29/2024 1014 by Lindy Young RN  Outcome: Progressing  Flowsheets (Taken 3/29/2024 0823)  Care Plan - Patient's Chronic Conditions and Co-Morbidity Symptoms are Monitored and Maintained or Improved: Monitor and assess patient's chronic conditions and comorbid symptoms for stability, deterioration, or improvement     Problem: Respiratory - Adult  Goal: Achieves optimal ventilation and oxygenation  3/29/2024 1014 by Lindy Young RN  Outcome: Progressing     Problem: Cardiovascular - Adult  Goal: Maintains optimal cardiac output and hemodynamic stability  3/29/2024 1014 by Lindy Young RN  Outcome: Progressing  Flowsheets (Taken 3/29/2024 0823)  Maintains optimal cardiac output and hemodynamic stability: Monitor blood pressure and heart rate    Goal: Absence of cardiac dysrhythmias or at baseline  3/29/2024 1014 by Lindy Young RN  Outcome: Progressing  Flowsheets (Taken 3/29/2024 0823)  Absence of cardiac dysrhythmias or at baseline: Monitor cardiac rate and rhythm       Problem: Musculoskeletal - Adult  Goal: Return mobility to safest level of function  3/29/2024 1014 by Lindy Young RN  Outcome: Progressing    Goal: Maintain proper alignment of affected body part  3/29/2024 1014 by Lindy Young 
  Problem: Discharge Planning  Goal: Discharge to home or other facility with appropriate resources  4/1/2024 0754 by Elida De León RN  Outcome: Progressing  3/31/2024 1949 by Prasanna Laureano RN  Outcome: Progressing     Problem: Safety - Adult  Goal: Free from fall injury  4/1/2024 0754 by Elida De León RN  Outcome: Progressing  3/31/2024 1949 by Prasanna Laureano RN  Outcome: Progressing     Problem: ABCDS Injury Assessment  Goal: Absence of physical injury  4/1/2024 0754 by Elida De León RN  Outcome: Progressing  3/31/2024 1949 by Prasanna Laureano RN  Outcome: Progressing     Problem: Chronic Conditions and Co-morbidities  Goal: Patient's chronic conditions and co-morbidity symptoms are monitored and maintained or improved  4/1/2024 0754 by Elida De León RN  Outcome: Progressing  3/31/2024 1949 by Prasanna Laureano RN  Outcome: Progressing     Problem: Respiratory - Adult  Goal: Achieves optimal ventilation and oxygenation  4/1/2024 0754 by Elida De León RN  Outcome: Progressing  3/31/2024 1949 by Prasanna Laureano RN  Outcome: Progressing     Problem: Cardiovascular - Adult  Goal: Maintains optimal cardiac output and hemodynamic stability  4/1/2024 0754 by Elida De León RN  Outcome: Progressing  3/31/2024 1949 by Prasanna Laureano RN  Outcome: Progressing  Flowsheets (Taken 3/31/2024 0807 by Elida De León RN)  Maintains optimal cardiac output and hemodynamic stability:   Monitor blood pressure and heart rate   Monitor urine output and notify Licensed Independent Practitioner for values outside of normal range   Assess for signs of decreased cardiac output  Goal: Absence of cardiac dysrhythmias or at baseline  4/1/2024 0754 by Elida De León RN  Outcome: Progressing  3/31/2024 1949 by Prasanna Laureano RN  Outcome: Progressing  Flowsheets (Taken 3/31/2024 0807 by Elida De León RN)  Absence of cardiac dysrhythmias or at baseline:   Assess for signs of decreased 
  Problem: Discharge Planning  Goal: Discharge to home or other facility with appropriate resources  Outcome: Progressing     Problem: Safety - Adult  Goal: Free from fall injury  4/3/2024 0021 by Haley Newberry, RN  Outcome: Progressing  4/2/2024 1839 by Melyssa Cheema RN  Outcome: Progressing     Problem: ABCDS Injury Assessment  Goal: Absence of physical injury  Outcome: Progressing     Problem: Chronic Conditions and Co-morbidities  Goal: Patient's chronic conditions and co-morbidity symptoms are monitored and maintained or improved  Outcome: Progressing     Problem: Respiratory - Adult  Goal: Achieves optimal ventilation and oxygenation  Outcome: Progressing     Problem: Cardiovascular - Adult  Goal: Maintains optimal cardiac output and hemodynamic stability  Outcome: Progressing  Goal: Absence of cardiac dysrhythmias or at baseline  Outcome: Progressing     Problem: Musculoskeletal - Adult  Goal: Return mobility to safest level of function  Outcome: Progressing  Goal: Maintain proper alignment of affected body part  Outcome: Progressing  Goal: Return ADL status to a safe level of function  Outcome: Progressing     Problem: Metabolic/Fluid and Electrolytes - Adult  Goal: Electrolytes maintained within normal limits  Outcome: Progressing  Goal: Hemodynamic stability and optimal renal function maintained  Outcome: Progressing  Goal: Glucose maintained within prescribed range  Outcome: Progressing     Problem: Hematologic - Adult  Goal: Maintains hematologic stability  Outcome: Progressing     Problem: Pain  Goal: Verbalizes/displays adequate comfort level or baseline comfort level  Outcome: Progressing     
  Problem: Discharge Planning  Goal: Discharge to home or other facility with appropriate resources  Outcome: Progressing     Problem: Safety - Adult  Goal: Free from fall injury  Outcome: Progressing     Problem: ABCDS Injury Assessment  Goal: Absence of physical injury  Outcome: Progressing     Problem: Chronic Conditions and Co-morbidities  Goal: Patient's chronic conditions and co-morbidity symptoms are monitored and maintained or improved  Outcome: Progressing     Problem: Respiratory - Adult  Goal: Achieves optimal ventilation and oxygenation  Outcome: Progressing     Problem: Cardiovascular - Adult  Goal: Maintains optimal cardiac output and hemodynamic stability  Outcome: Progressing     Problem: Cardiovascular - Adult  Goal: Absence of cardiac dysrhythmias or at baseline  Outcome: Progressing     Problem: Musculoskeletal - Adult  Goal: Return mobility to safest level of function  Outcome: Progressing     Problem: Musculoskeletal - Adult  Goal: Maintain proper alignment of affected body part  Outcome: Progressing     Problem: Musculoskeletal - Adult  Goal: Return ADL status to a safe level of function  Outcome: Progressing     Problem: Metabolic/Fluid and Electrolytes - Adult  Goal: Electrolytes maintained within normal limits  Outcome: Progressing     Problem: Metabolic/Fluid and Electrolytes - Adult  Goal: Hemodynamic stability and optimal renal function maintained  Outcome: Progressing     Problem: Metabolic/Fluid and Electrolytes - Adult  Goal: Glucose maintained within prescribed range  Outcome: Progressing     Problem: Hematologic - Adult  Goal: Maintains hematologic stability  Outcome: Progressing     
  Problem: Discharge Planning  Goal: Discharge to home or other facility with appropriate resources  Outcome: Progressing     Problem: Safety - Adult  Goal: Free from fall injury  Outcome: Progressing     Problem: ABCDS Injury Assessment  Goal: Absence of physical injury  Outcome: Progressing     Problem: Chronic Conditions and Co-morbidities  Goal: Patient's chronic conditions and co-morbidity symptoms are monitored and maintained or improved  Outcome: Progressing     Problem: Respiratory - Adult  Goal: Achieves optimal ventilation and oxygenation  Outcome: Progressing     Problem: Cardiovascular - Adult  Goal: Maintains optimal cardiac output and hemodynamic stability  Outcome: Progressing     Problem: Cardiovascular - Adult  Goal: Absence of cardiac dysrhythmias or at baseline  Outcome: Progressing     Problem: Musculoskeletal - Adult  Goal: Return mobility to safest level of function  Outcome: Progressing     Problem: Musculoskeletal - Adult  Goal: Maintain proper alignment of affected body part  Outcome: Progressing     Problem: Musculoskeletal - Adult  Goal: Return ADL status to a safe level of function  Outcome: Progressing     Problem: Metabolic/Fluid and Electrolytes - Adult  Goal: Electrolytes maintained within normal limits  Outcome: Progressing     Problem: Metabolic/Fluid and Electrolytes - Adult  Goal: Hemodynamic stability and optimal renal function maintained  Outcome: Progressing     Problem: Metabolic/Fluid and Electrolytes - Adult  Goal: Glucose maintained within prescribed range  Outcome: Progressing     Problem: Hematologic - Adult  Goal: Maintains hematologic stability  Outcome: Progressing     
  Problem: Discharge Planning  Goal: Discharge to home or other facility with appropriate resources  Outcome: Progressing     Problem: Safety - Adult  Goal: Free from fall injury  Outcome: Progressing     Problem: ABCDS Injury Assessment  Goal: Absence of physical injury  Outcome: Progressing     Problem: Chronic Conditions and Co-morbidities  Goal: Patient's chronic conditions and co-morbidity symptoms are monitored and maintained or improved  Outcome: Progressing     Problem: Respiratory - Adult  Goal: Achieves optimal ventilation and oxygenation  Outcome: Progressing     Problem: Cardiovascular - Adult  Goal: Maintains optimal cardiac output and hemodynamic stability  Outcome: Progressing     Problem: Musculoskeletal - Adult  Goal: Return mobility to safest level of function  Outcome: Progressing     Problem: Metabolic/Fluid and Electrolytes - Adult  Goal: Electrolytes maintained within normal limits  Outcome: Progressing     
  Problem: Discharge Planning  Goal: Discharge to home or other facility with appropriate resources  Outcome: Progressing  Flowsheets (Taken 3/24/2024 4756)  Discharge to home or other facility with appropriate resources:   Identify barriers to discharge with patient and caregiver   Arrange for needed discharge resources and transportation as appropriate     Problem: Safety - Adult  Goal: Free from fall injury  Outcome: Progressing     Problem: ABCDS Injury Assessment  Goal: Absence of physical injury  Outcome: Progressing     
  Problem: Discharge Planning  Goal: Discharge to home or other facility with appropriate resources  Outcome: Progressing  Flowsheets (Taken 3/30/2024 1951)  Discharge to home or other facility with appropriate resources: Identify barriers to discharge with patient and caregiver     Problem: Safety - Adult  Goal: Free from fall injury  3/30/2024 2111 by Wendi Cristobal, RN  Outcome: Progressing  3/30/2024 0958 by Anya Fernandez RN  Outcome: Progressing     Problem: ABCDS Injury Assessment  Goal: Absence of physical injury  Outcome: Progressing     Problem: Chronic Conditions and Co-morbidities  Goal: Patient's chronic conditions and co-morbidity symptoms are monitored and maintained or improved  Outcome: Progressing  Flowsheets (Taken 3/30/2024 1951)  Care Plan - Patient's Chronic Conditions and Co-Morbidity Symptoms are Monitored and Maintained or Improved: Monitor and assess patient's chronic conditions and comorbid symptoms for stability, deterioration, or improvement     Problem: Respiratory - Adult  Goal: Achieves optimal ventilation and oxygenation  Outcome: Progressing  Flowsheets (Taken 3/30/2024 1951)  Achieves optimal ventilation and oxygenation: Assess for changes in respiratory status     Problem: Cardiovascular - Adult  Goal: Maintains optimal cardiac output and hemodynamic stability  Outcome: Progressing  Flowsheets (Taken 3/30/2024 1951)  Maintains optimal cardiac output and hemodynamic stability: Monitor blood pressure and heart rate  Goal: Absence of cardiac dysrhythmias or at baseline  Outcome: Progressing  Flowsheets (Taken 3/30/2024 1951)  Absence of cardiac dysrhythmias or at baseline: Monitor cardiac rate and rhythm     Problem: Musculoskeletal - Adult  Goal: Return mobility to safest level of function  Outcome: Progressing  Flowsheets (Taken 3/30/2024 1951)  Return Mobility to Safest Level of Function: Assess patient stability and activity tolerance for standing, transferring and 
  Problem: Safety - Adult  Goal: Free from fall injury  3/30/2024 0958 by Anya Fernandez, RN  Outcome: Progressing   Fall risk assessment completed per unit protocol. Patient's bed is in the lowest position, call light is within reach and the patient's room is free of clutter. The patient has been instructed to call for assistance before getting out of bed or the chair.     Problem: Metabolic/Fluid and Electrolytes - Adult  Goal: Electrolytes maintained within normal limits  3/30/2024 0958 by Anya Fernandez, RN  Outcome: Progressing   Patient's electrolytes are being monitored daily with lab work.   
  Problem: Safety - Adult  Goal: Free from fall injury  Outcome: Progressing     
Administer antiarrhythmia medication and electrolyte replacement as ordered  3/31/2024 0806 by Elida De León RN  Outcome: Progressing     Problem: Musculoskeletal - Adult  Goal: Return mobility to safest level of function  3/31/2024 1949 by Prasanna Laureano RN  Outcome: Progressing  3/31/2024 0806 by Elida De León RN  Outcome: Progressing  Goal: Maintain proper alignment of affected body part  3/31/2024 1949 by Prasanna Laureano RN  Outcome: Progressing  3/31/2024 0806 by Elida De León RN  Outcome: Progressing  Goal: Return ADL status to a safe level of function  3/31/2024 1949 by Prasanna Laureano RN  Outcome: Progressing  3/31/2024 0806 by Elida De León RN  Outcome: Progressing     Problem: Metabolic/Fluid and Electrolytes - Adult  Goal: Electrolytes maintained within normal limits  3/31/2024 1949 by Prasanna Laureano RN  Outcome: Progressing  3/31/2024 0806 by Elida De León RN  Outcome: Progressing  Goal: Hemodynamic stability and optimal renal function maintained  3/31/2024 1949 by Prasanna Laureano RN  Outcome: Progressing  3/31/2024 0806 by Elida De León RN  Outcome: Progressing  Goal: Glucose maintained within prescribed range  3/31/2024 1949 by Prasanna Laureano RN  Outcome: Progressing  3/31/2024 0806 by Elida De León RN  Outcome: Progressing     Problem: Hematologic - Adult  Goal: Maintains hematologic stability  3/31/2024 1949 by Prasanna Laureano RN  Outcome: Progressing  3/31/2024 0806 by Elida De León RN  Outcome: Progressing     
Progressing  3/30/2024 2111 by Wendi Cristobal, RN  Outcome: Progressing  Flowsheets (Taken 3/30/2024 1951)  Hemodynamic stability and optimal renal function maintained: Monitor labs and assess for signs and symptoms of volume excess or deficit  Goal: Glucose maintained within prescribed range  3/31/2024 0806 by Elida De León RN  Outcome: Progressing  3/30/2024 2111 by Wendi Cristobal RN  Outcome: Progressing  Flowsheets (Taken 3/30/2024 1951)  Glucose maintained within prescribed range: Monitor blood glucose as ordered     Problem: Hematologic - Adult  Goal: Maintains hematologic stability  3/31/2024 0806 by Elida De León RN  Outcome: Progressing  3/30/2024 2111 by Wendi Cristobal RN  Outcome: Progressing  Flowsheets (Taken 3/30/2024 1951)  Maintains hematologic stability: Assess for signs and symptoms of bleeding or hemorrhage     
a Safe Level of Function: Administer medication as ordered  3/29/2024 1014 by Lindy Young RN  Outcome: Progressing     Problem: Metabolic/Fluid and Electrolytes - Adult  Goal: Electrolytes maintained within normal limits  3/29/2024 2356 by Wendi Cristobal RN  Outcome: Progressing  Flowsheets (Taken 3/29/2024 2039)  Electrolytes maintained within normal limits: Monitor labs and assess patient for signs and symptoms of electrolyte imbalances  3/29/2024 1014 by Lindy Young RN  Outcome: Progressing  Flowsheets (Taken 3/29/2024 0823)  Electrolytes maintained within normal limits: Monitor labs and assess patient for signs and symptoms of electrolyte imbalances  Goal: Hemodynamic stability and optimal renal function maintained  3/29/2024 2356 by Wendi Cristobal RN  Outcome: Progressing  Flowsheets (Taken 3/29/2024 2039)  Hemodynamic stability and optimal renal function maintained: Monitor labs and assess for signs and symptoms of volume excess or deficit  3/29/2024 1014 by Lindy Young RN  Outcome: Progressing  Goal: Glucose maintained within prescribed range  3/29/2024 2356 by Wendi Cristobal RN  Outcome: Progressing  Flowsheets (Taken 3/29/2024 2039)  Glucose maintained within prescribed range: Monitor blood glucose as ordered  3/29/2024 1014 by Lindy Young RN  Outcome: Progressing     Problem: Hematologic - Adult  Goal: Maintains hematologic stability  3/29/2024 2356 by Wendi Cristobal RN  Outcome: Progressing  Flowsheets (Taken 3/29/2024 2039)  Maintains hematologic stability: Assess for signs and symptoms of bleeding or hemorrhage  3/29/2024 1014 by Lindy Young RN  Outcome: Progressing

## 2024-04-03 NOTE — PROGRESS NOTES
ONCOLOGY HEMATOLOGY CARE PROGRESS NOTE      SUBJECTIVE:    Pt has stable hgb today   In the 8 range     ROS:     Constitutional:  No weight loss, No fever, No chills, No night sweats.  Energy level fair  Eyes:  No impairment or change in vision  ENT / Mouth:  No pain, abnormal ulceration, bleeding, nasal drip or change Positive loss of hearing  Cardiovascular:  No chest pain, palpitations, new edema, or calf discomfort  Respiratory:  No pain, hemoptysis, change to breathing  Breast:  No pain, discharge, change in appearance or texture  Gastrointestinal:  No pain, cramping, jaundice, change to eating and bowel habits  Urinary:  No pain, bleeding or change in continence  Genitalia: No pain, bleeding or discharge  Musculoskeletal:  No redness, pain, edema or weakness  Skin:  No pruritus, rash, change to nodules or lesions  Neurologic:  No discomfort, change in mental status, speech, sensory or motor activity  Psychiatric:  No change in concentration or change to affect or mood  Endocrine:  No hot flashes, increased thirst, or change to urine production  Hematologic: No petechiae, ecchymosis or bleeding  Lymphatic:  No lymphadenopathy or lymphedema  Allergy / Immunologic:  No eczema, hives, frequent or recurrent infections    OBJECTIVE        Physical    VITALS:  Patient Vitals for the past 24 hrs:   BP Temp Temp src Pulse Resp SpO2 Weight   04/02/24 0705 (!) 141/66 97.9 °F (36.6 °C) Oral 80 16 90 % --   04/02/24 0531 -- -- -- -- -- -- 101.2 kg (223 lb 1.7 oz)   04/02/24 0431 (!) 148/81 97.6 °F (36.4 °C) Oral 75 20 (!) 89 % --   04/02/24 0045 (!) 149/76 97.2 °F (36.2 °C) Oral 87 18 92 % --   04/01/24 1947 (!) 87/45 98.4 °F (36.9 °C) Oral 73 16 94 % --   04/01/24 1535 138/64 97.7 °F (36.5 °C) Oral 84 20 94 % --   04/01/24 1442 136/66 97.5 °F (36.4 °C) Oral 83 20 94 % --   04/01/24 1414 -- -- -- 93 18 92 % --   04/01/24 1357 139/78 97.5 °F (36.4 °C) -- 73 20 -- 100 kg (220 lb 7.4 oz) 
                                      ONCOLOGY HEMATOLOGY CARE PROGRESS NOTE      SUBJECTIVE:    The patient is very hard of hearing even with his hearing aids.  He states that he feels mildly fatigued.  He is struggling with whether or not they are going to put in some form of dialysis catheter.  He denies any chest pain or shortness of breath.  He is mildly fatigued    ROS:     Constitutional:  No weight loss, No fever, No chills, No night sweats.  Energy level fair  Eyes:  No impairment or change in vision  ENT / Mouth:  No pain, abnormal ulceration, bleeding, nasal drip or change Positive loss of hearing  Cardiovascular:  No chest pain, palpitations, new edema, or calf discomfort  Respiratory:  No pain, hemoptysis, change to breathing  Breast:  No pain, discharge, change in appearance or texture  Gastrointestinal:  No pain, cramping, jaundice, change to eating and bowel habits  Urinary:  No pain, bleeding or change in continence  Genitalia: No pain, bleeding or discharge  Musculoskeletal:  No redness, pain, edema or weakness  Skin:  No pruritus, rash, change to nodules or lesions  Neurologic:  No discomfort, change in mental status, speech, sensory or motor activity  Psychiatric:  No change in concentration or change to affect or mood  Endocrine:  No hot flashes, increased thirst, or change to urine production  Hematologic: No petechiae, ecchymosis or bleeding  Lymphatic:  No lymphadenopathy or lymphedema  Allergy / Immunologic:  No eczema, hives, frequent or recurrent infections    OBJECTIVE        Physical    VITALS:  Patient Vitals for the past 24 hrs:   BP Temp Temp src Pulse Resp SpO2 Weight   04/01/24 0421 -- -- -- -- -- -- 101.5 kg (223 lb 12.3 oz)   04/01/24 0409 (!) 140/69 98.1 °F (36.7 °C) Oral 90 20 91 % --   03/31/24 2358 (!) 148/87 98.3 °F (36.8 °C) Oral 84 16 91 % --   03/31/24 1908 (!) 177/82 99.4 °F (37.4 °C) Oral 76 18 92 % --   03/31/24 1545 (!) 141/69 98.3 °F (36.8 °C) Oral 70 -- 94 % -- 
                                      ONCOLOGY HEMATOLOGY CARE PROGRESS NOTE      SUBJECTIVE:  Patient's diarrhea is improving.  He denies any shortness of breath or chest pain currently.    ROS:     Constitutional:  No weight loss, No fever, No chills, No night sweats.  Energy level good.  Eyes:  No impairment or change in vision  ENT / Mouth:  No pain, abnormal ulceration, bleeding, nasal drip or change in voice or hearing  Cardiovascular:  No chest pain, palpitations, new edema, or calf discomfort  Respiratory:  No pain, hemoptysis, change to breathing  Breast:  No pain, discharge, change in appearance or texture  Gastrointestinal:  No pain, cramping, jaundice, change to eating and bowel habits  Urinary:  No pain, bleeding or change in continence  Genitalia: No pain, bleeding or discharge  Musculoskeletal:  No redness, pain, edema or weakness  Skin:  No pruritus, rash, change to nodules or lesions  Neurologic:  No discomfort, change in mental status, speech, sensory or motor activity  Psychiatric:  No change in concentration or change to affect or mood  Endocrine:  No hot flashes, increased thirst, or change to urine production  Hematologic: No petechiae, ecchymosis or bleeding  Lymphatic:  No lymphadenopathy or lymphedema  Allergy / Immunologic:  No eczema, hives, frequent or recurrent infections    OBJECTIVE        Physical    VITALS:  Patient Vitals for the past 24 hrs:   BP Temp Temp src Pulse Resp SpO2 Weight   03/28/24 0949 -- -- -- -- -- 94 % --   03/28/24 0708 (!) 143/78 98.3 °F (36.8 °C) Oral 70 16 93 % --   03/28/24 0517 (!) 151/85 98 °F (36.7 °C) Oral 77 16 93 % 104.8 kg (231 lb 0.7 oz)   03/27/24 2359 126/86 97.8 °F (36.6 °C) Oral 71 18 92 % --   03/27/24 2333 (!) 153/72 98.2 °F (36.8 °C) Oral 76 16 93 % --   03/27/24 1905 (!) 136/59 98 °F (36.7 °C) Oral 77 18 94 % --   03/27/24 1712 (!) 166/72 97.9 °F (36.6 °C) Oral 78 18 94 % --   03/27/24 1630 (!) 167/70 97.9 °F (36.6 °C) -- 70 17 -- 105.9 kg (233 
    Pt Name: Jovan Awad  Medical Record Number: [id number]  Date of Birth 1940   Today's Date: 3/27/2024      Subjective:  patient resting in bed this AM. UOP pink-tinged this AM, GH significantly improved in the last day.    ROS: Constitutional: No fever    Vitals:  Vitals:    03/26/24 1758 03/27/24 0030 03/27/24 0306 03/27/24 0504   BP: (!) 174/82 (!) 157/91 (!) 148/65    Pulse:  70 74    Resp:  16 16    Temp:  98.1 °F (36.7 °C) 98.5 °F (36.9 °C)    TempSrc:  Oral Oral    SpO2:  92% 91%    Weight:    107.2 kg (236 lb 5.3 oz)   Height:         I/O last 3 completed shifts:  In: 860 [P.O.:360]  Out: 2300 [Urine:800]    Exam:  General: Awake, oriented, no acute distress  Respiratory: Nonlabored breathing  Abdomen: Soft, non-tender, non-distended, no masses  : avendaño catheter draining pink UOP  Skin: Skin color, texture, turgor normal, no rashes or lesions  Neurologic: no gross deficits    CURRENT MEDICATIONS   Scheduled Meds:   Fidaxomicin  200 mg Oral BID    atorvastatin  20 mg Oral Daily    levothyroxine  50 mcg Oral Daily    sodium chloride flush  5-40 mL IntraVENous 2 times per day    levETIRAcetam  500 mg Oral Daily    finasteride  5 mg Oral Daily     Continuous Infusions:   dextrose      [Held by provider] sodium bicarbonate 75 mEq in sodium chloride 0.45 % 1,000 mL infusion Stopped (03/26/24 1042)    sodium chloride       PRN Meds:.heparin (porcine), hydrALAZINE, glucose, dextrose bolus **OR** dextrose bolus, glucagon (rDNA), dextrose, sodium chloride flush, sodium chloride, polyethylene glycol, acetaminophen **OR** acetaminophen    LABS     Recent Labs     03/24/24  0951 03/24/24  1320 03/24/24  1423 03/25/24  0017 03/25/24  0512 03/25/24  1058 03/25/24  1208 03/26/24  0024 03/26/24  0558 03/27/24  0524 03/27/24  0525   WBC 4.1  --   --  5.2 4.6  --   --   --  4.3  --  5.4   HGB 8.2*  --   --  7.2* 7.1* 7.5*  --   --  7.4*  --  8.6*   HCT 24.6*  --   --  21.0* 20.7* 22.7*  --   --  22.0*  --  25.2* 
    Referring Physician: Dr. BEN Yeh  Reason for Consultation: \"Suspected CHF\"  Chief Complaint: Fatigue    Subjective:   History of Present Illness:  Jovan Awad is a 83 y.o. patient who presented to the hospital with complaints of generalized weakness, fatigue, and diarrhea.  The patient was found to be in ARIANA with a creatinine of 8.  Nephrology was consulted and the plan is to initiate dialysis therapy tomorrow.  The patient has a history of paroxysmal atrial fibrillation and follows with cardiology through [place].  He was on dofetilide but denies any prior intolerance of other antiarrhythmics.  Notes reviewed through care everywhere.  He also had reduced ejection fraction as low as 30-35% in 2017 but his most recent echo reports normal systolic function.  The patient endorses chronic lower extremity edema and has been on Lasix as well.  He denies associated chest pains.  The patient tested positive for C. difficile.    Interval history:  No acute overnight cardiac events.  Patient remains in sinus rhythm.  He is currently in dialysis.  He has no specific cardiac sounding complaints at this time.  Specifically denies chest pain, palpitations, or lightheadedness.    Past Medical History:   has a past medical history of Atrial fibrillation (HCC), CHF (congestive heart failure) (HCC), Hypertension, Seizures (HCC), Skin tear of right elbow without complication, and Thyroid disease.    Surgical History:   has a past surgical history that includes Cataract removal with implant (Left); knee surgery (Right); hernia repair; Colonoscopy; Intracapsular cataract extraction (Right, 05/20/2021); and IR NONTUNNELED VASCULAR CATHETER > 5 YEARS (Right, 03/26/2024).     Social History:   reports that he quit smoking about 38 years ago. His smoking use included cigarettes. He has never used smokeless tobacco. He reports current alcohol use. He reports that he does not use drugs.     Family History:  family history includes No 
    V2.0    [facility] [place] Progress Note      Name:  Jovan Awad /Age/Sex: 1940  (83 y.o. male)   MRN & CSN:  [id number] & [id number] Encounter Date/Time: 3/28/2024 9:43 PM EDT   Location:  [id number] PCP: Bonny Werner PA-C     Attending:Tyree Paul MD       Hospital Day: 5    Assessment and Recommendations   Jovan Awad is a 83 y.o. male with pmh of recent UTI that required antibiotic therapy by mouth, who came in complaining of fatigue, recurrent diarrhea every time he tried to move, generalized weakness, he was evaluated in the emergency room and was noted to have abnormal urinalysis consistent with UTI, with a creatinine of 8 who presents with ARIANA (acute kidney injury) (HCC)      Plan:   C. difficile infection present on admission, currently being treated by GI with Dificid he is down to 3 episodes of diarrhea per day.  Acute renal failure nonoliguric, was placed on bicarb drip on arrival, hold nephrotoxic agent, he was initiated on dialysis 3/26/2024,  Hyperkalemia secondary to renal failure improved with hemodialysis  None anion gap acidemia improved with dialysis  Urinary retention required Paulino catheter placement  Paroxysmal A-fib patient was Tikosyn before admission and Coumadin  Coagulopathy on admission his Coumadin was stopped.  Anemia multifactorial precipitated by his renal failure      Diet ADULT DIET; Regular; No Added Salt (3-4 gm); Low Potassium (Less than 3000 mg/day)   DVT Prophylaxis [] Lovenox, []  Heparin, [x] SCDs, [] Ambulation,  [] Eliquis, [] Xarelto  [] Coumadin   Code Status Full Code   Disposition From: Home  Expected Disposition: Rehab  Estimated Date of Discharge: Not ready  Patient requires continued admission due to renal failure    Surrogate Decision Maker/ POA  son     Personally reviewed Lab Studies and Imaging         Subjective:     Chief Complaint: Generalized weakness with recurrent diarrhea    Jovan Awad is a 83 y.o. male with past medical history for 
    V2.0    [facility] Progress Note      Name:  Jovan Awad /Age/Sex: 1940  (83 y.o. male)   MRN & CSN:  [id number] & [id number] Encounter Date/Time: 2024 9:43 PM EDT   Location:  [id number] PCP: Bonny Werner PA-C     Attending:Shyla Cruz MD       Hospital Day: 9    Assessment and Recommendations   Jovan Awad is a 83 y.o. male with pmh of recent UTI that required antibiotic therapy by mouth, who came in complaining of fatigue, recurrent diarrhea every time he tried to move, generalized weakness, he was evaluated in the emergency room and was noted to have abnormal urinalysis consistent with UTI, with a creatinine of 8 who presents with ARIANA (acute kidney injury) (HCC)      Plan 3/28/24  C. difficile infection present on admission, currently being treated by GI with Dificid he is down to 3 episodes of diarrhea per day.  Acute renal failure nonoliguric, was placed on bicarb drip on arrival, hold nephrotoxic agent, he was initiated on dialysis 3/26/2024,  Hyperkalemia secondary to renal failure improved with hemodialysis  None anion gap acidemia improved with dialysis  Urinary retention required Paulino catheter placement  Paroxysmal A-fib patient was Tikosyn before admission and Coumadin  Coagulopathy on admission his Coumadin was stopped.  Anemia multifactorial precipitated by his renal failure    Plan 3/29/24  C. difficile: Seem to be improved, patient is not symptomatic, has no diarrhea today.  Patient has been cleared by GI to for discharge  Acute renal failure nonoliguric, he is on hemodialysis: Plan for discharge next week as recommended per nephrology, if no evidence of recovery over the weekend and TDC next week.  Paroxysmal A-fib, off anticoagulation currently because of recent hematuria.  Urinary retention status post Paulino catheter insertion.    Plan 3/30/24  C. difficile infection: Patient said that he has no further diarrhea, symptoms has improved  Acute nonoliguric renal failure 
    V2.0    [facility] [place] Progress Note      Name:  Jovan Awad /Age/Sex: 1940  (83 y.o. male)   MRN & CSN:  [id number] & [id number] Encounter Date/Time: 3/31/2024 9:43 PM EDT   Location:  [id number] PCP: Bonny Werner PA-C     Attending:Shyla Cruz MD       Hospital Day: 8    Assessment and Recommendations   Jovan Awad is a 83 y.o. male with pmh of recent UTI that required antibiotic therapy by mouth, who came in complaining of fatigue, recurrent diarrhea every time he tried to move, generalized weakness, he was evaluated in the emergency room and was noted to have abnormal urinalysis consistent with UTI, with a creatinine of 8 who presents with ARINAA (acute kidney injury) (HCC)      Plan 3/28/24  C. difficile infection present on admission, currently being treated by GI with Dificid he is down to 3 episodes of diarrhea per day.  Acute renal failure nonoliguric, was placed on bicarb drip on arrival, hold nephrotoxic agent, he was initiated on dialysis 3/26/2024,  Hyperkalemia secondary to renal failure improved with hemodialysis  None anion gap acidemia improved with dialysis  Urinary retention required Paulino catheter placement  Paroxysmal A-fib patient was Tikosyn before admission and Coumadin  Coagulopathy on admission his Coumadin was stopped.  Anemia multifactorial precipitated by his renal failure    Plan 3/29/24  C. difficile: Seem to be improved, patient is not symptomatic, has no diarrhea today.  Patient has been cleared by GI to for discharge  Acute renal failure nonoliguric, he is on hemodialysis: Plan for discharge next week as recommended per nephrology, if no evidence of recovery over the weekend and TDC next week.  Paroxysmal A-fib, off anticoagulation currently because of recent hematuria.  Urinary retention status post Paulino catheter insertion.    Plan 3/30/24  C. difficile infection: Patient said that he has no further diarrhea, symptoms has improved  Acute nonoliguric renal failure 
    V2.0    [facility] [place] Note      Name:  Jovan Awad /Age/Sex: 1940  (83 y.o. male)   MRN & CSN:  [id number] & [id number] Encounter Date/Time: 3/30/2024 9:43 PM EDT   Location:  [id number] PCP: Bonny Werner PA-C     Attending:Tyree Paul MD       Hospital Day: 7    Assessment and Recommendations   Jovan Awad is a 83 y.o. male with pmh of recent UTI that required antibiotic therapy by mouth, who came in complaining of fatigue, recurrent diarrhea every time he tried to move, generalized weakness, he was evaluated in the emergency room and was noted to have abnormal urinalysis consistent with UTI, with a creatinine of 8 who presents with ARIANA (acute kidney injury) (HCC)      Plan 3/28/24  C. difficile infection present on admission, currently being treated by GI with Dificid he is down to 3 episodes of diarrhea per day.  Acute renal failure nonoliguric, was placed on bicarb drip on arrival, hold nephrotoxic agent, he was initiated on dialysis 3/26/2024,  Hyperkalemia secondary to renal failure improved with hemodialysis  None anion gap acidemia improved with dialysis  Urinary retention required Paulino catheter placement  Paroxysmal A-fib patient was Tikosyn before admission and Coumadin  Coagulopathy on admission his Coumadin was stopped.  Anemia multifactorial precipitated by his renal failure    Plan 3/29/24  C. difficile: Seem to be improved, patient is not symptomatic, has no diarrhea today.  Patient has been cleared by GI to for discharge  Acute renal failure nonoliguric, he is on hemodialysis: Plan for discharge next week as recommended per nephrology, if no evidence of recovery over the weekend and TDC next week.  Paroxysmal A-fib, off anticoagulation currently because of recent hematuria.  Urinary retention status post Paulino catheter insertion.    Plan 3/30/24  C. difficile infection: Patient said that he has no further diarrhea, symptoms has improved  Acute nonoliguric renal failure 
    V2.0    [facility] [place] Progress Note      Name:  Jovan Awad /Age/Sex: 1940  (83 y.o. male)   MRN & CSN:  [id number] & [id number] Encounter Date/Time: 3/27/2024 5:33 PM EDT   Location:  [id number] PCP: Bonny Werner PA-C     Attending:Carissa Yeh MD       Hospital Day: 4    Assessment and Recommendations   Jovan Awad is a 83 y.o. male who presents with ARIANA (acute kidney injury) (HCC)      Plan:     C diff infection:  present on admission  - started on difficid on admission by GI  - may need to add oral vancomycin if not improving    ARIANA:  worsening  - due to C dif infection with one month of diarrhea  - stop sodium bicarbonate drip due to swelling  - pharmacy adjusting medications for renal clearance  - hold lasix and lisnipopril  - first dialysis on 3/26    Paroxysmal Afib:    - d/c dofetilide as contraindicated in dialysis patients  - cardiology consulted --> if develops tachycardia would start amiodarone as he did not tolerate beta blocker in the past     Anion gap metabolic acidosis:  improving  - due to renal failure  - s/p  bicarb drip     Hyperkalemia:  improved  - no peaked T waves on ECG  - sodium bicarb drip  - lokelma and insulin with dextrose    Severe anemia  Thrombocytopenia  - labs indicate anemia of chronic disease  - stool occult negative  - could be due to hematuria with warfarin use  - hem/onc following --> consider bone marrow biopsy outpatient if not improving    Hematuria:  Urinary retention  - urology consulted --> no acute workup, needs outpatient f/u     Acute diastolic CHF  Elevated BNP  Orthopnea  - Echo obtained with normal EF  - cardiology consulted and signed off     Supratherapeutic INR:  - s/p vitamin K  - pharmacy to dose coumadin       Seizures:  pharmacy adjusting Keppra dose  Hypothyroidism:  cont synthroid      Diet ADULT DIET; Regular; No Added Salt (3-4 gm); Low Potassium (Less than 3000 mg/day)   DVT Prophylaxis [] Lovenox, []  Heparin, [] SCDs, [] Ambulation,  
    V2.0    [facility] [place]      Name:  Jovan Awad /Age/Sex: 1940  (83 y.o. male)   MRN & CSN:  [id number] & [id number] Encounter Date/Time: 3/29/2024 9:43 PM EDT   Location:  [id number] PCP: Bonny Werner PA-C     Attending:Tyree Paul MD       Hospital Day: 6    Assessment and Recommendations   Jovan Awad is a 83 y.o. male with pmh of recent UTI that required antibiotic therapy by mouth, who came in complaining of fatigue, recurrent diarrhea every time he tried to move, generalized weakness, he was evaluated in the emergency room and was noted to have abnormal urinalysis consistent with UTI, with a creatinine of 8 who presents with ARIANA (acute kidney injury) (HCC)      Plan 3/28/24  C. difficile infection present on admission, currently being treated by GI with Dificid he is down to 3 episodes of diarrhea per day.  Acute renal failure nonoliguric, was placed on bicarb drip on arrival, hold nephrotoxic agent, he was initiated on dialysis 3/26/2024,  Hyperkalemia secondary to renal failure improved with hemodialysis  None anion gap acidemia improved with dialysis  Urinary retention required Paulino catheter placement  Paroxysmal A-fib patient was Tikosyn before admission and Coumadin  Coagulopathy on admission his Coumadin was stopped.  Anemia multifactorial precipitated by his renal failure    Plan 3/28/24  C. difficile: Seem to be improved, patient is not symptomatic, has no diarrhea today.  Patient has been cleared by GI to for discharge  Acute renal failure nonoliguric, he is on hemodialysis: Plan for discharge next week as recommended per nephrology, if no evidence of recovery over the weekend and TDC next week.  Paroxysmal A-fib, off anticoagulation currently because of recent hematuria.  Urinary retention status post Paulino catheter insertion    Diet ADULT DIET; Regular; No Added Salt (3-4 gm); Low Potassium (Less than 3000 mg/day)   DVT Prophylaxis [] Lovenox, []  Heparin, [x] SCDs, 
  [facility]  Palliative Care   Progress Note    NAME:  Jovan Awad  MEDICAL RECORD NUMBER:  [id number]  AGE: 83 y.o.   GENDER: male  : 1940  TODAY'S DATE:  3/26/2024    Aware of consult. Patient out of room will see as time allows.     I will continue to follow Mr. Awad's care as needed.      Thank you for allowing me to participate in the care of Mr. Awad .     Electronically signed by Destinee Cheung, RN, BSN,CHPN on 3/26/2024 at 7:02 PM  Palliative Care Nurse [facility]  Office: [phone]      
  Nephrology Progress Note   [facility]      Reason for consultation: ARIANA    Subjective:    The patient has been seen and examined. Labs and chart reviewed. Seen on HD -- net 2 L UF and 3 K bath. Plans for TDC today at 3 pm.    There were not complications overnight.    Patient review of systems: Denies SOB.      Allergies:  No Known Allergies     Scheduled Meds:   iron sucrose  200 mg IntraVENous Daily    epoetin funmilayo-epbx  10,000 Units IntraVENous Once per day on     Virt-Caps  1 capsule Oral Daily    Fidaxomicin  200 mg Oral BID    atorvastatin  20 mg Oral Daily    levothyroxine  50 mcg Oral Daily    sodium chloride flush  5-40 mL IntraVENous 2 times per day    levETIRAcetam  500 mg Oral Daily    finasteride  5 mg Oral Daily        dextrose      sodium chloride         PRN Meds:heparin (porcine), hydrALAZINE, glucose, dextrose bolus **OR** dextrose bolus, glucagon (rDNA), dextrose, sodium chloride flush, sodium chloride, polyethylene glycol, acetaminophen **OR** acetaminophen    Physical Exam:    TEMPERATURE:  Current - Temp: 98.1 °F (36.7 °C); Max - Temp  Av.4 °F (36.9 °C)  Min: 98.1 °F (36.7 °C)  Max: 99.4 °F (37.4 °C)  RESPIRATIONS RANGE: Resp  Av.8  Min: 16  Max: 20  PULSE RANGE: Pulse  Av.8  Min: 70  Max: 93  BLOOD PRESSURE RANGE:  Systolic (24hrs), Av , Min:140 , Max:177   ; Diastolic (24hrs), Av, Min:69, Max:87    24HR INTAKE/OUTPUT:    Intake/Output Summary (Last 24 hours) at 2024 1419  Last data filed at 2024 0600  Gross per 24 hour   Intake 350 ml   Output 710 ml   Net -360 ml       Patient Vitals for the past 96 hrs (Last 3 readings):   Weight   24 1038 101.5 kg (223 lb 12.3 oz)   24 0421 101.5 kg (223 lb 12.3 oz)   24 0546 98 kg (216 lb 0.8 oz)       General:  NAD, A+Ox3  Chest:  CTAB  CVS:  RRR  Abdominal:  NTND, soft, +BS  Extremities:  no edema  Skin:  no rash  Access: L temp vas cath    LAB DATA:    CBC:   Lab Results   Component Value 
  Nephrology Progress Note   [facility]      Reason for consultation: ARIANA    Subjective:    The patient has been seen and examined. Labs and chart reviewed. HD today with TW of 104 kg.    There were not complications overnight.    Patient review of systems: Denies SOB.    Allergies:  No Known Allergies     Scheduled Meds:   iron sucrose  200 mg IntraVENous Daily    epoetin funmilayo-epbx  10,000 Units IntraVENous Once per day on     Virt-Caps  1 capsule Oral Daily    Fidaxomicin  200 mg Oral BID    atorvastatin  20 mg Oral Daily    levothyroxine  50 mcg Oral Daily    sodium chloride flush  5-40 mL IntraVENous 2 times per day    levETIRAcetam  500 mg Oral Daily    finasteride  5 mg Oral Daily        dextrose      sodium chloride         PRN Meds:heparin (porcine), hydrALAZINE, glucose, dextrose bolus **OR** dextrose bolus, glucagon (rDNA), dextrose, sodium chloride flush, sodium chloride, polyethylene glycol, acetaminophen **OR** acetaminophen    Physical Exam:    TEMPERATURE:  Current - Temp: 98.2 °F (36.8 °C); Max - Temp  Av.1 °F (36.7 °C)  Min: 97.7 °F (36.5 °C)  Max: 98.5 °F (36.9 °C)  RESPIRATIONS RANGE: Resp  Av.8  Min: 14  Max: 20  PULSE RANGE: Pulse  Av.7  Min: 65  Max: 84  BLOOD PRESSURE RANGE:  Systolic (24hrs), Av , Min:104 , Max:166   ; Diastolic (24hrs), Av, Min:51, Max:87    24HR INTAKE/OUTPUT:    Intake/Output Summary (Last 24 hours) at 4/3/2024 1137  Last data filed at 4/3/2024 0934  Gross per 24 hour   Intake 250 ml   Output 300 ml   Net -50 ml         Patient Vitals for the past 96 hrs (Last 3 readings):   Weight   24 0615 91.6 kg (201 lb 15.1 oz)   24 0531 101.2 kg (223 lb 1.7 oz)   24 1357 100 kg (220 lb 7.4 oz)         General:  NAD, A+Ox3  Chest:  CTAB  CVS:  RRR  Abdominal:  NTND, soft, +BS  Extremities:  no edema  Skin:  no rash  Access: R TDC.    LAB DATA:    CBC:   Lab Results   Component Value Date/Time    WBC 5.9 2024 05:16 AM    RBC 
  Physician Progress Note      PATIENT:               NATALIE LUEVANO  [facility] #:                  [id number]  :                       1940  ADMIT DATE:       3/24/2024 9:34 AM  DISCH DATE:  RESPONDING  PROVIDER #:        Carissa Yeh MD          QUERY TEXT:    Pt admitted with sob. Pt noted to have ariana. If possible, please document in   the progress notes and discharge summary if you are evaluating and/or treating   any of the following:    The medical record reflects the following:  Risk Factors: recent tx for UTI,  Clinical Indicators: documented ARIANA per H/P, consults, decreased urine output,   fluid retention, hyperkalemia, metabolic acidosis, Creatinine 8.0 on   admission 3/24, 8.8 3/25, per bladder scan >350ml  Treatment: Bicarb gtt, Neph consult, IVF    Thank you,  Emma Langston RN BSN  Clinical   [email or web address]          Defined by Kidney Disease Improving Global Outcomes (KDIGO) clinical practice   guideline for acute kidney injury:  -Increase in SCr by greater than or equal to 0.3 mg/dl within 48 hours; or  -Increase or decrease in SCr to greater than or equal to 1.5 times baseline,   which is known or presumed to have occurred within the prior 7 days; or  -Urine volume < 0.5ml/kg/h for 6 hours  Options provided:  -- Acute kidney failure with acute tubular necrosis  -- Other - I will add my own diagnosis  -- Disagree - Not applicable / Not valid  -- Disagree - Clinically unable to determine / Unknown  -- Refer to Clinical Documentation Reviewer    PROVIDER RESPONSE TEXT:    This patient is in Acute kidney failure with acute tubular necrosis.    Query created by: Emma Langston on 3/25/2024 1:45 PM      QUERY TEXT:    Pt admitted with diarrhea. Noted documentation of C-diff colitis by GI consult   on 3/25 ordered Dificid. If possible, please document in progress notes and   discharge summary:    The medical record reflects the following:  Risk Factors: Diarrhea, 
4 Eyes Skin Assessment     NAME:  Jovan Awad  YOB: 1940  MEDICAL RECORD NUMBER:  [id number]    The patient is being assessed for  Admission    I agree that at least one RN has performed a thorough Head to Toe Skin Assessment on the patient. ALL assessment sites listed below have been assessed.      Areas assessed by both nurses:    Head, Face, Ears, Shoulders, Back, Chest, Arms, Elbows, Hands, Sacrum. Buttock, Coccyx, Ischium, Legs. Feet and Heels, and Under Medical Devices         Does the Patient have a Wound? No noted wound(s)       Tucker Prevention initiated by RN: No  Wound Care Orders initiated by RN: No    Pressure Injury (Stage 3,4, Unstageable, DTI, NWPT, and Complex wounds) if present, place Wound referral order by RN under : No   Patient does however have small non-blanchable center bruising to R mid buttocks with purple middle spotting, and yellowish hue surrounding - collectively the size of quarter. Patient reports possibly bumping buttocks at home. Skin assessment in flowsheets updated appropriately.    New Ostomies, if present place, Ostomy referral order under : No     Nurse 1 eSignature: Electronically signed by Kelly Cassidy RN on 3/24/24 at 7:10 PM EDT    **SHARE this note so that the co-signing nurse can place an eSignature**    Nurse 2 eSignature: Electronically signed by Randi Levin RN on 3/25/24 at 5:48 AM EDT    
Clinical Pharmacy Note    Jovan Awad is a 83 y.o. male receiving warfarin managed by pharmacy.      Warfarin Indication: afib  Target INR range: 2-3   Dose prior to admission: 3 mg daily (INR supratherapeutic on admission)    Current warfarin drug-drug interactions: vitamin K 10 mg PO given 3/25/24    Recent Labs     04/01/24  0607 04/01/24  0609 04/02/24  0522 04/03/24  0516   HGB 7.8*  --  8.1* 8.0*   HCT 22.1*  --  23.0* 23.6*   INR  --  1.49* 1.54* 1.57*       Assessment/Plan:    Warfarin has been on hold since admission. INR subtherapeutic.    Warfarin 2 mg today before discharge.     Bonny Bradley PharmD  4/3/2024 1:49 PM    
Clinical Pharmacy Note  Warfarin Consult    Jovan Awad is a 83 y.o. male receiving warfarin managed by pharmacy.  Patient being bridged with: none    Warfarin Indication: AFIB  Target INR range: 2-3  Dose prior to admission: 3 mg po daily    Current warfarin drug-drug interactions: none of clinical significance    Recent Labs     03/24/24  0951 03/25/24  0017 03/25/24  0512   HGB 8.2* 7.2* 7.1*   HCT 24.6* 21.0* 20.7*   INR 5.48*  --  5.86*         Assessment/Plan:  Hold Warfarin again tonight. Daily PT/INR until stable within therapeutic range.     Thank you for the consult.  Will continue to follow.     Juvenal Parker [facility]  3/25/2024 7:16 AM    
Comprehensive Nutrition Assessment    Type and Reason for Visit:  Initial    Nutrition Recommendations/Plan:   Restart LIZ, low potassium diet when appropriate per provider     Malnutrition Assessment:  Malnutrition Status:  No malnutrition (04/01/24 1053)    Context:  Acute Illness       Nutrition Assessment:    Length of stay. Pt presenting w/ ARIANA. Currently in dialysis. Pt w/ fluctuating wts, fluid changes likely contributing. While inpatient, pt eating well, mostly finishing meals. Pt able to meet energy/protein needs through meal intake. Nutrtion related labs reviewed. No nutrition concerns at this time, continue current diet order.    Nutrition Related Findings:    126 glucose, 3/29 BM Wound Type: None       Current Nutrition Intake & Therapies:    Average Meal Intake: %  Average Supplements Intake: None Ordered  Diet NPO Exceptions are: Sips of Water with Meds    Anthropometric Measures:  Height: 188 cm (6' 2\")  Ideal Body Weight (IBW): 190 lbs (86 kg)       Current Body Weight: 101.5 kg (223 lb 12.3 oz),   IBW.    Current BMI (kg/m2): 28.7                               Estimated Daily Nutrient Needs:  Energy Requirements Based On: Kcal/kg  Weight Used for Energy Requirements: Current  Energy (kcal/day): 2030 - 2538 (20 - 25 kcal/kg)  Weight Used for Protein Requirements: Ideal  Protein (g/day): 112 - 130 (1.3 - 1.5 g/kg IBW)  Method Used for Fluid Requirements: 1 ml/kcal  Fluid (ml/day): or per provider    Nutrition Diagnosis:   No nutrition diagnosis at this time     Nutrition Interventions:   Food and/or Nutrient Delivery: Continue Current Diet  Nutrition Education/Counseling: Education not indicated  Coordination of Nutrition Care: Continue to monitor while inpatient       Goals:     Goals: Meet at least 75% of estimated needs, by next RD assessment       Nutrition Monitoring and Evaluation:   Behavioral-Environmental Outcomes: None Identified  Food/Nutrient Intake Outcomes: Food and Nutrient 
Dr. Yeh notified of patient's elevated blood pressure post dialysis. Awaiting reply.  
Family at bedside, thorough education provided on infection precautions and the important need to wash hands with soap and water when leaving room or before touching face/food/ect. Family verbalized understanding.     Electronically signed by Kelly Cassidy RN on 3/25/2024 at 8:04 PM    
INPATIENT PROGRESS NOTE        IDENTIFYING DATA/REASON FOR CONSULTATION   PATIENT:  Jovan Awad  MRN:  [id number]  ADMIT DATE: 3/24/2024  TIME OF EVALUATION: 3/26/2024 7:08 AM  HOSPITAL STAY:   LOS: 2 days   CONSULTING PHYSICIAN: Carissa Yeh MD   REASON FOR CONSULTATION: diarrhea    Subjective:    Patient seen in follow up  He reports he is feeling better  He is having less diarrhea.  He reports 2.5 bms yesterday  He denies abd pain    MEDICATIONS   SCHEDULED:  Fidaxomicin, 200 mg, BID  atorvastatin, 20 mg, Daily  levothyroxine, 50 mcg, Daily  sodium chloride flush, 5-40 mL, 2 times per day  levETIRAcetam, 500 mg, Daily  finasteride, 5 mg, Daily      FLUIDS/DRIPS:     dextrose      sodium bicarbonate 75 mEq in sodium chloride 0.45 % 1,000 mL infusion 75 mL/hr at 24    sodium chloride       PRNs: glucose, 4 tablet, PRN  dextrose bolus, 125 mL, PRN   Or  dextrose bolus, 250 mL, PRN  glucagon (rDNA), 1 mg, PRN  dextrose, , Continuous PRN  sodium chloride flush, 5-40 mL, PRN  sodium chloride, , PRN  polyethylene glycol, 17 g, Daily PRN  acetaminophen, 650 mg, Q6H PRN   Or  acetaminophen, 650 mg, Q6H PRN      ALLERGIES:  No Known Allergies      PHYSICAL EXAM   VITALS:  BP (!) 151/75   Pulse 72   Temp 98.6 °F (37 °C) (Oral)   Resp 24   Ht 1.88 m (6' 2\")   Wt 106.9 kg (235 lb 10.8 oz)   SpO2 95%   BMI 30.26 kg/m²   TEMPERATURE:  Current - Temp: 98.6 °F (37 °C); Max - Temp  Av.2 °F (36.8 °C)  Min: 97.3 °F (36.3 °C)  Max: 98.8 °F (37.1 °C)    Physical Exam:  General appearance: alert, cooperative, no distress, appears stated age  Eyes: Anicteric  Head: Normocephalic, without obvious abnormality  Lungs: clear to auscultation bilaterally, Normal Effort  Heart: regular rate and rhythm, normal S1 and S2, no murmurs or rubs  Abdomen: soft, non-distended, non-tender. Bowel sounds normal.   Extremities: atraumatic, no cyanosis or edema  Skin: warm and dry, no jaundice  Neuro: Grossly intact, A&OX3    LABS 
INPATIENT PROGRESS NOTE        IDENTIFYING DATA/REASON FOR CONSULTATION   PATIENT:  Jovan Awad  MRN:  [id number]  ADMIT DATE: 3/24/2024  TIME OF EVALUATION: 3/29/2024 10:10 AM  HOSPITAL STAY:   LOS: 5 days   CONSULTING PHYSICIAN: Tyree Paul MD   REASON FOR CONSULTATION: diarrhea    Subjective:    Patient seen in follow up.  He reports he is feeling better.  He had 3 formed bowel movements yesterday.  He denies abdominal pain.  He is tolerating food.    MEDICATIONS   SCHEDULED:  Fidaxomicin, 200 mg, BID  atorvastatin, 20 mg, Daily  levothyroxine, 50 mcg, Daily  sodium chloride flush, 5-40 mL, 2 times per day  levETIRAcetam, 500 mg, Daily  finasteride, 5 mg, Daily      FLUIDS/DRIPS:     dextrose      [Held by provider] sodium bicarbonate 75 mEq in sodium chloride 0.45 % 1,000 mL infusion Stopped (24 1042)    sodium chloride       PRNs: heparin (porcine), 2,400 Units, PRN  hydrALAZINE, 5 mg, Q6H PRN  glucose, 4 tablet, PRN  dextrose bolus, 125 mL, PRN   Or  dextrose bolus, 250 mL, PRN  glucagon (rDNA), 1 mg, PRN  dextrose, , Continuous PRN  sodium chloride flush, 5-40 mL, PRN  sodium chloride, , PRN  polyethylene glycol, 17 g, Daily PRN  acetaminophen, 650 mg, Q6H PRN   Or  acetaminophen, 650 mg, Q6H PRN      ALLERGIES:  No Known Allergies      PHYSICAL EXAM   VITALS:  BP (!) 160/71   Pulse 71   Temp 98.1 °F (36.7 °C) (Oral)   Resp 16   Ht 1.88 m (6' 2\")   Wt 99.6 kg (219 lb 9.3 oz)   SpO2 94%   BMI 28.19 kg/m²   TEMPERATURE:  Current - Temp: 98.1 °F (36.7 °C); Max - Temp  Av °F (36.7 °C)  Min: 97.6 °F (36.4 °C)  Max: 98.3 °F (36.8 °C)    Physical Exam:  General appearance: alert, cooperative, no distress, appears stated age  Eyes: Anicteric  Head: Normocephalic, without obvious abnormality  Lungs: clear to auscultation bilaterally, Normal Effort  Heart: regular rate and rhythm, normal S1 and S2, no murmurs or rubs  Abdomen: soft, non-distended, non-tender. Bowel sounds normal.   Extremities: 
MD notified of patient PVC's and runs of vtach at [facility] patient is not symptomatic and is HD at this time with no complaints of voiced and no distress noted MD states he will review   
Ne[facility]rology ([facility] Progress Note    CC: ARIANA    Subjective:    HPI:  Breathing unchanged.  No CP.   ROS:  In bed.  No fever.  PMFSH:  medications reviewed.      Objective:  Blood pressure (!) 141/69, pulse 70, temperature 98.3 °F (36.8 °C), temperature source Oral, resp. rate 18, height 1.88 m (6' 2\"), weight 98 kg (216 lb 0.8 oz), SpO2 94 %.    Intake/Output Summary (Last 24 hours) at 3/31/2024 1638  Last data filed at 3/31/2024 1432  Gross per 24 hour   Intake 490 ml   Output 350 ml   Net 140 ml       General:  NAD, A+Ox3  Chest:  CTAB  CVS:  RRR  Abdominal:  NTND, soft, +BS  Extremities:  no edema  Skin:  no rash    Labs:  Renal panel:  Lab Results   Component Value Date/Time     03/31/2024 11:58 AM    K 4.2 03/31/2024 11:58 AM    CO2 23 03/31/2024 11:58 AM    BUN 39 (H) 03/31/2024 11:58 AM    CREATININE 6.0 (HH) 03/31/2024 11:58 AM    CALCIUM 8.3 03/31/2024 11:58 AM    PHOS 4.1 03/31/2024 05:40 AM    MG 1.90 03/25/2024 12:17 AM     CBC:  Lab Results   Component Value Date/Time    WBC 6.1 03/31/2024 05:40 AM    HGB 7.7 (L) 03/31/2024 05:40 AM    HCT 22.1 (L) 03/31/2024 05:40 AM     03/31/2024 05:40 AM       Assessment/Plan:  Reviewed old records and labs.    1) ARIANA   - secondary to severe dehydration, lisinopril, and obstructive uropathy   - off lisinopril   - HD MWF   - no evidence for recovery over the weekend, then TDC tomorrow and will get an outpatient dialysis spot    - NPO after MN    2) c.diff.   - GI consulted   - on dificid    3) anemia   - PRBC prn   - hematology consulted    4) FEN   - hyperkalemia    - correct with HD   - metabolic acidosis    - monitor    5) coagulopathy   - s/p vitamin K   - will do temporary dialysis catheter for now and plan on tunneled dialysis catheter if he continues to need dialysis   - would not resume coumadin and just use heparin gtt in the meantime if he needs to be anticoagulated    pAF   - currently off anticoagulation  
Ne[facility]rology ([facility] Progress Note    CC: ARIANA    Subjective:    HPI:  Breathing unchanged.  No CP.  .  ROS:  In bed.  No fever.  PMFSH:  medications reviewed.      Objective:  Blood pressure (!) 143/78, pulse 70, temperature 98.3 °F (36.8 °C), temperature source Oral, resp. rate 16, height 1.88 m (6' 2\"), weight 104.8 kg (231 lb 0.7 oz), SpO2 94 %.    Intake/Output Summary (Last 24 hours) at 3/28/2024 1647  Last data filed at 3/28/2024 1010  Gross per 24 hour   Intake 370 ml   Output 675 ml   Net -305 ml       General:  NAD, A+Ox3  Chest:  CTAB  CVS:  RRR  Abdominal:  NTND, soft, +BS  Extremities:  no edema  Skin:  no rash    Labs:  Renal panel:  Lab Results   Component Value Date/Time     03/28/2024 02:06 PM    K 4.4 03/28/2024 02:06 PM    CO2 22 03/28/2024 02:06 PM    BUN 35 (H) 03/28/2024 02:06 PM    CREATININE 5.6 (HH) 03/28/2024 02:06 PM    CALCIUM 8.2 (L) 03/28/2024 02:06 PM    PHOS 3.1 03/28/2024 04:58 AM    MG 1.90 03/25/2024 12:17 AM     CBC:  Lab Results   Component Value Date/Time    WBC 5.6 03/28/2024 04:58 AM    HGB 7.8 (L) 03/28/2024 04:58 AM    HCT 22.6 (L) 03/28/2024 04:58 AM     (L) 03/28/2024 04:58 AM       Assessment/Plan:  Reviewed old records and labs.    1) ARIANA   - secondary to severe dehydration, lisinopril, and obstructive uropathy   - off lisinopril   - renal function unchanged   - next HD on Friday   - if no evidence for recovery over the weekend, then TDC next week    2) c.diff.   - GI consulted   - on dificid    3) anemia   - PRBC prn   - hematology consulted    4) FEN   - hyperkalemia    - correct with HD   - metabolic acidosis    - monitor    5) coagulopathy   - s/p vitamin K   - will do temporary dialysis catheter for now and plan on tunneled dialysis catheter if he continues to need dialysis   - would not resume coumadin and just use heparin gtt in the meantime if he needs to be anticoagulated  
Nephrology ([facility] Progress Note    CC: ARIANA    Subjective:    HPI:  Breathing unchanged.  No CP.  .Patient seen on dialysis.  ROS:  In bed.  No fever.  PMFSH:  medications reviewed.      Objective:  Blood pressure (!) 142/90, pulse 67, temperature 97.3 °F (36.3 °C), temperature source Oral, resp. rate 18, height 1.88 m (6' 2\"), weight 107.2 kg (236 lb 5.3 oz), SpO2 94 %.    Intake/Output Summary (Last 24 hours) at 3/27/2024 1256  Last data filed at 3/27/2024 1012  Gross per 24 hour   Intake 870 ml   Output 2050 ml   Net -1180 ml       General:  NAD, A+Ox3  Chest:  CTAB  CVS:  RRR  Abdominal:  NTND, soft, +BS  Extremities:  no edema  Skin:  no rash    Labs:  Renal panel:  Lab Results   Component Value Date/Time     03/27/2024 05:24 AM    K 4.9 03/27/2024 05:24 AM    K 4.9 03/27/2024 05:24 AM    CO2 21 03/27/2024 05:24 AM    BUN 59 (H) 03/27/2024 05:24 AM    CREATININE 6.7 (HH) 03/27/2024 05:24 AM    CALCIUM 8.3 03/27/2024 05:24 AM    PHOS 4.3 03/27/2024 05:24 AM    MG 1.90 03/25/2024 12:17 AM     CBC:  Lab Results   Component Value Date/Time    WBC 5.4 03/27/2024 05:25 AM    HGB 8.6 (L) 03/27/2024 05:25 AM    HCT 25.2 (L) 03/27/2024 05:25 AM     (L) 03/27/2024 05:25 AM       Assessment/Plan:  Reviewed old records and labs.    1) ARIANA   - secondary to severe dehydration, lisinopril, and obstructive uropathy   - off lisinopril   - renal function unchanged   - HD today as part of initiation   - likely next HD on Friday   - if no evidence for recovery over the weekend, then TDC next week    2) c.diff.   - GI consulted   - on dificid    3) anemia   - PRBC prn   - hematology consulted    4) FEN   - hyperkalemia    - correct with HD   - metabolic acidosis    - monitor    5) coagulopathy   - s/p vitamin K   - will do temporary dialysis catheter for now and plan on tunneled dialysis catheter if he needs dialysis   - would not resume coumadin and just use heparin gtt in the meantime if he 
Nephrology ([facility] Progress Note    CC: ARIANA    Subjective:    HPI:  Breathing unchanged.  No CP.  Patient seen on dialysis.  ROS:  In bed.  No fever.  PMFSH:  medications reviewed.      Objective:  Blood pressure (!) 143/70, pulse 72, temperature 98 °F (36.7 °C), temperature source Oral, resp. rate 18, height 1.88 m (6' 2\"), weight 98.6 kg (217 lb 6 oz), SpO2 95 %.    Intake/Output Summary (Last 24 hours) at 3/30/2024 1526  Last data filed at 3/30/2024 0500  Gross per 24 hour   Intake --   Output 300 ml   Net -300 ml       General:  NAD, A+Ox3  Chest:  CTAB  CVS:  RRR  Abdominal:  NTND, soft, +BS  Extremities:  no edema  Skin:  no rash    Labs:  Renal panel:  Lab Results   Component Value Date/Time     03/30/2024 12:30 PM    K 4.4 03/30/2024 12:30 PM    CO2 25 03/30/2024 12:30 PM    BUN 26 (H) 03/30/2024 12:30 PM    CREATININE 5.2 (HH) 03/30/2024 12:30 PM    CALCIUM 8.3 03/30/2024 12:30 PM    PHOS 2.9 03/30/2024 05:13 AM    MG 1.90 03/25/2024 12:17 AM     CBC:  Lab Results   Component Value Date/Time    WBC 6.3 03/30/2024 05:12 AM    HGB 8.2 (L) 03/30/2024 05:12 AM    HCT 23.7 (L) 03/30/2024 05:12 AM     03/30/2024 05:12 AM       Assessment/Plan:  Reviewed old records and labs.    1) ARIANA   - secondary to severe dehydration, lisinopril, and obstructive uropathy   - off lisinopril   - HD MWF   - if no evidence for recovery over the weekend, then TDC next week and will get an outpatient dialysis spot    2) c.diff.   - GI consulted   - on dificid    3) anemia   - PRBC prn   - hematology consulted    4) FEN   - hyperkalemia    - correct with HD   - metabolic acidosis    - monitor    5) coagulopathy   - s/p vitamin K   - will do temporary dialysis catheter for now and plan on tunneled dialysis catheter if he continues to need dialysis   - would not resume coumadin and just use heparin gtt in the meantime if he needs to be anticoagulated    pAF   - currently off anticoagulation  
Nephrology ([facility] Progress Note    CC: ARIANA    Subjective:    HPI:  Breathing unchanged.  No CP.  Patient seen on dialysis.  ROS:  In bed.  No fever.  PMFSH:  medications reviewed.      Objective:  Blood pressure (!) 152/78, pulse 73, temperature 97.8 °F (36.6 °C), resp. rate 18, height 1.88 m (6' 2\"), weight 99.6 kg (219 lb 9.3 oz), SpO2 94 %.    Intake/Output Summary (Last 24 hours) at 3/29/2024 1736  Last data filed at 3/29/2024 0928  Gross per 24 hour   Intake 10 ml   Output 475 ml   Net -465 ml       General:  NAD, A+Ox3  Chest:  CTAB  CVS:  RRR  Abdominal:  NTND, soft, +BS  Extremities:  no edema  Skin:  no rash    Labs:  Renal panel:  Lab Results   Component Value Date/Time     03/29/2024 11:48 AM    K 4.2 03/29/2024 11:48 AM    CO2 23 03/29/2024 11:48 AM    BUN 43 (H) 03/29/2024 11:48 AM    CREATININE 6.6 (HH) 03/29/2024 11:48 AM    CALCIUM 8.1 (L) 03/29/2024 11:48 AM    PHOS 3.4 03/29/2024 04:26 AM    MG 1.90 03/25/2024 12:17 AM     CBC:  Lab Results   Component Value Date/Time    WBC 5.6 03/29/2024 04:26 AM    HGB 8.0 (L) 03/29/2024 04:26 AM    HCT 23.0 (L) 03/29/2024 04:26 AM     (L) 03/29/2024 04:26 AM       Assessment/Plan:  Reviewed old records and labs.    1) ARIANA   - secondary to severe dehydration, lisinopril, and obstructive uropathy   - off lisinopril   - renal function unchanged   - HD MWF   - if no evidence for recovery over the weekend, then TDC next week and will get an outpatient dialysis spot    2) c.diff.   - GI consulted   - on dificid    3) anemia   - PRBC prn   - hematology consulted    4) FEN   - hyperkalemia    - correct with HD   - metabolic acidosis    - monitor    5) coagulopathy   - s/p vitamin K   - will do temporary dialysis catheter for now and plan on tunneled dialysis catheter if he continues to need dialysis   - would not resume coumadin and just use heparin gtt in the meantime if he needs to be anticoagulated  
Nephrology ([facility] Progress Note    CC: ARIANA    Subjective:    HPI:  Breathing unchanged.  No CP.  Renal function is not any better.  INR remains supratherapeutic.  ROS:  In bed.  No fever.  PMFSH:  medications reviewed.      Objective:  Blood pressure (!) 143/78, pulse 77, temperature 97.7 °F (36.5 °C), temperature source Oral, resp. rate 20, height 1.88 m (6' 2\"), weight 106.9 kg (235 lb 10.8 oz), SpO2 94 %.    Intake/Output Summary (Last 24 hours) at 3/26/2024 1335  Last data filed at 3/26/2024 1222  Gross per 24 hour   Intake --   Output 700 ml   Net -700 ml       General:  NAD, A+Ox3  Chest:  CTAB  CVS:  RRR  Abdominal:  NTND, soft, +BS  Extremities:  no edema  Skin:  no rash    Labs:  Renal panel:  Lab Results   Component Value Date/Time     03/26/2024 12:24 AM    K 5.9 (H) 03/26/2024 12:24 AM    CO2 19 (L) 03/26/2024 12:24 AM    BUN 80 (H) 03/26/2024 12:24 AM    CREATININE 8.3 (HH) 03/26/2024 12:24 AM    CALCIUM 7.9 (L) 03/26/2024 12:24 AM    PHOS 4.9 03/25/2024 05:12 AM    MG 1.90 03/25/2024 12:17 AM     CBC:  Lab Results   Component Value Date/Time    WBC 4.3 03/26/2024 05:58 AM    HGB 7.4 (L) 03/26/2024 05:58 AM    HCT 22.0 (L) 03/26/2024 05:58 AM    PLT 84 (L) 03/26/2024 05:58 AM       Assessment/Plan:  Reviewed old records and labs.    1) ARIANA   - secondary to severe dehydration, lisinopril, and obstructive uropathy   - off lisinopril   - renal function unchanged   - will plan on temporary dialysis catheter and start HD today    2) c.diff.   - GI consulted   - on dificid    3) anemia   - PRBC prn    4) FEN   - hyperkalemia    - correct with HD   - metabolic acidosis    - monitor    5) coagulopathy   - s/p vitamin K   - will do temporary dialysis catheter for now and plan on tunneled dialysis catheter if he needs dialysis   - would not resume coumadin and just use heparin gtt in the meantime if he needs to be anticoagulated  
Nephrology ([facility]) Progress Note    CC: ARIANA    Subjective:    HPI:  Breathing unchanged.  No CP.  Renal function is not any better.  INR remains supratherapeutic.  ROS:  In bed.  No fever.  PMFSH:  medications reviewed.  Grandson present.    Objective:  Blood pressure (!) 153/71, pulse 58, temperature 98.1 °F (36.7 °C), temperature source Oral, resp. rate 15, height 1.88 m (6' 2\"), weight 106.7 kg (235 lb 3.7 oz), SpO2 92 %.    Intake/Output Summary (Last 24 hours) at 3/25/2024 1343  Last data filed at 3/25/2024 0001  Gross per 24 hour   Intake 600 ml   Output 850 ml   Net -250 ml     General:  NAD, A+Ox3  Chest:  CTAB  CVS:  RRR  Abdominal:  NTND, soft, +BS  Extremities:  no edema  Skin:  no rash    Labs:  Renal panel:  Lab Results   Component Value Date/Time     03/25/2024 12:08 PM    K 5.3 (H) 03/25/2024 12:08 PM    CO2 18 (L) 03/25/2024 12:08 PM    BUN 80 (H) 03/25/2024 12:08 PM    CREATININE 8.2 (HH) 03/25/2024 12:08 PM    CALCIUM 8.0 (L) 03/25/2024 12:08 PM    PHOS 4.9 03/25/2024 05:12 AM    MG 1.90 03/25/2024 12:17 AM     CBC:  Lab Results   Component Value Date/Time    WBC 4.6 03/25/2024 05:12 AM    HGB 7.5 (L) 03/25/2024 10:58 AM    HCT 22.7 (L) 03/25/2024 10:58 AM    PLT 92 (L) 03/25/2024 05:12 AM       Assessment/Plan:  Reviewed old records and labs.    1) ARIANA   - secondary to severe dehydration, lisinopril, and obstructive uropathy   - off lisinopril   - renal function unchanged   - will give him one more day to declare himself   - will make NPO after MN in case he needs dialysis catheter    2) c.diff.   - GI consulted   - on dificid    3) anemia   - PRBC prn    4) FEN   - hyperkalemia    - monitor   - metabolic acidosis    - monitor    5) coagulopathy   - vitamin K   - may need FFP tomorrow  
Occupational Therapy  Facility/Department: [address] PROGRESSIVE CARE  Occupational Therapy Initial Assessment    Name: Jovan Awad  : 1940  MRN: [id number]  Date of Service: 3/26/2024    Discharge Recommendations:  Patient would benefit from continued therapy after discharge, 3-5 sessions per week, Continue to assess pending progress  OT Equipment Recommendations  Equipment Needed: No  Other: defer to d/c facility  Jovan Awad scored a 17/24 on the AM-PAC ADL Inpatient form. Current research shows that an AM-PAC score of 17 or less is typically not associated with a discharge to the patient's home setting. Based on the patient's AM-PAC score and their current ADL deficits, it is recommended that the patient have 3-5 sessions per week of Occupational Therapy at d/c to increase the patient's independence.  Please see assessment section for further patient specific details.    If patient discharges prior to next session this note will serve as a discharge summary.  Please see below for the latest assessment towards goals.       Patient Diagnosis(es): The primary encounter diagnosis was Hyperkalemia. Diagnoses of ARIANA (acute kidney injury) (HCC), Anemia, unspecified type, Elevated troponin, Elevated brain natriuretic peptide (BNP) level, Hypovolemia, Full code status, Acute urinary retention, Goals of care, counseling/discussion, and Supratherapeutic INR were also pertinent to this visit.  Past Medical History:  has a past medical history of Atrial fibrillation (HCC), CHF (congestive heart failure) (HCC), Hypertension, Seizures (HCC), Skin tear of right elbow without complication, and Thyroid disease.  Past Surgical History:  has a past surgical history that includes Cataract removal with implant (Left); knee surgery (Right); hernia repair; Colonoscopy; and Intracapsular cataract extraction (Right, 2021).    Treatment Diagnosis: decreased fxl transfers/mob and self care      Assessment   Performance deficits 
Occupational Therapy  Facility/Department: [facility] 5 PROGRESSIVE CARE  Occupational Therapy Daily Treatment    Name: Jovan Awad  : 1940  MRN: [id number]  Date of Service: 3/28/2024    Discharge Recommendations:  Patient would benefit from continued therapy after discharge, 3-5 sessions per week, Continue to assess pending progress  OT Equipment Recommendations  Other: defer to d/c facility  Jovan Awad scored a 17/24 on the AM-PAC ADL Inpatient form. Current research shows that an AM-PAC score of 17 or less is typically not associated with a discharge to the patient's home setting. Based on the patient's AM-PAC score and their current ADL deficits, it is recommended that the patient have 3-5 sessions per week of Occupational Therapy at d/c to increase the patient's independence.  Please see assessment section for further patient specific details.    If patient discharges prior to next session this note will serve as a discharge summary.  Please see below for the latest assessment towards goals.       Patient Diagnosis(es): The primary encounter diagnosis was Hyperkalemia. Diagnoses of ARIANA (acute kidney injury) (HCC), Anemia, unspecified type, Elevated troponin, Elevated brain natriuretic peptide (BNP) level, Hypovolemia, Full code status, Acute urinary retention, Goals of care, counseling/discussion, and Supratherapeutic INR were also pertinent to this visit.  Past Medical History:  has a past medical history of Atrial fibrillation (HCC), CHF (congestive heart failure) (HCC), Hypertension, Seizures (HCC), Skin tear of right elbow without complication, and Thyroid disease.  Past Surgical History:  has a past surgical history that includes Cataract removal with implant (Left); knee surgery (Right); hernia repair; Colonoscopy; Intracapsular cataract extraction (Right, 2021); IR NONTUNNELED VASCULAR CATHETER > 5 YEARS (Right, 2024); and IR NONTUNNELED VASCULAR CATHETER > 5 YEARS 
Occupational Therapy Attempt Note    Jovan Chopradeborah  1940  [id number]     OT attempting to see pt for tx session this date, however pt currently off floor for dialysis. Will attempt back as therapy schedule allows.     Electronically signed by ROBBIE Robbins/L on 4/1/2024 at 11:23 AM   
Occupational Therapy Attempt Note  Jovan Chopradeborah  1940  [id number]    OT attempting to see pt for tx session this date, however pt currently off floor for dialysis. Will attempt back as therapy schedule allows.     Electronically signed by ROBBIE Robbins/L on 3/29/2024 at 2:35 PM   
Patient frequently flipping between normal sinus rhythm and accelerated junctional rhythm per strips from [facility]. Message to MD. No new orders currently, awaiting response.  
Patient noticed to be having jerking movements/tremors occasionally. When asked, patient stated \"this just happens sometimes\"    Electronically signed by Kelly Cassidy RN on 3/24/2024 at 4:38 PM    
Patient recheck 72. Patient given apple juice    Electronically signed by Kelly Cassidy RN on 3/25/2024 at 1:21 PM    
Patient returned from IR after Vas Cath placement.  Patient resting in bed, alert and oriented, vital signs stable with call light in reach.  No needs at this time.   
Patient slept off and on throughout the night. Up to BSC several times. Small amounts of soft brownish/yellow stool noted. Denies stomach cramps or pain. No n/v noted at this time. Continue on ATB. Standing weight obtained this morning. Reminded to use call light for assistance. Safety precautions in place. Will continue to monitor.  
Per orders associated with isnulin/dextrose orders, BG checked q30 min x2 after admin, second check being 46. Prn glucose tabs given. BG check in 15 min.     Electronically signed by Kelly Cassidy RN on 3/25/2024 at 1:01 PM    
Physical Therapy      Jovan Awad  [id number]  [id number]    PT orders received and chart reviewed; attempted to see for PT eval however pt's BG is low and nursing requested to defer at this time.    Electronically signed by KVNG LAY, PT on 3/25/2024 at 1:15 PM       
Physical Therapy  Facility/Department: [address] PROGRESSIVE CARE  Physical Therapy Initial Assessment    Name: Jovan Awad  : 1940  MRN: [id number]  Date of Service: 3/26/2024    Discharge Recommendations:  Continue to assess pending progress, Patient would benefit from continued therapy after discharge (3-5x/wk vs. home w/ HHPT + RW)   PT Equipment Recommendations  Other: would need a RW if he did return home    Jovan Awad scored a 17/24 on the AM-PAC short mobility form. Current research shows that an AM-PAC score of 17 or less is typically not associated with a discharge to the patient's home setting. Based on the patient's AM-PAC score and their current functional mobility deficits, it is recommended that the patient have 3-5 sessions per week of Physical Therapy at d/c to increase the patient's independence.  Please see assessment section for further patient specific details.    If patient discharges prior to next session this note will serve as a discharge summary.  Please see below for the latest assessment towards goals.       Patient Diagnosis(es): The primary encounter diagnosis was Hyperkalemia. Diagnoses of ARIANA (acute kidney injury) (HCC), Anemia, unspecified type, Elevated troponin, Elevated brain natriuretic peptide (BNP) level, Hypovolemia, Full code status, Acute urinary retention, Goals of care, counseling/discussion, and Supratherapeutic INR were also pertinent to this visit.  Past Medical History:  has a past medical history of Atrial fibrillation (HCC), CHF (congestive heart failure) (HCC), Hypertension, Seizures (HCC), Skin tear of right elbow without complication, and Thyroid disease.  Past Surgical History:  has a past surgical history that includes Cataract removal with implant (Left); knee surgery (Right); hernia repair; Colonoscopy; and Intracapsular cataract extraction (Right, 2021).    Assessment   Body Structures, Functions, Activity Limitations Requiring Skilled Therapeutic 
Physical Therapy  Facility/Department: [facility] 5 PROGRESSIVE CARE  Physical Therapy treatment session    Name: Jovan Awad  : 1940  MRN: [id number]  Date of Service: 3/28/2024    Discharge Recommendations:  Patient would benefit from continued therapy after discharge (3-5x/wk)   PT Equipment Recommendations  Other: would need a RW if he did return home    Jovan Awad scored a 17/24 on the AM-PAC short mobility form. Current research shows that an AM-PAC score of 17 or less is typically not associated with a discharge to the patient's home setting. Based on the patient's AM-PAC score and their current functional mobility deficits, it is recommended that the patient have 3-5 sessions per week of Physical Therapy at d/c to increase the patient's independence.  Please see assessment section for further patient specific details.    If patient discharges prior to next session this note will serve as a discharge summary.  Please see below for the latest assessment towards goals.        Patient Diagnosis(es): The primary encounter diagnosis was Hyperkalemia. Diagnoses of ARIANA (acute kidney injury) (HCC), Anemia, unspecified type, Elevated troponin, Elevated brain natriuretic peptide (BNP) level, Hypovolemia, Full code status, Acute urinary retention, Goals of care, counseling/discussion, and Supratherapeutic INR were also pertinent to this visit.  Past Medical History:  has a past medical history of Atrial fibrillation (HCC), CHF (congestive heart failure) (HCC), Hypertension, Seizures (HCC), Skin tear of right elbow without complication, and Thyroid disease.  Past Surgical History:  has a past surgical history that includes Cataract removal with implant (Left); knee surgery (Right); hernia repair; Colonoscopy; Intracapsular cataract extraction (Right, 2021); IR NONTUNNELED VASCULAR CATHETER > 5 YEARS (Right, 2024); and IR NONTUNNELED VASCULAR CATHETER > 5 YEARS (3/26/2024).    Assessment   Body Structures, 
Physical Therapy  Facility/Department: [facility] 5W PROGRESSIVE CARE  Physical Therapy Daily Note    Name: Jovan Awad  : 1940  MRN: [id number]  Date of Service: 3/27/2024    Discharge Recommendations:  Continue to assess pending progress, Patient would benefit from continued therapy after discharge (3-5x/wk vs. home w/ HHPT + RW)   PT Equipment Recommendations  Other: would need a RW if he did return home      Patient Diagnosis(es): The primary encounter diagnosis was Hyperkalemia. Diagnoses of ARIANA (acute kidney injury) (HCC), Anemia, unspecified type, Elevated troponin, Elevated brain natriuretic peptide (BNP) level, Hypovolemia, Full code status, Acute urinary retention, Goals of care, counseling/discussion, and Supratherapeutic INR were also pertinent to this visit.  Past Medical History:  has a past medical history of Atrial fibrillation (HCC), CHF (congestive heart failure) (HCC), Hypertension, Seizures (HCC), Skin tear of right elbow without complication, and Thyroid disease.  Past Surgical History:  has a past surgical history that includes Cataract removal with implant (Left); knee surgery (Right); hernia repair; Colonoscopy; Intracapsular cataract extraction (Right, 2021); IR NONTUNNELED VASCULAR CATHETER > 5 YEARS (Right, 2024); and IR NONTUNNELED VASCULAR CATHETER > 5 YEARS (3/26/2024).    Assessment   Body Structures, Functions, Activity Limitations Requiring Skilled Therapeutic Intervention: Decreased functional mobility ;Decreased strength;Decreased safe awareness;Decreased cognition;Decreased endurance;Decreased balance  Assessment: Pt is a 84 yo male with generalized weakness and fatigue s/p diarrhea and ARIANA. PMH significant for chronic AFib; hypertension; chronic diastolic heart failure. PTA pt lives alone in a one story home w/ level entry, Indep  for ADLs and ambulation w/ recent use of cane d/t increased weakness and PRN assist from sister (across street) and HHA x1/month. 
Pt to Rm [id number] via ED stretcher with all personal belongings. Oriented to room and role as RN. PCU bedside monitor applied and verified by [facility]. Pt transfered to bed, alert and oriented x4. Assessment of pt in progress. POC and education initiated per protocol. Call light within reach. Bed in lowest position and wheels locked. Room is free of clutter. Personal belongings within reach. No current complaints at this time.     Electronically signed by Kelly Cassidy RN on 3/24/2024 at 4:37 PM   
Report called to Uday given to nurse Grace patient remains in HD at this time is aware of discharge when he is done transport scheduled for 6pm.   
Treatment time: 2 hours  Net UF: 1000 ml     Pre weight: 108.2 kg  Post weight:107.2 kg    Access used: rij    Access function: well with  ml/min     Medications or blood products given: heparin dwells     Regular outpatient schedule: TBD     Summary of response to treatment: Patient tolerated treatment well and without any complications. Patient remained stable throughout entire treatment and upon the exiting the dialysis suite via transport.     Report given to ENA hinton and copy of dialysis treatment record fplaced in pt's chart to be scanned into EMR.  
Treatment time: 3 hours    Net UF: 1000ml    Pre weight: 99.6kg  Post weight: 98.6kg  EDW: TBD    Access used: R chest wall CVC  Access function: Well    Medications or blood products given: NA    Regular outpatient schedule: NA    Summary of response to treatment: Patient tolerated treatment well. He was able to remove  1000ml without difficulty. Patient returned to her room per hospital bed.     Copy of dialysis treatment record placed in chart, to be scanned into EMR.  
Treatment time: 3 hours  Net UF: 1000 ml     Pre weight: 107.2 kg  Post weight:105.9 kg    Access used: rtdc    Access function: well with  ml/min     Medications or blood products given: heparin dwells     Regular outpatient schedule: tbd     Summary of response to treatment: Patient tolerated treatment well and without any complications. Patient remained stable throughout entire treatment and upon the exiting the dialysis suite via transport.     Report given to ENA hinton and copy of dialysis treatment record faxed to [facility], to be scanned into EMR.  
Treatment time: 3 hours  Net UF: 2000 ml     Pre weight: 99.5 kg  Post weight:97.5 kg  EDW: 104 kg    Access used: CVC    Access function: good with  ml/min     Medications or blood products given: 21675b Retacrit     Regular outpatient schedule: MWF     Summary of response to treatment: Patient tolerated treatment well and without any complications. Patient remained stable throughout entire treatment. Copy of dialysis treatment record placed in chart, to be scanned into EMR.  
Treatment time: 3.5 hours ordered- ran 3 hours 10 min. System clotted  Net UF: 1737 ml    Pre weight: 101.5 kg   Post weight: 100 kg  EDW: 104 kg    Access used: RIJ temp line  Access function: good with -400 ml/min    Medications or blood products given: heparin dwells, retacrit 10,000 units    Regular outpatient schedule: ARIANA MWF    Summary of response to treatment: Tolerated good. VSS. System clotted. Patient is going down to have temp RIJ replaced with a tunneled line today at 3pm. Dressing to RIJ temp line changed. Site unremarkable for s/s infection    Copy of dialysis treatment record placed in chart, to be scanned into EMR.  
Up to BSC 3 times last evening. Continue on antibiotics. Denies abd or cramping. Paulino catheter patent. Reminded to use call light for assistance. Will continue to monitor.  
   WBC 5.9 04/02/2024 05:22 AM    RBC 2.46 04/02/2024 05:22 AM    HGB 8.1 04/02/2024 05:22 AM    HCT 23.0 04/02/2024 05:22 AM    MCV 93.2 04/02/2024 05:22 AM    MCH 33.0 04/02/2024 05:22 AM    MCHC 35.4 04/02/2024 05:22 AM    RDW 14.2 04/02/2024 05:22 AM     04/02/2024 05:22 AM    MPV 7.2 04/02/2024 05:22 AM     BMP:    Lab Results   Component Value Date/Time     04/02/2024 05:22 AM    K 4.4 04/02/2024 05:22 AM    K 4.0 04/01/2024 02:52 PM     04/02/2024 05:22 AM    CO2 25 04/02/2024 05:22 AM    BUN 30 04/02/2024 05:22 AM    CREATININE 5.0 04/02/2024 05:22 AM    CALCIUM 8.3 04/02/2024 05:22 AM    LABGLOM 11 04/02/2024 05:22 AM    GLUCOSE 109 04/02/2024 05:22 AM     Ionized Calcium:  No results found for: \"IONCA\"  Magnesium:    Lab Results   Component Value Date/Time    MG 1.70 04/02/2024 05:22 AM     Phosphorus:    Lab Results   Component Value Date/Time    PHOS 4.3 04/02/2024 05:22 AM     U/A:    Lab Results   Component Value Date/Time    COLORU Yellow 03/24/2024 12:33 PM    PHUR 5.0 03/24/2024 12:33 PM    WBCUA 2 03/24/2024 12:33 PM    RBCUA 44 03/24/2024 12:33 PM    MUCUS Rare 11/01/2022 11:16 AM    BACTERIA None Seen 03/24/2024 12:33 PM    CLARITYU CLOUDY 03/24/2024 12:33 PM    SPECGRAV 1.013 03/24/2024 12:33 PM    LEUKOCYTESUR TRACE 03/24/2024 12:33 PM    UROBILINOGEN 0.2 03/24/2024 12:33 PM    BILIRUBINUR Negative 03/24/2024 12:33 PM    BLOODU LARGE 03/24/2024 12:33 PM    GLUCOSEU Negative 03/24/2024 12:33 PM         IMPRESSION/RECOMMENDATIONS:      ARIANA: baseline Cr ~ 0.9-1.1 mg/dL. ARIANA 2/2 to volume depletion, lisinopril, and obstructive uropathy. HD initiated 3/26/2024.   - Access: R TDC (placed by IR 4/2/2024). R temp vas cath removed 4/2.              - Off lisinopril              - Continue HD MWF with 104 kg DW   - Cr continues to trend up between HD tx's. 710 mL UOP/24 hrs.              - Care coordination note reviewed: plan to go [facility]/p [facility]. Cannot dialyze in-house 
non-distended, non-tender. Bowel sounds normal.   Extremities: atraumatic, no cyanosis or edema  Skin: warm and dry, no jaundice  Neuro: Grossly intact, A&OX3    LABS AND IMAGING   Laboratory   Recent Labs     03/26/24  0558 03/27/24  0525 03/28/24  0458   WBC 4.3 5.4 5.6   HGB 7.4* 8.6* 7.8*   HCT 22.0* 25.2* 22.6*   MCV 94.5 94.2 93.4   PLT 84* 102* 105*     Recent Labs     03/27/24  0524 03/27/24  1302 03/28/24  0458    139 138   K 4.9  4.9 4.6 4.4  4.4    106 105   CO2 21 22 23   PHOS 4.3  --  3.1   BUN 59* 61* 32*   CREATININE 6.7* 7.3* 5.1*     No results for input(s): \"AST\", \"ALT\", \"ALB\", \"BILIDIR\", \"BILITOT\", \"ALKPHOS\" in the last 72 hours.    No results for input(s): \"LIPASE\", \"AMYLASE\" in the last 72 hours.  Recent Labs     03/26/24  0558 03/27/24  0524 03/28/24  0458   PROTIME 25.5* 18.6* 18.5*   INR 2.34* 1.56* 1.54*         Imaging  IR NONTUNNELED VASCULAR CATHETER > 5 YEARS   Final Result   Successful ultrasound guided non-tunneled dialysis catheter placement.         CT CHEST ABDOMEN PELVIS WO CONTRAST Additional Contrast? None   Final Result   1. No significant soft tissue hematoma.   2. Mild left basilar airspace disease may represent atelectasis or pneumonia.   3. Trace bilateral pleural effusions, greater on the right.   4. Moderate cardiomegaly with small to moderate pericardial effusion.   5. Moderate centrilobular emphysematous changes.   6. Cholelithiasis with possible gallbladder wall thickening. Consider right   upper quadrant ultrasound for further evaluation if there is concern for   acute cholecystitis.  However, this may be related to anasarca.   7. Edema noted about the urinary bladder. Correlate with urinalysis to   exclude cystitis.   8. Colonic diverticulosis.  There are traces of mesenteric edema,   particularly adjacent to the descending colon.  There is low suspicion for   acute diverticulitis given the lack of bowel wall thickening.   9. Anasarca including mild body 
  General  Chart Reviewed: Yes  Patient assessed for rehabilitation services?: Yes  Additional Pertinent Hx: 83-year-old male with PMH significant for chronic AFib; hypertension; chronic diastolic heart failure, came to ER complaining of feeling weak, decreased level of energy and diarrhea off and on for the last 3 to 4 weeks.  Response To Previous Treatment: Patient with no complaints from previous session.  Family / Caregiver Present: No  Referring Practitioner: Carissa Yeh MD  Referral Date : 03/26/24  Diagnosis: ARIANA  Follows Commands: Within Functional Limits  Subjective  Subjective: Pt in recliner chair upon arrival.  Agreeable to PT.  Requesting to use restroom.  Reports pain in chaya feet.         Social/Functional History  Social/Functional History  Lives With: Alone  Type of Home: House  Home Layout: One level, Able to Live on Main level with bedroom/bathroom  Home Access: Level entry  Bathroom Shower/Tub: Tub/Shower unit, Walk-in shower (uses walk-in shower)  Bathroom Toilet: Standard  Bathroom Equipment: None  Home Equipment: Cane, Alert Button  Has the patient had two or more falls in the past year or any fall with injury in the past year?: No  ADL Assistance: Independent  Homemaking Assistance: Needs assistance (HHA cleans the house once per month)  Ambulation Assistance: Independent (recent use of cane d/t weakness, but has not needed AD)  Transfer Assistance: Independent  Active : Yes  Additional Comments: 1 sister lives across the street, 2 sisters live in [place], and 1 sister lives in [place], [place]       Cognition   Orientation  Overall Orientation Status: Within Functional Limits  Orientation Level: Oriented X4  Cognition  Overall Cognitive Status: Exceptions  Memory: Appears intact  Safety Judgement: Decreased awareness of need for assistance;Decreased awareness of need for safety  Insights: Decreased awareness of deficits  Cognition Comment: slight decreased insight; impulsive at times with 
4.1 4.4 4.4  4.4 4.6 4.9  4.9   < >  --    < > 5.7*  5.8*   < >  --  5.7*    107 105 106 106   < >  --    < > 111*  113*   < >  --  112*   CO2 23 22 23 22 21   < >  --    < > 20*  21   < >  --  17*   GLUCOSE 104* 144* 105* 129* 104*   < >  --    < > 107*  108*   < >  --  115*   BUN 43* 35* 32* 61* 59*   < >  --    < > 83*  87*   < >  --  84*   CREATININE 6.8* 5.6* 5.1* 7.3* 6.7*   < >  --    < > 8.0*  9.1*   < >  --  8.0*   LABGLOM 7* 9* 10* 7* 7*   < >  --    < > 6*  5*   < >  --  6*   CALCIUM 8.0* 8.2* 7.9* 8.1* 8.3   < >  --    < > 7.8*  7.9*   < >  --  8.6   PROT  --   --   --   --   --   --  5.4*  --  5.6*  --   --  6.2*   LABALBU 2.9*  --  2.8*  --  3.2*  --  1.9*   < > 2.9*  --   --  3.2*   AGRATIO  --   --   --   --   --   --   --   --  1.1  --   --  1.1   BILITOT  --   --   --   --   --   --   --   --  0.3  --   --  0.4   ALKPHOS  --   --   --   --   --   --   --   --  69  --   --  69   ALT  --   --   --   --   --   --   --   --  12  --   --  10   AST  --   --   --   --   --   --   --   --  15  --   --  10*   MG  --   --   --   --   --   --   --   --  1.90  --  1.90  --     < > = values in this interval not displayed.       Lab Results   Component Value Date    CALCIUM 8.0 (L) 03/29/2024    PHOS 3.4 03/29/2024       LDH:  Recent Labs     03/25/24  0017   *       Radiology Review:  IR NONTUNNELED VASCULAR CATHETER > 5 YEARS  Narrative: PROCEDURE:  ULTRASOUND GUIDED VASCULAR ACCESS.  PLACEMENT OF A NON-TUNNELED CATHETER.    MODERATE CONSCIOUS SEDATION    3/26/2024.    HISTORY:  ORDERING SYSTEM PROVIDED HISTORY: temporary dialysis catheter  TECHNOLOGIST PROVIDED HISTORY:  Reason for exam:->temporary dialysis catheter    SEDATION:  None.  Lidocaine for local anesthesia only.    TECHNIQUE:  Informed consent was obtained after a detailed explanation of the procedure  including risks, benefits, and alternatives. Universal protocol was observed.  The right neck was prepped and draped in 
bowel wall thickening.   9. Anasarca including mild body wall edema, trace bilateral pleural effusions   and pericardial effusion.         XR CHEST (2 VW)   Final Result   Mild left basilar airspace change suspected to represent atelectasis.  Given   acute symptoms, asymmetric edema or developing pneumonitis cannot be excluded.             Endoscopy      ASSESSMENT AND RECOMMENDATIONS   Jovan Awad is a 83 y.o. male with PMH of A-fib, CHF, HTN, seizures, thyroid disease who presented on 3/24/2024 with shortness of breath and diarrhea.  He was admitted with ARIANA suspected due to severe dehydration.  GI consulted regarding diarrhea.     CDI associated diarrhea.  Likely due to recent antibiotic use.  First episode.  White count normal.  No evidence of fulminant colitis on CT.  Pt started on Dificid 200 mg twice daily for 10-day course  ARIANA, prerenal.  Nephrology following Patient started on dialysis 3/26/202   Chronic diastolic heart failure  Paroxysmal atrial fibrillation   Hematuria, urology following  possibly due to possible cystitis vs avendaño trauma in setting of supratherapeutic INR.  UA neg for infection  Normocytic anemia.  Iron studies with mixed picture.   Hematology following, suspect mulitfactorial from intermittent hematuria and ARIANA  Received Retacrit.  Patient with no overt GI bleeding.  thrombocytopenia    RECOMMENDATIONS:    Continue dificid 200 mg twice daily x 10 days  Monitor stool output  Diet as tolerated      If you have any questions or need any further information, please feel free to contact us [phone].  Thank you for allowing us to participate in the care of Jovan Awad.    The note was completed using Dragon voice recognition transcription. Every effort was made to ensure accuracy; however, inadvertent transcription errors may be present despite my best efforts to edit errors.    Raymundo JACOME  
  ADL  Grooming: Contact guard assistance  Grooming Skilled Clinical Factors: CGA to wash hands in stance at sink  Toileting Skilled Clinical Factors: pt attempting BM seated on commode but unsuccessful; SUP to complete pericare seated on toilet and did not complete pant management this date; avendaño catheter in place to void  Functional Mobility: Contact guard assistance  Functional Mobility Skilled Clinical Factors: CGA to/from bathroom with RW; no unsteadiness or LOB noted; pt reporting fatigue afterwards declining further mobility this date  Skin Care: Bath wipes;Chlorhexidine wipes     Activity Tolerance  Activity Tolerance: Patient tolerated treatment well  Activity Tolerance Comments: Significant time needed for BM and pericare  Bed mobility  Bed Mobility Comments: unable to assess this date; pt seated in recliner at beginning and end of session  Transfers  Sit to stand: Contact guard assistance  Stand to sit: Contact guard assistance  Transfer Comments: to/from recliner with use of RW w/ cues for safe hand placement  Vision  Vision: Within Functional Limits  Hearing  Hearing: Exceptions to WFL  Hearing Exceptions: Hard of hearing/hearing concerns;Bilateral hearing aid  Cognition  Overall Cognitive Status: Exceptions  Memory: Appears intact  Safety Judgement: Decreased awareness of need for assistance;Decreased awareness of need for safety  Insights: Decreased awareness of deficits  Cognition Comment: slight decreased insight; impulsive at times with OOB mobility  Orientation  Overall Orientation Status: Within Functional Limits  Orientation Level: Oriented X4               Static Sitting Balance Exercises: SUP to attempt BM seated on commode x5-7 minutes but unsuccessful  Dynamic Sitting Balance Exercises: SUP to complete pericare while seated on commode  Static Standing Balance Exercises: CGA with RW in prep for fxl mobility  Dynamic Standing Balance Exercises: CGA at sink to complete grooming task x2 
INR, hematoma to right  lateral side  Relevant Medical/Surgical History: hernia repair    FINDINGS:    Chest:    Mediastinum: Moderate cardiomegaly.  Small to moderate pericardial effusion.  Aortic valvular calcifications.  Pulmonary trunk measures approximately 3.3  cm in diameter.  No adenopathy.    Lungs/pleura: Mild left basilar airspace disease in the lingula and lower  lobe.  Minimal dependent atelectasis in the right lower lobe.  Trace  bilateral pleural effusions, greater on the right.  Moderate centrilobular  emphysematous changes.  No pneumothorax.  Bilateral apical scarring.    Soft Tissues/Bones: Mild body wall edema.  No definite soft tissue hematoma.  No acute fracture.  Multiple remote Confluent anterolateral vertebral body  syndesmophyte formation compatible with DISH.   healed left-sided rib  fractures.    Abdomen/Pelvis:    Organs: Liver, spleen, pancreas, kidneys and adrenals demonstrate no acute  abnormality.  Cholelithiasis.  Possible gallbladder wall thickening.  Calcified splenic and hepatic granulomas compatible with remote granulomatous  disease.  Small areas of focal renal cortical scarring.    GI/Bowel: No abnormal dilatation.  No appreciable wall thickening.  Colonic  diverticulosis.    Pelvis: Edema noted about the urinary bladder.  Paulino catheter decompresses  the bladder.    Peritoneum/Retroperitoneum: Traces of edema in the pelvis and mesentery,  particularly in the left pericolic gutter.  No retroperitoneal adenopathy.  Abdominal aorta is normal in caliber.    Bones/Soft Tissues: Mild body wall edema.  No significant hematoma  appreciated.  Multilevel degenerative changes of the lumbar spine.  Impression: 1. No significant soft tissue hematoma.  2. Mild left basilar airspace disease may represent atelectasis or pneumonia.  3. Trace bilateral pleural effusions, greater on the right.  4. Moderate cardiomegaly with small to moderate pericardial effusion.  5. Moderate centrilobular 
200 mg IntraVENous Daily    epoetin funmilayo-epbx  10,000 Units IntraVENous Once per day on Mon Wed Fri    Virt-Caps  1 capsule Oral Daily    Fidaxomicin  200 mg Oral BID    atorvastatin  20 mg Oral Daily    levothyroxine  50 mcg Oral Daily    sodium chloride flush  5-40 mL IntraVENous 2 times per day    levETIRAcetam  500 mg Oral Daily    finasteride  5 mg Oral Daily      Infusions:    dextrose      sodium chloride       PRN Meds: heparin (porcine), 2,400 Units, PRN  hydrALAZINE, 5 mg, Q6H PRN  glucose, 4 tablet, PRN  dextrose bolus, 125 mL, PRN   Or  dextrose bolus, 250 mL, PRN  glucagon (rDNA), 1 mg, PRN  dextrose, , Continuous PRN  sodium chloride flush, 5-40 mL, PRN  sodium chloride, , PRN  polyethylene glycol, 17 g, Daily PRN  acetaminophen, 650 mg, Q6H PRN   Or  acetaminophen, 650 mg, Q6H PRN        Labs and Imaging   IR NONTUNNELED VASCULAR CATHETER > 5 YEARS    Result Date: 3/26/2024  PROCEDURE: ULTRASOUND GUIDED VASCULAR ACCESS. PLACEMENT OF A NON-TUNNELED CATHETER. MODERATE CONSCIOUS SEDATION 3/26/2024. HISTORY: ORDERING SYSTEM PROVIDED HISTORY: temporary dialysis catheter TECHNOLOGIST PROVIDED HISTORY: Reason for exam:->temporary dialysis catheter SEDATION: None.  Lidocaine for local anesthesia only. TECHNIQUE: Informed consent was obtained after a detailed explanation of the procedure including risks, benefits, and alternatives. Universal protocol was observed. The right neck was prepped and draped in sterile fashion using maximum sterile barrier technique. Local anesthesia was achieved with lidocaine. A micropuncture needle was used to access the right internal jugular vein using ultrasound guidance.  An ultrasound image demonstrating patency of the vein with needle tip located within it. An image was obtained and stored in PACs. A 0.035 guidewire was used to place a 15 cm temporary dialysis catheter after fascial tract dilation. The catheter flushed easily and there was a good blood return. The catheter 
Program  Education Provided Comments: importance of mobility, use of ice for knee pain  Education Method: Verbal;Demonstration  Barriers to Learning: Hearing  Education Outcome: Verbalized understanding;Demonstrated understanding;Continued education needed      Therapy Time   Individual Concurrent Group Co-treatment   Time In 0955         Time Out 1038         Minutes 43         Timed Code Treatment Minutes: 43 Minutes       Electronically signed by Denise Johnson, PT [id number] on 4/2/2024 at 12:31 PM           
finding.     Mild left basilar airspace change suspected to represent atelectasis.  Given acute symptoms, asymmetric edema or developing pneumonitis cannot be excluded.       CBC:   Recent Labs     03/25/24  0017 03/25/24  0512 03/25/24  1058 03/26/24  0558   WBC 5.2 4.6  --  4.3   HGB 7.2* 7.1* 7.5* 7.4*   PLT 92* 92*  --  84*       BMP:    Recent Labs     03/25/24  0512 03/25/24  1208 03/26/24  0024    141 139   K 5.7* 5.3* 5.9*   * 110 108   CO2 20* 18* 19*   BUN 88* 80* 80*   CREATININE 8.8* 8.2* 8.3*   GLUCOSE 106* 110* 114*       Hepatic:   Recent Labs     03/24/24  0951 03/25/24  0017   AST 10* 15   ALT 10 12   BILITOT 0.4 0.3   ALKPHOS 69 69       Lipids: No results found for: \"CHOL\", \"HDL\", \"TRIG\"  Hemoglobin A1C: No results found for: \"LABA1C\"  TSH: No results found for: \"TSH\"  Troponin: No results found for: \"TROPONINT\"  Lactic Acid: No results for input(s): \"LACTA\" in the last 72 hours.  BNP:   Recent Labs     03/24/24  0951   PROBNP 22,703*       UA:  Lab Results   Component Value Date/Time    NITRU Negative 03/24/2024 12:33 PM    COLORU Yellow 03/24/2024 12:33 PM    PHUR 5.0 03/24/2024 12:33 PM    WBCUA 2 03/24/2024 12:33 PM    RBCUA 44 03/24/2024 12:33 PM    MUCUS Rare 11/01/2022 11:16 AM    BACTERIA None Seen 03/24/2024 12:33 PM    CLARITYU CLOUDY 03/24/2024 12:33 PM    SPECGRAV 1.013 03/24/2024 12:33 PM    LEUKOCYTESUR TRACE 03/24/2024 12:33 PM    UROBILINOGEN 0.2 03/24/2024 12:33 PM    BILIRUBINUR Negative 03/24/2024 12:33 PM    BLOODU LARGE 03/24/2024 12:33 PM    GLUCOSEU Negative 03/24/2024 12:33 PM    KETUA Negative 03/24/2024 12:33 PM     Urine Cultures:   Lab Results   Component Value Date/Time    LABURIN >100,000 CFU/ml 11/01/2022 11:16 AM     Blood Cultures: No results found for: \"BC\"  No results found for: \"BLOODCULT2\"  Organism:   Lab Results   Component Value Date/Time    ORG C. difficile toxin B gene detected 03/24/2024 05:30 PM         Electronically signed by Carissa Yeh 
or developing pneumonitis cannot be excluded.       CBC:   Recent Labs     03/24/24  0951 03/25/24  0017 03/25/24  0512 03/25/24  1058   WBC 4.1 5.2 4.6  --    HGB 8.2* 7.2* 7.1* 7.5*   * 92* 92*  --      BMP:    Recent Labs     03/25/24  0017 03/25/24  0512 03/25/24  1208     143 142 141   K 5.7*  5.8* 5.7* 5.3*   *  113* 112* 110   CO2 20*  21 20* 18*   BUN 83*  87* 88* 80*   CREATININE 8.0*  9.1* 8.8* 8.2*   GLUCOSE 107*  108* 106* 110*     Hepatic:   Recent Labs     03/24/24  0951 03/25/24  0017   AST 10* 15   ALT 10 12   BILITOT 0.4 0.3   ALKPHOS 69 69     Lipids: No results found for: \"CHOL\", \"HDL\", \"TRIG\"  Hemoglobin A1C: No results found for: \"LABA1C\"  TSH: No results found for: \"TSH\"  Troponin: No results found for: \"TROPONINT\"  Lactic Acid: No results for input(s): \"LACTA\" in the last 72 hours.  BNP:   Recent Labs     03/24/24  0951   PROBNP 22,703*     UA:  Lab Results   Component Value Date/Time    NITRU Negative 03/24/2024 12:33 PM    COLORU Yellow 03/24/2024 12:33 PM    PHUR 5.0 03/24/2024 12:33 PM    WBCUA 2 03/24/2024 12:33 PM    RBCUA 44 03/24/2024 12:33 PM    MUCUS Rare 11/01/2022 11:16 AM    BACTERIA None Seen 03/24/2024 12:33 PM    CLARITYU CLOUDY 03/24/2024 12:33 PM    SPECGRAV 1.013 03/24/2024 12:33 PM    LEUKOCYTESUR TRACE 03/24/2024 12:33 PM    UROBILINOGEN 0.2 03/24/2024 12:33 PM    BILIRUBINUR Negative 03/24/2024 12:33 PM    BLOODU LARGE 03/24/2024 12:33 PM    GLUCOSEU Negative 03/24/2024 12:33 PM    KETUA Negative 03/24/2024 12:33 PM     Urine Cultures:   Lab Results   Component Value Date/Time    LABURIN >100,000 CFU/ml 11/01/2022 11:16 AM     Blood Cultures: No results found for: \"BC\"  No results found for: \"BLOODCULT2\"  Organism:   Lab Results   Component Value Date/Time    ORG C. difficile toxin B gene detected 03/24/2024 05:30 PM         Electronically signed by Carissa Yeh MD on 3/25/2024 at 5:33 PM  
Term Goal 4: pt will complete fxl ADL transfers w/ Mod I  Long Term Goals  Time Frame for Long Term Goals : LTG=STG  Patient Goals   Patient goals : to return home     Therapy Time   Individual Concurrent Group Co-treatment   Time In 0817         Time Out 0920         Minutes 63         Timed Code Treatment Minutes: 63 Minutes     Electronically signed by HARSHAL Robbins on 4/3/2024 at 9:30 AM

## 2024-04-03 NOTE — CARE COORDINATION
Case Management Discharge Note          Date / Time of Note: 4/3/2024 12:33 PM                  Patient Name: Jovan Awad   YOB: 1940  Diagnosis: Hypovolemia [E86.1]  Hyperkalemia [E87.5]  Elevated troponin [R79.89]  ARIANA (acute kidney injury) (HCC) [N17.9]  Goals of care, counseling/discussion [Z71.89]  Acute urinary retention [R33.8]  Elevated brain natriuretic peptide (BNP) level [R79.89]  Supratherapeutic INR [R79.1]  Full code status [Z78.9]  Anemia, unspecified type [D64.9]   Date / Time: 3/24/2024  9:34 AM    Financial:  Payor: MEDICARE / Plan: MEDICARE PART A AND B / Product Type: *No Product type* /      Pharmacy:    [facility] [id number] [facility] [address] [address] [facility] [phone] -  [phone]  [address] [place] [address]  Phone: [phone] Fax: [phone]      Assistance purchasing medications?: Potential Assistance Purchasing Medications: No  Assistance provided by Case Management: None at this time    DISCHARGE Disposition: Skilled Nursing Facility (SNF)    Nursing Facility:   Discharging to Facility/ Agency   Name: [facility]  Address:  [address] [place] [address]   Phone:  [phone]  Fax:  [phone]     Discharging to Dialysis  Name: [facility]  Address:  [address] [place] [address]   Phone:  [phone]  Fax:  [phone]  Chair time: 11:45 am  Days: MWF    FIRST DIALYSIS TREATMENT-Friday April 5th at 11:30 am    LOC at discharge: Skilled Nursing  CRISTOBAL Completed: Yes               Notification completed in HENS/PAS?:  Yes : CM has completed HENS online through secure website for SNF admission at [facility].   Document ID #: [id number]    Transportation:  Transportation PLAN for discharge: EMS transportation   Mode of Transport: Ambulance stretcher - BLS  Name of Transport Company: [facility]  Phone: [phone]  Time of Transport: 6 pm    Transport form completed:

## 2024-04-10 ENCOUNTER — TELEPHONE (OUTPATIENT)
Dept: PHARMACY | Age: 84
End: 2024-04-10

## 2024-04-10 NOTE — TELEPHONE ENCOUNTER
I attempted to call Jovan to follow up with him about possibly needing an appointment for an INR check.  Per our last contact with him he was being admitted to a care facility, but we wanted to check in to see if he is home yet or not.  I requested he return our call at his convenience.     Leela Mendes, PharmD 04/10/24   9:33 AM

## 2024-04-16 ENCOUNTER — TELEPHONE (OUTPATIENT)
Dept: PHARMACY | Age: 84
End: 2024-04-16

## 2024-04-16 NOTE — TELEPHONE ENCOUNTER
Call from Jovan RE: status update.  He is currently in [facility].  His INR last Thursday = 2.2.  He is now on dialysis with [facility] three times a week.  Should be coming home soon. He will F/U when he is discharged to home.     Harinder Sargent [facility], PRS  [facility]  Anticoagulation Clinic  [address]  [place] [address]  [phone]     For Pharmacy Admin Tracking Only    Intervention Detail: Adherence Monitorin  Total # of Interventions Recommended: 1  Total # of Interventions Accepted: 1  Time Spent (min): 15

## 2024-04-22 ENCOUNTER — TELEPHONE (OUTPATIENT)
Dept: PHARMACY | Age: 84
End: 2024-04-22

## 2024-04-22 NOTE — TELEPHONE ENCOUNTER
Sharla with [facility] called [phone]. Jovan is getting PT/OT with them, however, patient prefers to come to our clinic to manage his warfarin.     Last INR = 2.9 on 4/18 with discharge from the SNF 4/18/2024. He is taking warfarin 3mg daily.     She just wanted to make us aware.     I advised I would call Jovan and schedule a f/u.    Called and left him a message to return our call.     Sharla Casey, PharmD  [facility]  Anticoagulation  [phone]    For Pharmacy Admin Tracking Only    Intervention Detail:   Total # of Interventions Recommended:   Total # of Interventions Accepted:   Time Spent (min): 10

## 2024-05-15 ENCOUNTER — TELEPHONE (OUTPATIENT)
Dept: PHARMACY | Age: 84
End: 2024-05-15

## 2024-05-15 NOTE — TELEPHONE ENCOUNTER
I spoke to Jovan's son Goyo today and he informed me that Jovan passed away a few weeks ago.  I will remove his name from our management list and flag his chart to be updated.    Leela Mendes, PharmD 05/15/24 1:46 PM

## (undated) DEVICE — SYRINGE MED 30ML STD CLR PLAS LUERLOCK TIP N CTRL DISP

## (undated) DEVICE — SURGICAL PROC PACK SHT WEST V4

## (undated) DEVICE — Device

## (undated) DEVICE — GLOVE,SURG,TRIUMPH MICRO,LTX,PF,7.5: Brand: MEDLINE

## (undated) DEVICE — SYRINGE MED 3ML CLR PLAS STD N CTRL LUERLOCK TIP DISP

## (undated) DEVICE — SOLUTION IRRIG BSS ST 500ML

## (undated) DEVICE — SYRINGE TB 1ML NDL 25GA L0.625IN PLAS SLIP TIP CONVENTIONAL

## (undated) DEVICE — Device: Brand: MEDEX

## (undated) DEVICE — SOLUTION IV IRRIG WATER 500ML POUR BRL ST 2F7113